# Patient Record
Sex: MALE | Race: WHITE | Employment: OTHER | URBAN - NONMETROPOLITAN AREA
[De-identification: names, ages, dates, MRNs, and addresses within clinical notes are randomized per-mention and may not be internally consistent; named-entity substitution may affect disease eponyms.]

---

## 2017-01-09 RX ORDER — ROSUVASTATIN CALCIUM 20 MG/1
TABLET, COATED ORAL
Qty: 15 TABLET | Refills: 11 | Status: SHIPPED | OUTPATIENT
Start: 2017-01-09 | End: 2017-02-23 | Stop reason: SDUPTHER

## 2017-01-19 ENCOUNTER — OFFICE VISIT (OUTPATIENT)
Dept: INTERNAL MEDICINE | Age: 67
End: 2017-01-19

## 2017-01-19 VITALS
WEIGHT: 248 LBS | DIASTOLIC BLOOD PRESSURE: 80 MMHG | SYSTOLIC BLOOD PRESSURE: 150 MMHG | HEIGHT: 72 IN | RESPIRATION RATE: 16 BRPM | BODY MASS INDEX: 33.59 KG/M2 | HEART RATE: 84 BPM

## 2017-01-19 DIAGNOSIS — E11.8 TYPE 2 DIABETES MELLITUS WITH COMPLICATION, WITHOUT LONG-TERM CURRENT USE OF INSULIN (HCC): ICD-10-CM

## 2017-01-19 DIAGNOSIS — E11.9 TYPE 2 DIABETES MELLITUS WITHOUT COMPLICATION, WITHOUT LONG-TERM CURRENT USE OF INSULIN (HCC): Primary | ICD-10-CM

## 2017-01-19 DIAGNOSIS — I10 ESSENTIAL HYPERTENSION: ICD-10-CM

## 2017-01-19 DIAGNOSIS — E78.5 HYPERLIPIDEMIA, UNSPECIFIED HYPERLIPIDEMIA TYPE: ICD-10-CM

## 2017-01-19 DIAGNOSIS — Z12.5 SPECIAL SCREENING FOR MALIGNANT NEOPLASM OF PROSTATE: ICD-10-CM

## 2017-01-19 PROCEDURE — G8419 CALC BMI OUT NRM PARAM NOF/U: HCPCS | Performed by: INTERNAL MEDICINE

## 2017-01-19 PROCEDURE — 99214 OFFICE O/P EST MOD 30 MIN: CPT | Performed by: INTERNAL MEDICINE

## 2017-01-19 PROCEDURE — 3017F COLORECTAL CA SCREEN DOC REV: CPT | Performed by: INTERNAL MEDICINE

## 2017-01-19 PROCEDURE — 1036F TOBACCO NON-USER: CPT | Performed by: INTERNAL MEDICINE

## 2017-01-19 PROCEDURE — 1123F ACP DISCUSS/DSCN MKR DOCD: CPT | Performed by: INTERNAL MEDICINE

## 2017-01-19 PROCEDURE — 3045F PR MOST RECENT HEMOGLOBIN A1C LEVEL 7.0-9.0%: CPT | Performed by: INTERNAL MEDICINE

## 2017-01-19 PROCEDURE — G8484 FLU IMMUNIZE NO ADMIN: HCPCS | Performed by: INTERNAL MEDICINE

## 2017-01-19 PROCEDURE — 4040F PNEUMOC VAC/ADMIN/RCVD: CPT | Performed by: INTERNAL MEDICINE

## 2017-01-19 PROCEDURE — G8427 DOCREV CUR MEDS BY ELIG CLIN: HCPCS | Performed by: INTERNAL MEDICINE

## 2017-01-19 RX ORDER — NATEGLINIDE 120 MG/1
120 TABLET ORAL
Qty: 270 TABLET | Refills: 3 | Status: SHIPPED | OUTPATIENT
Start: 2017-01-19 | End: 2017-01-23 | Stop reason: SDUPTHER

## 2017-01-19 ASSESSMENT — ENCOUNTER SYMPTOMS
SHORTNESS OF BREATH: 0
ABDOMINAL PAIN: 0
VOMITING: 0
BACK PAIN: 0
DIARRHEA: 0
COUGH: 0
EYE PAIN: 0
CONSTIPATION: 0
NAUSEA: 0
BLOOD IN STOOL: 0

## 2017-01-23 DIAGNOSIS — E11.8 TYPE 2 DIABETES MELLITUS WITH COMPLICATION, WITHOUT LONG-TERM CURRENT USE OF INSULIN (HCC): ICD-10-CM

## 2017-01-23 RX ORDER — NATEGLINIDE 120 MG/1
120 TABLET ORAL
Qty: 270 TABLET | Refills: 3 | Status: SHIPPED | OUTPATIENT
Start: 2017-01-23 | End: 2017-02-23 | Stop reason: SDUPTHER

## 2017-02-23 DIAGNOSIS — E11.8 TYPE 2 DIABETES MELLITUS WITH COMPLICATION, UNSPECIFIED LONG TERM INSULIN USE STATUS: ICD-10-CM

## 2017-02-23 DIAGNOSIS — E11.8 TYPE 2 DIABETES MELLITUS WITH COMPLICATION, WITHOUT LONG-TERM CURRENT USE OF INSULIN (HCC): ICD-10-CM

## 2017-02-23 RX ORDER — NATEGLINIDE 120 MG/1
120 TABLET ORAL
Qty: 270 TABLET | Refills: 3 | Status: SHIPPED | OUTPATIENT
Start: 2017-02-23 | End: 2017-11-28 | Stop reason: SDUPTHER

## 2017-02-23 RX ORDER — LISINOPRIL 10 MG/1
10 TABLET ORAL DAILY
Qty: 90 TABLET | Refills: 3 | Status: SHIPPED | OUTPATIENT
Start: 2017-02-23 | End: 2017-11-28 | Stop reason: SDUPTHER

## 2017-02-23 RX ORDER — GLIMEPIRIDE 4 MG/1
TABLET ORAL
Qty: 180 TABLET | Refills: 3 | Status: SHIPPED | OUTPATIENT
Start: 2017-02-23 | End: 2017-11-28 | Stop reason: SDUPTHER

## 2017-02-23 RX ORDER — METFORMIN HYDROCHLORIDE 500 MG/1
1000 TABLET, EXTENDED RELEASE ORAL 2 TIMES DAILY
Qty: 360 TABLET | Refills: 3 | Status: SHIPPED | OUTPATIENT
Start: 2017-02-23 | End: 2017-11-28 | Stop reason: SDUPTHER

## 2017-02-23 RX ORDER — ROSUVASTATIN CALCIUM 20 MG/1
TABLET, COATED ORAL
Qty: 45 TABLET | Refills: 3 | Status: SHIPPED | OUTPATIENT
Start: 2017-02-23 | End: 2017-11-28 | Stop reason: SDUPTHER

## 2017-07-18 ENCOUNTER — OFFICE VISIT (OUTPATIENT)
Dept: INTERNAL MEDICINE | Age: 67
End: 2017-07-18
Payer: MEDICARE

## 2017-07-18 VITALS
SYSTOLIC BLOOD PRESSURE: 136 MMHG | WEIGHT: 251 LBS | HEIGHT: 72 IN | BODY MASS INDEX: 34 KG/M2 | DIASTOLIC BLOOD PRESSURE: 80 MMHG | HEART RATE: 76 BPM | RESPIRATION RATE: 20 BRPM

## 2017-07-18 DIAGNOSIS — E11.9 TYPE 2 DIABETES MELLITUS WITHOUT COMPLICATION, WITHOUT LONG-TERM CURRENT USE OF INSULIN (HCC): Primary | ICD-10-CM

## 2017-07-18 DIAGNOSIS — E78.5 HYPERLIPIDEMIA, UNSPECIFIED HYPERLIPIDEMIA TYPE: ICD-10-CM

## 2017-07-18 DIAGNOSIS — I10 ESSENTIAL HYPERTENSION: ICD-10-CM

## 2017-07-18 PROCEDURE — 99214 OFFICE O/P EST MOD 30 MIN: CPT | Performed by: INTERNAL MEDICINE

## 2017-07-18 PROCEDURE — 3046F HEMOGLOBIN A1C LEVEL >9.0%: CPT | Performed by: INTERNAL MEDICINE

## 2017-07-18 PROCEDURE — 1036F TOBACCO NON-USER: CPT | Performed by: INTERNAL MEDICINE

## 2017-07-18 PROCEDURE — G8419 CALC BMI OUT NRM PARAM NOF/U: HCPCS | Performed by: INTERNAL MEDICINE

## 2017-07-18 PROCEDURE — 1123F ACP DISCUSS/DSCN MKR DOCD: CPT | Performed by: INTERNAL MEDICINE

## 2017-07-18 PROCEDURE — G8427 DOCREV CUR MEDS BY ELIG CLIN: HCPCS | Performed by: INTERNAL MEDICINE

## 2017-07-18 PROCEDURE — 4040F PNEUMOC VAC/ADMIN/RCVD: CPT | Performed by: INTERNAL MEDICINE

## 2017-07-18 PROCEDURE — 3017F COLORECTAL CA SCREEN DOC REV: CPT | Performed by: INTERNAL MEDICINE

## 2017-07-18 ASSESSMENT — ENCOUNTER SYMPTOMS
ABDOMINAL PAIN: 0
NAUSEA: 0
EYE PAIN: 0
DIARRHEA: 0
VOMITING: 0
BLOOD IN STOOL: 0
BACK PAIN: 0
CONSTIPATION: 0
COUGH: 0
SHORTNESS OF BREATH: 0

## 2017-07-18 ASSESSMENT — PATIENT HEALTH QUESTIONNAIRE - PHQ9
SUM OF ALL RESPONSES TO PHQ9 QUESTIONS 1 & 2: 0
1. LITTLE INTEREST OR PLEASURE IN DOING THINGS: 0
SUM OF ALL RESPONSES TO PHQ QUESTIONS 1-9: 0
2. FEELING DOWN, DEPRESSED OR HOPELESS: 0

## 2017-11-28 DIAGNOSIS — E11.8 TYPE 2 DIABETES MELLITUS WITH COMPLICATION, UNSPECIFIED LONG TERM INSULIN USE STATUS: ICD-10-CM

## 2017-11-28 DIAGNOSIS — E11.8 TYPE 2 DIABETES MELLITUS WITH COMPLICATION, WITHOUT LONG-TERM CURRENT USE OF INSULIN (HCC): ICD-10-CM

## 2017-11-28 RX ORDER — NATEGLINIDE 120 MG/1
TABLET ORAL
Qty: 270 TABLET | Refills: 3 | Status: SHIPPED | OUTPATIENT
Start: 2017-11-28 | End: 2018-11-25 | Stop reason: SDUPTHER

## 2017-11-28 RX ORDER — METFORMIN HYDROCHLORIDE 500 MG/1
TABLET, EXTENDED RELEASE ORAL
Qty: 360 TABLET | Refills: 3 | Status: SHIPPED | OUTPATIENT
Start: 2017-11-28 | End: 2018-11-25 | Stop reason: SDUPTHER

## 2017-11-28 RX ORDER — ROSUVASTATIN CALCIUM 20 MG/1
TABLET, COATED ORAL
Qty: 45 TABLET | Refills: 3 | Status: SHIPPED | OUTPATIENT
Start: 2017-11-28 | End: 2018-11-25 | Stop reason: SDUPTHER

## 2017-11-28 RX ORDER — LISINOPRIL 10 MG/1
10 TABLET ORAL DAILY
Qty: 90 TABLET | Refills: 3 | Status: SHIPPED | OUTPATIENT
Start: 2017-11-28 | End: 2018-11-25 | Stop reason: SDUPTHER

## 2017-11-28 RX ORDER — GLIMEPIRIDE 4 MG/1
TABLET ORAL
Qty: 180 TABLET | Refills: 3 | Status: SHIPPED | OUTPATIENT
Start: 2017-11-28 | End: 2018-11-25 | Stop reason: SDUPTHER

## 2018-01-12 ENCOUNTER — HOSPITAL ENCOUNTER (OUTPATIENT)
Dept: LAB | Age: 68
Setting detail: SPECIMEN
Discharge: HOME | End: 2018-01-12
Payer: MEDICARE

## 2018-01-12 DIAGNOSIS — E11.9 TYPE 2 DIABETES MELLITUS WITHOUT COMPLICATION, WITHOUT LONG-TERM CURRENT USE OF INSULIN (HCC): ICD-10-CM

## 2018-01-12 DIAGNOSIS — E78.5 HYPERLIPIDEMIA, UNSPECIFIED HYPERLIPIDEMIA TYPE: ICD-10-CM

## 2018-01-12 DIAGNOSIS — I10 ESSENTIAL HYPERTENSION: ICD-10-CM

## 2018-01-12 LAB
ESTIMATED AVERAGE GLUCOSE: 192 MG/DL
HBA1C MFR BLD: 8.3 % (ref 4.8–5.9)

## 2018-01-12 PROCEDURE — 36415 COLL VENOUS BLD VENIPUNCTURE: CPT

## 2018-01-12 PROCEDURE — 83036 HEMOGLOBIN GLYCOSYLATED A1C: CPT

## 2018-01-19 ENCOUNTER — OFFICE VISIT (OUTPATIENT)
Dept: INTERNAL MEDICINE | Age: 68
End: 2018-01-19
Payer: MEDICARE

## 2018-01-19 VITALS
WEIGHT: 258 LBS | HEART RATE: 76 BPM | SYSTOLIC BLOOD PRESSURE: 138 MMHG | BODY MASS INDEX: 34.95 KG/M2 | RESPIRATION RATE: 20 BRPM | DIASTOLIC BLOOD PRESSURE: 86 MMHG | HEIGHT: 72 IN

## 2018-01-19 DIAGNOSIS — E78.5 HYPERLIPIDEMIA, UNSPECIFIED HYPERLIPIDEMIA TYPE: ICD-10-CM

## 2018-01-19 DIAGNOSIS — Z12.5 SPECIAL SCREENING FOR MALIGNANT NEOPLASM OF PROSTATE: ICD-10-CM

## 2018-01-19 DIAGNOSIS — Z00.00 ROUTINE GENERAL MEDICAL EXAMINATION AT A HEALTH CARE FACILITY: ICD-10-CM

## 2018-01-19 DIAGNOSIS — Z00.00 GENERAL MEDICAL EXAM: Primary | ICD-10-CM

## 2018-01-19 DIAGNOSIS — I10 ESSENTIAL HYPERTENSION: ICD-10-CM

## 2018-01-19 DIAGNOSIS — Z00.00 MEDICARE ANNUAL WELLNESS VISIT, SUBSEQUENT: ICD-10-CM

## 2018-01-19 DIAGNOSIS — E11.9 TYPE 2 DIABETES MELLITUS WITHOUT COMPLICATION, WITHOUT LONG-TERM CURRENT USE OF INSULIN (HCC): ICD-10-CM

## 2018-01-19 PROCEDURE — G0439 PPPS, SUBSEQ VISIT: HCPCS | Performed by: INTERNAL MEDICINE

## 2018-01-19 PROCEDURE — 3045F PR MOST RECENT HEMOGLOBIN A1C LEVEL 7.0-9.0%: CPT | Performed by: INTERNAL MEDICINE

## 2018-01-19 ASSESSMENT — LIFESTYLE VARIABLES
HAVE YOU OR SOMEONE ELSE BEEN INJURED AS A RESULT OF YOUR DRINKING: 0
HOW OFTEN DURING THE LAST YEAR HAVE YOU FOUND THAT YOU WERE NOT ABLE TO STOP DRINKING ONCE YOU HAD STARTED: 0
HOW OFTEN DURING THE LAST YEAR HAVE YOU NEEDED AN ALCOHOLIC DRINK FIRST THING IN THE MORNING TO GET YOURSELF GOING AFTER A NIGHT OF HEAVY DRINKING: 0
HOW OFTEN DURING THE LAST YEAR HAVE YOU HAD A FEELING OF GUILT OR REMORSE AFTER DRINKING: 0
HOW OFTEN DO YOU HAVE SIX OR MORE DRINKS ON ONE OCCASION: 2
HOW MANY STANDARD DRINKS CONTAINING ALCOHOL DO YOU HAVE ON A TYPICAL DAY: 1
HOW OFTEN DURING THE LAST YEAR HAVE YOU FAILED TO DO WHAT WAS NORMALLY EXPECTED FROM YOU BECAUSE OF DRINKING: 0
HAS A RELATIVE, FRIEND, DOCTOR, OR ANOTHER HEALTH PROFESSIONAL EXPRESSED CONCERN ABOUT YOUR DRINKING OR SUGGESTED YOU CUT DOWN: 0
HOW OFTEN DURING THE LAST YEAR HAVE YOU BEEN UNABLE TO REMEMBER WHAT HAPPENED THE NIGHT BEFORE BECAUSE YOU HAD BEEN DRINKING: 0

## 2018-01-19 ASSESSMENT — ENCOUNTER SYMPTOMS
DIARRHEA: 0
BLOOD IN STOOL: 0
CONSTIPATION: 0
EYE PAIN: 0
ABDOMINAL PAIN: 0
BACK PAIN: 0
COUGH: 0
VOMITING: 0
SHORTNESS OF BREATH: 0
NAUSEA: 0

## 2018-01-19 ASSESSMENT — ANXIETY QUESTIONNAIRES: GAD7 TOTAL SCORE: 0

## 2018-01-19 ASSESSMENT — PATIENT HEALTH QUESTIONNAIRE - PHQ9: SUM OF ALL RESPONSES TO PHQ QUESTIONS 1-9: 0

## 2018-01-19 NOTE — PROGRESS NOTES
Value   07/17/2017 25     BUN (mg/dL)   Date Value   07/17/2017 15     BP Readings from Last 3 Encounters:   01/19/18 138/86   07/18/17 136/80   01/19/17 (!) 150/80              Past Medical History:   Diagnosis Date    Anemia     minimal anemia, hemoglobin 13.6    Benign prostatic hypertrophy     Cyst in hand     distal phalanx, mid finger left hand    Hyperlipidemia     Hypertension     Keratosis     right forehead    Overweight(278.02)     Seborrhea     Type II or unspecified type diabetes mellitus without mention of complication, not stated as uncontrolled       History reviewed. No pertinent surgical history. Family History   Problem Relation Age of Onset    Diabetes Mother     Cancer Father      throat cancer    Stroke Father     Cancer Brother 64     pancreatic    Cancer Paternal Uncle      lung       Social History   Substance Use Topics    Smoking status: Former Smoker     Types: Cigarettes     Quit date: 12/9/2000    Smokeless tobacco: Never Used    Alcohol use Yes      Comment: no alcohol ingestion after a heavier intake eearlier in his life      Current Outpatient Prescriptions   Medication Sig Dispense Refill    lisinopril (PRINIVIL;ZESTRIL) 10 MG tablet TAKE 1 TABLET BY MOUTH  DAILY 90 tablet 3    nateglinide (STARLIX) 120 MG tablet TAKE 1 TABLET BY MOUTH 3  TIMES DAILY BEFORE MEALS 270 tablet 3    metFORMIN (GLUCOPHAGE-XR) 500 MG extended release tablet TAKE 2 TABLETS BY MOUTH TWO TIMES DAILY 360 tablet 3    glimepiride (AMARYL) 4 MG tablet TAKE 1 TABLET BY MOUTH  TWICE A  tablet 3    rosuvastatin (CRESTOR) 20 MG tablet TAKE ONE-HALF TABLET BY  MOUTH ONCE DAILY 45 tablet 3    Blood Glucose Monitoring Suppl (ONE TOUCH ULTRA 2) by Does not apply route. Tests blood sugar once a day       No current facility-administered medications for this visit.       No Known Allergies    Health Maintenance   Topic Date Due    AAA screen  1950    Diabetic foot exam  01/19/2018

## 2018-01-19 NOTE — PROGRESS NOTES
Medicare Annual Wellness Visit  Name: Darrin Mckenzie Date: 2018   MRN: [id number] Sex: Male   Age: 78 y.o. Ethnicity: Non-/Non    : 1950 Race: Nurys Paula is here for Medicare AWV; Diabetes; Hypertension; and Hyperlipidemia    Screenings for behavioral, psychosocial and functional/safety risks, and cognitive dysfunction are all negative except as indicated below. These results, as well as other patient data from the [address] form, are documented in Flowsheets linked to this Encounter. No Known Allergies    Prior to Visit Medications    Medication Sig Taking? Authorizing Provider   lisinopril (PRINIVIL;ZESTRIL) 10 MG tablet TAKE 1 TABLET BY MOUTH  DAILY Yes Rudy Werner MD   nateglinide (STARLIX) 120 MG tablet TAKE 1 TABLET BY MOUTH 3  TIMES DAILY BEFORE MEALS Yes Rudy Werner MD   metFORMIN (GLUCOPHAGE-XR) 500 MG extended release tablet TAKE 2 TABLETS BY MOUTH TWO TIMES DAILY Yes Rudy Werner MD   glimepiride (AMARYL) 4 MG tablet TAKE 1 TABLET BY MOUTH  TWICE A DAY Yes Rudy Werner MD   rosuvastatin (CRESTOR) 20 MG tablet TAKE ONE-HALF TABLET BY  MOUTH ONCE DAILY Yes Rudy Werner MD   Blood Glucose Monitoring Suppl (ONE TOUCH ULTRA 2) by Does not apply route. Tests blood sugar once a day Yes Historical Provider, MD       Past Medical History:   Diagnosis Date    Anemia     minimal anemia, hemoglobin 13.6    Benign prostatic hypertrophy     Cyst in hand     distal phalanx, mid finger left hand    Hyperlipidemia     Hypertension     Keratosis     right forehead    Overweight(278.02)     Seborrhea     Type II or unspecified type diabetes mellitus without mention of complication, not stated as uncontrolled      History reviewed. No pertinent surgical history.     Family History   Problem Relation Age of Onset    Diabetes Mother     Cancer Father      throat cancer    Stroke Father     Cancer Brother 64 pancreatic    Cancer Paternal Uncle      lung       CareTeam (Including outside providers/suppliers regularly involved in providing care):   Patient Care Team:  Emily Armstrong MD as PCP - General (Internal Medicine)  Emily Armstrong MD as PCP - S Attributed Provider    Wt Readings from Last 3 Encounters:   01/19/18 258 lb (117 kg)   07/18/17 251 lb (113.9 kg)   01/19/17 248 lb (112.5 kg)     Vitals:    01/19/18 0817   BP: 138/86   Site: Left Arm   Position: Sitting   Cuff Size: Large Adult   Pulse: 76   Resp: 20   Weight: 258 lb (117 kg)   Height: 6' (1.829 m)           Patient's complete Health Risk Assessment and screening values have been reviewed and are found in Flowsheets. The following problems were reviewed today and where indicated follow up appointments were made and/or referrals ordered. Positive Risk Factor Screenings with Interventions:     Health Habits/Nutrition:  Health Habits/Nutrition  Do you exercise for at least 20 minutes 2-3 times per week?: Yes  Have you lost any weight without trying in the past 3 months?: No  Do you eat fewer than 2 meals per day?: No  Have you seen a dentist within the past year?: Yes  Body mass index is 34.99 kg/m².   Health Habits/Nutrition Interventions:  · None indicated      Personalized Preventive Plan   Current Health Maintenance Status  Immunization History   Administered Date(s) Administered    Hepatitis B, unspecified formulation 12/17/1999    Influenza Virus Vaccine 11/26/2007, 11/24/2008, 12/17/2012    Td 12/17/1999        Health Maintenance   Topic Date Due    AAA screen  1950    Diabetic foot exam  01/19/2018    Flu vaccine (1) 01/19/2018 (Originally 9/1/2017)    Pneumococcal low/med risk (1 of 2 - PCV13) 01/19/2018 (Originally 6/23/2016)    Colon cancer screen colonoscopy  01/19/2018 (Originally 6/23/2001)    DTaP/Tdap/Td vaccine (1 - Tdap) 07/18/2018 (Originally 12/18/1999)    Hepatitis C screen  07/18/2018 (Originally 1950)  Diabetic retinal exam  02/08/2018    Diabetic microalbuminuria test  07/17/2018    Lipid screen  07/17/2018    Potassium monitoring  07/17/2018    Creatinine monitoring  07/17/2018    A1C test (Diabetic or Prediabetic)  01/12/2019    Zostavax vaccine  Addressed     Recommendations for Preventive Services Due: see orders.   Recommended screening schedule for the next 5-10 years is provided to the patient in written form: see Patient Instructions/AVS.

## 2018-02-13 LAB — DIABETIC RETINOPATHY: NORMAL

## 2018-07-13 ENCOUNTER — HOSPITAL ENCOUNTER (OUTPATIENT)
Dept: LAB | Age: 68
Setting detail: SPECIMEN
Discharge: HOME | End: 2018-07-13
Payer: MEDICARE

## 2018-07-13 DIAGNOSIS — Z12.5 SPECIAL SCREENING FOR MALIGNANT NEOPLASM OF PROSTATE: ICD-10-CM

## 2018-07-13 DIAGNOSIS — I10 ESSENTIAL HYPERTENSION: ICD-10-CM

## 2018-07-13 DIAGNOSIS — E11.9 TYPE 2 DIABETES MELLITUS WITHOUT COMPLICATION, WITHOUT LONG-TERM CURRENT USE OF INSULIN (HCC): ICD-10-CM

## 2018-07-13 DIAGNOSIS — E78.5 HYPERLIPIDEMIA, UNSPECIFIED HYPERLIPIDEMIA TYPE: ICD-10-CM

## 2018-07-13 LAB
ALBUMIN SERPL-MCNC: 4.2 G/DL (ref 3.5–5.2)
ALBUMIN/GLOBULIN RATIO: 1.8 (ref 1–2.5)
ALP BLD-CCNC: 53 U/L (ref 40–129)
ALT SERPL-CCNC: 28 U/L (ref 5–41)
ANION GAP SERPL CALCULATED.3IONS-SCNC: 11 MMOL/L (ref 9–17)
AST SERPL-CCNC: 20 U/L
BILIRUB SERPL-MCNC: 0.44 MG/DL (ref 0.3–1.2)
BUN BLDV-MCNC: 11 MG/DL (ref 8–23)
BUN/CREAT BLD: 12 (ref 9–20)
CALCIUM SERPL-MCNC: 8.9 MG/DL (ref 8.6–10.4)
CHLORIDE BLD-SCNC: 100 MMOL/L (ref 98–107)
CHOLESTEROL/HDL RATIO: 4.4
CHOLESTEROL: 122 MG/DL
CO2: 29 MMOL/L (ref 20–31)
CREAT SERPL-MCNC: 0.92 MG/DL (ref 0.7–1.2)
CREATININE URINE: 100.5 MG/DL (ref 39–259)
ESTIMATED AVERAGE GLUCOSE: 177 MG/DL
GFR AFRICAN AMERICAN: >60 ML/MIN
GFR NON-AFRICAN AMERICAN: >60 ML/MIN
GFR SERPL CREATININE-BSD FRML MDRD: ABNORMAL ML/MIN/{1.73_M2}
GFR SERPL CREATININE-BSD FRML MDRD: ABNORMAL ML/MIN/{1.73_M2}
GLUCOSE BLD-MCNC: 181 MG/DL (ref 70–99)
HBA1C MFR BLD: 7.8 % (ref 4.8–5.9)
HDLC SERPL-MCNC: 28 MG/DL
LDL CHOLESTEROL: 38 MG/DL (ref 0–130)
MICROALBUMIN/CREAT 24H UR: <12 MG/L
MICROALBUMIN/CREAT UR-RTO: NORMAL MCG/MG CREAT
POTASSIUM SERPL-SCNC: 4.9 MMOL/L (ref 3.7–5.3)
PROSTATE SPECIFIC ANTIGEN: 0.82 UG/L
SODIUM BLD-SCNC: 140 MMOL/L (ref 135–144)
TOTAL PROTEIN: 6.6 G/DL (ref 6.4–8.3)
TRIGL SERPL-MCNC: 282 MG/DL
VLDLC SERPL CALC-MCNC: ABNORMAL MG/DL (ref 1–30)

## 2018-07-13 PROCEDURE — 82570 ASSAY OF URINE CREATININE: CPT

## 2018-07-13 PROCEDURE — 36415 COLL VENOUS BLD VENIPUNCTURE: CPT

## 2018-07-13 PROCEDURE — 80061 LIPID PANEL: CPT

## 2018-07-13 PROCEDURE — 82043 UR ALBUMIN QUANTITATIVE: CPT

## 2018-07-13 PROCEDURE — G0103 PSA SCREENING: HCPCS

## 2018-07-13 PROCEDURE — 80053 COMPREHEN METABOLIC PANEL: CPT

## 2018-07-13 PROCEDURE — 83036 HEMOGLOBIN GLYCOSYLATED A1C: CPT

## 2018-07-20 ENCOUNTER — OFFICE VISIT (OUTPATIENT)
Dept: INTERNAL MEDICINE | Age: 68
End: 2018-07-20
Payer: MEDICARE

## 2018-07-20 VITALS
DIASTOLIC BLOOD PRESSURE: 84 MMHG | HEART RATE: 72 BPM | RESPIRATION RATE: 20 BRPM | HEIGHT: 72 IN | BODY MASS INDEX: 34.27 KG/M2 | SYSTOLIC BLOOD PRESSURE: 138 MMHG | WEIGHT: 253 LBS

## 2018-07-20 DIAGNOSIS — I10 ESSENTIAL HYPERTENSION: ICD-10-CM

## 2018-07-20 DIAGNOSIS — E11.9 TYPE 2 DIABETES MELLITUS WITHOUT COMPLICATION, WITHOUT LONG-TERM CURRENT USE OF INSULIN (HCC): Primary | ICD-10-CM

## 2018-07-20 DIAGNOSIS — E78.5 HYPERLIPIDEMIA, UNSPECIFIED HYPERLIPIDEMIA TYPE: ICD-10-CM

## 2018-07-20 PROCEDURE — 3045F PR MOST RECENT HEMOGLOBIN A1C LEVEL 7.0-9.0%: CPT | Performed by: INTERNAL MEDICINE

## 2018-07-20 PROCEDURE — G8417 CALC BMI ABV UP PARAM F/U: HCPCS | Performed by: INTERNAL MEDICINE

## 2018-07-20 PROCEDURE — G8427 DOCREV CUR MEDS BY ELIG CLIN: HCPCS | Performed by: INTERNAL MEDICINE

## 2018-07-20 PROCEDURE — 4040F PNEUMOC VAC/ADMIN/RCVD: CPT | Performed by: INTERNAL MEDICINE

## 2018-07-20 PROCEDURE — 1123F ACP DISCUSS/DSCN MKR DOCD: CPT | Performed by: INTERNAL MEDICINE

## 2018-07-20 PROCEDURE — 1101F PT FALLS ASSESS-DOCD LE1/YR: CPT | Performed by: INTERNAL MEDICINE

## 2018-07-20 PROCEDURE — 1036F TOBACCO NON-USER: CPT | Performed by: INTERNAL MEDICINE

## 2018-07-20 PROCEDURE — 2022F DILAT RTA XM EVC RTNOPTHY: CPT | Performed by: INTERNAL MEDICINE

## 2018-07-20 PROCEDURE — 99214 OFFICE O/P EST MOD 30 MIN: CPT | Performed by: INTERNAL MEDICINE

## 2018-07-20 PROCEDURE — 3017F COLORECTAL CA SCREEN DOC REV: CPT | Performed by: INTERNAL MEDICINE

## 2018-07-20 ASSESSMENT — ENCOUNTER SYMPTOMS
SHORTNESS OF BREATH: 0
VOMITING: 0
EYE PAIN: 0
BACK PAIN: 0
DIARRHEA: 0
BLOOD IN STOOL: 0
COUGH: 0
CONSTIPATION: 0
NAUSEA: 0
ABDOMINAL PAIN: 0

## 2018-07-20 NOTE — PATIENT INSTRUCTIONS
Patient Education          Walking for Exercise: Care Instructions  Your Care Instructions    Walking is one of the easiest ways to get the exercise you need for good health. A brisk, 30-minute walk each day can help you feel better and have more energy. It can help you lower your risk of disease. Walking can help you keep your bones strong and your heart healthy. Check with your doctor before you start a walking plan if you have heart problems, other health issues, or you have not been active in a long time. Follow your doctor's instructions for safe levels of exercise. Follow-up care is a key part of your treatment and safety. Be sure to make and go to all appointments, and call your doctor if you are having problems. It's also a good idea to know your test results and keep a list of the medicines you take. How can you care for yourself at home? Getting started  · Start slowly and set a short-term goal. For example, walk for 5 or 10 minutes every day. · Bit by bit, increase the amount you walk every day. Try for at least 30 minutes on most days of the week. You also may want to swim, bike, or do other activities. · If finding enough time is a problem, it is fine to be active in blocks of 10 minutes or more throughout your day and week. · To get the heart-healthy benefits of walking, you need to walk briskly enough to increase your heart rate and breathing, but not so fast that you cannot talk comfortably. · Wear comfortable shoes that fit well and provide good support for your feet and ankles. Staying with your plan  · After you've made walking a habit, set a longer-term goal. You may want to set a goal of walking briskly for longer or walking farther. Experts say to do 2½ hours of moderate activity a week. A faster heartbeat is what defines moderate-level activity. · To stay motivated, walk with friends, coworkers, or pets. · Use a phone tray or pedometer to track your steps each day.  Set a goal to

## 2018-07-20 NOTE — PROGRESS NOTES
[address] [facility]  [place] [address] [address]  Dept: [phone]  Dept Fax: [phone]  Loc: [phone]    Nathalia Camargo is a 80 y.o. male who presents today for his medical conditions/complaints as noted below. Nathalia Camargo is c/o of   Chief Complaint   Patient presents with    Diabetes     6 month appt    Hypertension    Hyperlipidemia         HPI:     Diabetes   He presents for his follow-up diabetic visit. He has type 2 (Without complication and without insulin) diabetes mellitus. His disease course has been fluctuating. Pertinent negatives for hypoglycemia include no confusion, dizziness, headaches, nervousness/anxiousness or pallor. Pertinent negatives for diabetes include no chest pain, no polydipsia, no polyuria and no weakness. Hypertension   This is a chronic problem. The current episode started more than 1 year ago. The problem has been waxing and waning since onset. The problem is controlled. Pertinent negatives include no chest pain, headaches, neck pain, palpitations or shortness of breath. Hyperlipidemia   This is a chronic problem. The current episode started more than 1 year ago. The problem is controlled. Recent lipid tests were reviewed and are variable. Pertinent negatives include no chest pain or shortness of breath. Hemoglobin A1C (%)   Date Value   07/13/2018 7.8 (H)   01/12/2018 8.3 (H)   07/17/2017 7.1 (H)                Microalb/Crt.  Ratio (mcg/mg creat)   Date Value   07/13/2018 CANNOT BE CALCULATED     LDL Cholesterol (mg/dL)   Date Value   07/13/2018 38   07/17/2017 42   07/08/2016 70         AST (U/L)   Date Value   07/13/2018 20     ALT (U/L)   Date Value   07/13/2018 28     BUN (mg/dL)   Date Value   07/13/2018 11     BP Readings from Last 3 Encounters:   07/20/18 138/84   01/19/18 138/86   07/18/17 136/80              Past Medical History:   Diagnosis Date    Anemia     minimal anemia, hemoglobin 13.6    Benign no cervical adenopathy. Neurological: He is alert and oriented to person, place, and time. No cranial nerve deficit (grossly). Skin: Skin is warm and dry. No rash noted. Psychiatric: He has a normal mood and affect. Thought content normal.   Vitals reviewed. /84 (Site: Left Arm, Position: Sitting, Cuff Size: Large Adult)   Pulse 72   Resp 20   Ht 6' (1.829 m)   Wt 253 lb (114.8 kg)   BMI 34.31 kg/m²     Assessment:       Diagnosis Orders   1. Type 2 diabetes mellitus without complication, without long-term current use of insulin (HCC)  Hemoglobin A1C   2. Essential hypertension  Hemoglobin A1C   3. Hyperlipidemia, unspecified hyperlipidemia type  Hemoglobin A1C             Plan:      Return in about 6 months (around 1/20/2019) for Diabetes, Hypertension, Hyperlipidemia. Orders Placed This Encounter   Procedures    Hemoglobin A1C     Standing Status:   Future     Standing Expiration Date:   7/20/2019     No orders of the defined types were placed in this encounter. Patient given educational materials - see patient instructions. Discussed use, benefit, and side effects of prescribed medications. All patient questions answered. Pt voiced understanding. Reviewed health maintenance. Instructed to continue current medications, diet and exercise. Patient agreed with treatment plan. Follow up as directed.      Electronically signed by Mireille Luther MD on 7/20/2018 at 8:43 AM

## 2018-11-25 DIAGNOSIS — E78.5 HYPERLIPIDEMIA, UNSPECIFIED HYPERLIPIDEMIA TYPE: ICD-10-CM

## 2018-11-25 DIAGNOSIS — I10 ESSENTIAL HYPERTENSION: Primary | ICD-10-CM

## 2018-11-25 DIAGNOSIS — E11.8 TYPE 2 DIABETES MELLITUS WITH COMPLICATION, WITHOUT LONG-TERM CURRENT USE OF INSULIN (HCC): ICD-10-CM

## 2018-11-26 RX ORDER — LISINOPRIL 10 MG/1
10 TABLET ORAL DAILY
Qty: 90 TABLET | Refills: 3 | Status: SHIPPED | OUTPATIENT
Start: 2018-11-26 | End: 2019-01-22 | Stop reason: SDUPTHER

## 2018-11-26 RX ORDER — NATEGLINIDE 120 MG/1
TABLET ORAL
Qty: 270 TABLET | Refills: 3 | Status: SHIPPED | OUTPATIENT
Start: 2018-11-26 | End: 2019-01-22 | Stop reason: SDUPTHER

## 2018-11-26 RX ORDER — ROSUVASTATIN CALCIUM 20 MG/1
TABLET, COATED ORAL
Qty: 45 TABLET | Refills: 3 | Status: SHIPPED | OUTPATIENT
Start: 2018-11-26 | End: 2019-01-22 | Stop reason: SDUPTHER

## 2018-11-26 RX ORDER — GLIMEPIRIDE 4 MG/1
TABLET ORAL
Qty: 180 TABLET | Refills: 3 | Status: SHIPPED | OUTPATIENT
Start: 2018-11-26 | End: 2019-01-22 | Stop reason: SDUPTHER

## 2018-11-26 RX ORDER — METFORMIN HYDROCHLORIDE 500 MG/1
TABLET, EXTENDED RELEASE ORAL
Qty: 360 TABLET | Refills: 3 | Status: SHIPPED | OUTPATIENT
Start: 2018-11-26 | End: 2019-01-22 | Stop reason: SDUPTHER

## 2019-01-15 ENCOUNTER — HOSPITAL ENCOUNTER (OUTPATIENT)
Dept: LAB | Age: 69
Discharge: HOME | End: 2019-01-15
Payer: MEDICARE

## 2019-01-15 DIAGNOSIS — E78.5 HYPERLIPIDEMIA, UNSPECIFIED HYPERLIPIDEMIA TYPE: ICD-10-CM

## 2019-01-15 DIAGNOSIS — E11.9 TYPE 2 DIABETES MELLITUS WITHOUT COMPLICATION, WITHOUT LONG-TERM CURRENT USE OF INSULIN (HCC): ICD-10-CM

## 2019-01-15 DIAGNOSIS — I10 ESSENTIAL HYPERTENSION: ICD-10-CM

## 2019-01-15 LAB
ESTIMATED AVERAGE GLUCOSE: 189 MG/DL
HBA1C MFR BLD: 8.2 % (ref 4.8–5.9)

## 2019-01-15 PROCEDURE — 83036 HEMOGLOBIN GLYCOSYLATED A1C: CPT

## 2019-01-15 PROCEDURE — 36415 COLL VENOUS BLD VENIPUNCTURE: CPT

## 2019-01-22 ENCOUNTER — OFFICE VISIT (OUTPATIENT)
Dept: INTERNAL MEDICINE | Age: 69
End: 2019-01-22
Payer: MEDICARE

## 2019-01-22 VITALS
HEART RATE: 72 BPM | DIASTOLIC BLOOD PRESSURE: 82 MMHG | HEIGHT: 72 IN | BODY MASS INDEX: 34.54 KG/M2 | WEIGHT: 255 LBS | SYSTOLIC BLOOD PRESSURE: 140 MMHG | RESPIRATION RATE: 20 BRPM

## 2019-01-22 DIAGNOSIS — Z00.00 MEDICARE ANNUAL WELLNESS VISIT, SUBSEQUENT: ICD-10-CM

## 2019-01-22 DIAGNOSIS — I10 ESSENTIAL HYPERTENSION: ICD-10-CM

## 2019-01-22 DIAGNOSIS — E11.9 TYPE 2 DIABETES MELLITUS WITHOUT COMPLICATION, WITHOUT LONG-TERM CURRENT USE OF INSULIN (HCC): ICD-10-CM

## 2019-01-22 DIAGNOSIS — Z00.00 ROUTINE GENERAL MEDICAL EXAMINATION AT A HEALTH CARE FACILITY: Primary | ICD-10-CM

## 2019-01-22 DIAGNOSIS — E78.5 HYPERLIPIDEMIA, UNSPECIFIED HYPERLIPIDEMIA TYPE: ICD-10-CM

## 2019-01-22 DIAGNOSIS — Z12.5 SPECIAL SCREENING FOR MALIGNANT NEOPLASM OF PROSTATE: ICD-10-CM

## 2019-01-22 PROCEDURE — 3045F PR MOST RECENT HEMOGLOBIN A1C LEVEL 7.0-9.0%: CPT | Performed by: INTERNAL MEDICINE

## 2019-01-22 PROCEDURE — 3017F COLORECTAL CA SCREEN DOC REV: CPT | Performed by: INTERNAL MEDICINE

## 2019-01-22 PROCEDURE — 99214 OFFICE O/P EST MOD 30 MIN: CPT | Performed by: INTERNAL MEDICINE

## 2019-01-22 PROCEDURE — 1123F ACP DISCUSS/DSCN MKR DOCD: CPT | Performed by: INTERNAL MEDICINE

## 2019-01-22 PROCEDURE — G8427 DOCREV CUR MEDS BY ELIG CLIN: HCPCS | Performed by: INTERNAL MEDICINE

## 2019-01-22 PROCEDURE — G8417 CALC BMI ABV UP PARAM F/U: HCPCS | Performed by: INTERNAL MEDICINE

## 2019-01-22 PROCEDURE — 2022F DILAT RTA XM EVC RTNOPTHY: CPT | Performed by: INTERNAL MEDICINE

## 2019-01-22 PROCEDURE — G0439 PPPS, SUBSEQ VISIT: HCPCS | Performed by: INTERNAL MEDICINE

## 2019-01-22 PROCEDURE — 4040F PNEUMOC VAC/ADMIN/RCVD: CPT | Performed by: INTERNAL MEDICINE

## 2019-01-22 PROCEDURE — G8484 FLU IMMUNIZE NO ADMIN: HCPCS | Performed by: INTERNAL MEDICINE

## 2019-01-22 PROCEDURE — 1101F PT FALLS ASSESS-DOCD LE1/YR: CPT | Performed by: INTERNAL MEDICINE

## 2019-01-22 PROCEDURE — 1036F TOBACCO NON-USER: CPT | Performed by: INTERNAL MEDICINE

## 2019-01-22 RX ORDER — LISINOPRIL 10 MG/1
10 TABLET ORAL DAILY
Qty: 90 TABLET | Refills: 3 | Status: SHIPPED | OUTPATIENT
Start: 2019-01-22 | End: 2020-03-04 | Stop reason: SDUPTHER

## 2019-01-22 RX ORDER — METFORMIN HYDROCHLORIDE 500 MG/1
TABLET, EXTENDED RELEASE ORAL
Qty: 360 TABLET | Refills: 3 | Status: SHIPPED | OUTPATIENT
Start: 2019-01-22 | End: 2020-03-19 | Stop reason: SDUPTHER

## 2019-01-22 RX ORDER — ROSUVASTATIN CALCIUM 20 MG/1
TABLET, COATED ORAL
Qty: 45 TABLET | Refills: 3 | Status: SHIPPED | OUTPATIENT
Start: 2019-01-22 | End: 2020-03-04 | Stop reason: SDUPTHER

## 2019-01-22 RX ORDER — GLIMEPIRIDE 4 MG/1
TABLET ORAL
Qty: 180 TABLET | Refills: 3 | Status: SHIPPED | OUTPATIENT
Start: 2019-01-22 | End: 2019-03-12 | Stop reason: SDUPTHER

## 2019-01-22 RX ORDER — NATEGLINIDE 120 MG/1
TABLET ORAL
Qty: 270 TABLET | Refills: 3 | Status: SHIPPED | OUTPATIENT
Start: 2019-01-22 | End: 2020-01-27 | Stop reason: DRUGHIGH

## 2019-01-22 ASSESSMENT — ENCOUNTER SYMPTOMS
ABDOMINAL PAIN: 0
SHORTNESS OF BREATH: 0
BLOOD IN STOOL: 0
CONSTIPATION: 0
DIARRHEA: 0
EYE PAIN: 0
BACK PAIN: 0
NAUSEA: 0
VOMITING: 0
COUGH: 0

## 2019-01-22 ASSESSMENT — PATIENT HEALTH QUESTIONNAIRE - PHQ9
SUM OF ALL RESPONSES TO PHQ QUESTIONS 1-9: 0
SUM OF ALL RESPONSES TO PHQ QUESTIONS 1-9: 0

## 2019-01-22 ASSESSMENT — LIFESTYLE VARIABLES
HOW OFTEN DURING THE LAST YEAR HAVE YOU FOUND THAT YOU WERE NOT ABLE TO STOP DRINKING ONCE YOU HAD STARTED: 0
AUDIT TOTAL SCORE: 3
HAS A RELATIVE, FRIEND, DOCTOR, OR ANOTHER HEALTH PROFESSIONAL EXPRESSED CONCERN ABOUT YOUR DRINKING OR SUGGESTED YOU CUT DOWN: 0
HOW OFTEN DURING THE LAST YEAR HAVE YOU BEEN UNABLE TO REMEMBER WHAT HAPPENED THE NIGHT BEFORE BECAUSE YOU HAD BEEN DRINKING: 0
AUDIT-C TOTAL SCORE: 3
HOW OFTEN DO YOU HAVE SIX OR MORE DRINKS ON ONE OCCASION: 1
HOW MANY STANDARD DRINKS CONTAINING ALCOHOL DO YOU HAVE ON A TYPICAL DAY: 1
HOW OFTEN DURING THE LAST YEAR HAVE YOU FAILED TO DO WHAT WAS NORMALLY EXPECTED FROM YOU BECAUSE OF DRINKING: 0
HOW OFTEN DURING THE LAST YEAR HAVE YOU NEEDED AN ALCOHOLIC DRINK FIRST THING IN THE MORNING TO GET YOURSELF GOING AFTER A NIGHT OF HEAVY DRINKING: 0
HOW OFTEN DO YOU HAVE A DRINK CONTAINING ALCOHOL: 1
HAVE YOU OR SOMEONE ELSE BEEN INJURED AS A RESULT OF YOUR DRINKING: 0
HOW OFTEN DURING THE LAST YEAR HAVE YOU HAD A FEELING OF GUILT OR REMORSE AFTER DRINKING: 0

## 2019-01-22 ASSESSMENT — ANXIETY QUESTIONNAIRES: GAD7 TOTAL SCORE: 0

## 2019-01-29 ENCOUNTER — OFFICE VISIT (OUTPATIENT)
Dept: INTERNAL MEDICINE | Age: 69
End: 2019-01-29
Payer: MEDICARE

## 2019-01-29 ENCOUNTER — TELEPHONE (OUTPATIENT)
Dept: INTERNAL MEDICINE | Age: 69
End: 2019-01-29

## 2019-01-29 VITALS
HEART RATE: 70 BPM | SYSTOLIC BLOOD PRESSURE: 140 MMHG | DIASTOLIC BLOOD PRESSURE: 82 MMHG | BODY MASS INDEX: 34.72 KG/M2 | WEIGHT: 256 LBS | OXYGEN SATURATION: 98 %

## 2019-01-29 DIAGNOSIS — E66.09 CLASS 1 OBESITY DUE TO EXCESS CALORIES WITH SERIOUS COMORBIDITY AND BODY MASS INDEX (BMI) OF 34.0 TO 34.9 IN ADULT: ICD-10-CM

## 2019-01-29 DIAGNOSIS — E78.5 HYPERLIPIDEMIA, UNSPECIFIED HYPERLIPIDEMIA TYPE: ICD-10-CM

## 2019-01-29 DIAGNOSIS — Z71.89 DIABETES EDUCATION, ENCOUNTER FOR: ICD-10-CM

## 2019-01-29 DIAGNOSIS — I10 ESSENTIAL HYPERTENSION: ICD-10-CM

## 2019-01-29 DIAGNOSIS — E11.9 TYPE 2 DIABETES MELLITUS WITHOUT COMPLICATION, WITHOUT LONG-TERM CURRENT USE OF INSULIN (HCC): Primary | ICD-10-CM

## 2019-01-29 PROCEDURE — 1123F ACP DISCUSS/DSCN MKR DOCD: CPT | Performed by: NURSE PRACTITIONER

## 2019-01-29 PROCEDURE — 4040F PNEUMOC VAC/ADMIN/RCVD: CPT | Performed by: NURSE PRACTITIONER

## 2019-01-29 PROCEDURE — 1101F PT FALLS ASSESS-DOCD LE1/YR: CPT | Performed by: NURSE PRACTITIONER

## 2019-01-29 PROCEDURE — G8417 CALC BMI ABV UP PARAM F/U: HCPCS | Performed by: NURSE PRACTITIONER

## 2019-01-29 PROCEDURE — 2022F DILAT RTA XM EVC RTNOPTHY: CPT | Performed by: NURSE PRACTITIONER

## 2019-01-29 PROCEDURE — G8484 FLU IMMUNIZE NO ADMIN: HCPCS | Performed by: NURSE PRACTITIONER

## 2019-01-29 PROCEDURE — 99215 OFFICE O/P EST HI 40 MIN: CPT | Performed by: NURSE PRACTITIONER

## 2019-01-29 PROCEDURE — 3017F COLORECTAL CA SCREEN DOC REV: CPT | Performed by: NURSE PRACTITIONER

## 2019-01-29 PROCEDURE — 3045F PR MOST RECENT HEMOGLOBIN A1C LEVEL 7.0-9.0%: CPT | Performed by: NURSE PRACTITIONER

## 2019-01-29 PROCEDURE — 1036F TOBACCO NON-USER: CPT | Performed by: NURSE PRACTITIONER

## 2019-01-29 PROCEDURE — G8427 DOCREV CUR MEDS BY ELIG CLIN: HCPCS | Performed by: NURSE PRACTITIONER

## 2019-01-29 ASSESSMENT — ENCOUNTER SYMPTOMS
ABDOMINAL PAIN: 0
SHORTNESS OF BREATH: 0
DIARRHEA: 0
BLURRED VISION: 1
RESPIRATORY NEGATIVE: 1
VISUAL CHANGE: 0

## 2019-02-12 ENCOUNTER — OFFICE VISIT (OUTPATIENT)
Dept: INTERNAL MEDICINE | Age: 69
End: 2019-02-12
Payer: MEDICARE

## 2019-02-12 VITALS
RESPIRATION RATE: 12 BRPM | BODY MASS INDEX: 34.72 KG/M2 | HEART RATE: 71 BPM | DIASTOLIC BLOOD PRESSURE: 80 MMHG | OXYGEN SATURATION: 97 % | WEIGHT: 256 LBS | SYSTOLIC BLOOD PRESSURE: 122 MMHG

## 2019-02-12 DIAGNOSIS — E78.5 HYPERLIPIDEMIA, UNSPECIFIED HYPERLIPIDEMIA TYPE: ICD-10-CM

## 2019-02-12 DIAGNOSIS — E66.09 CLASS 1 OBESITY DUE TO EXCESS CALORIES WITH SERIOUS COMORBIDITY AND BODY MASS INDEX (BMI) OF 34.0 TO 34.9 IN ADULT: ICD-10-CM

## 2019-02-12 DIAGNOSIS — Z71.89 DIABETES EDUCATION, ENCOUNTER FOR: ICD-10-CM

## 2019-02-12 DIAGNOSIS — E11.9 TYPE 2 DIABETES MELLITUS WITHOUT COMPLICATION, WITHOUT LONG-TERM CURRENT USE OF INSULIN (HCC): Primary | ICD-10-CM

## 2019-02-12 DIAGNOSIS — I10 ESSENTIAL HYPERTENSION: ICD-10-CM

## 2019-02-12 PROCEDURE — 4040F PNEUMOC VAC/ADMIN/RCVD: CPT | Performed by: NURSE PRACTITIONER

## 2019-02-12 PROCEDURE — 3017F COLORECTAL CA SCREEN DOC REV: CPT | Performed by: NURSE PRACTITIONER

## 2019-02-12 PROCEDURE — 95251 CONT GLUC MNTR ANALYSIS I&R: CPT | Performed by: NURSE PRACTITIONER

## 2019-02-12 PROCEDURE — G8484 FLU IMMUNIZE NO ADMIN: HCPCS | Performed by: NURSE PRACTITIONER

## 2019-02-12 PROCEDURE — 1101F PT FALLS ASSESS-DOCD LE1/YR: CPT | Performed by: NURSE PRACTITIONER

## 2019-02-12 PROCEDURE — 3045F PR MOST RECENT HEMOGLOBIN A1C LEVEL 7.0-9.0%: CPT | Performed by: NURSE PRACTITIONER

## 2019-02-12 PROCEDURE — G8427 DOCREV CUR MEDS BY ELIG CLIN: HCPCS | Performed by: NURSE PRACTITIONER

## 2019-02-12 PROCEDURE — 1123F ACP DISCUSS/DSCN MKR DOCD: CPT | Performed by: NURSE PRACTITIONER

## 2019-02-12 PROCEDURE — G8417 CALC BMI ABV UP PARAM F/U: HCPCS | Performed by: NURSE PRACTITIONER

## 2019-02-12 PROCEDURE — 99215 OFFICE O/P EST HI 40 MIN: CPT | Performed by: NURSE PRACTITIONER

## 2019-02-12 PROCEDURE — 2022F DILAT RTA XM EVC RTNOPTHY: CPT | Performed by: NURSE PRACTITIONER

## 2019-02-12 PROCEDURE — 1036F TOBACCO NON-USER: CPT | Performed by: NURSE PRACTITIONER

## 2019-02-12 RX ORDER — GLUCOSAMINE HCL/CHONDROITIN SU 500-400 MG
CAPSULE ORAL
Qty: 100 STRIP | Refills: 3 | Status: SHIPPED | OUTPATIENT
Start: 2019-02-12 | End: 2019-12-23

## 2019-02-12 ASSESSMENT — ENCOUNTER SYMPTOMS
DIARRHEA: 0
SHORTNESS OF BREATH: 0
BLURRED VISION: 0
RESPIRATORY NEGATIVE: 1
VISUAL CHANGE: 0
ABDOMINAL PAIN: 0

## 2019-02-22 LAB
DIABETIC RETINOPATHY: NEGATIVE
DIABETIC RETINOPATHY: NEGATIVE

## 2019-03-12 ENCOUNTER — OFFICE VISIT (OUTPATIENT)
Dept: INTERNAL MEDICINE | Age: 69
End: 2019-03-12
Payer: MEDICARE

## 2019-03-12 VITALS
WEIGHT: 244 LBS | DIASTOLIC BLOOD PRESSURE: 80 MMHG | SYSTOLIC BLOOD PRESSURE: 122 MMHG | OXYGEN SATURATION: 96 % | HEART RATE: 72 BPM | BODY MASS INDEX: 33.09 KG/M2

## 2019-03-12 DIAGNOSIS — I10 ESSENTIAL HYPERTENSION: ICD-10-CM

## 2019-03-12 DIAGNOSIS — E66.09 CLASS 1 OBESITY DUE TO EXCESS CALORIES WITH SERIOUS COMORBIDITY AND BODY MASS INDEX (BMI) OF 33.0 TO 33.9 IN ADULT: ICD-10-CM

## 2019-03-12 DIAGNOSIS — Z71.89 DIABETES EDUCATION, ENCOUNTER FOR: ICD-10-CM

## 2019-03-12 DIAGNOSIS — E11.9 TYPE 2 DIABETES MELLITUS WITHOUT COMPLICATION, WITHOUT LONG-TERM CURRENT USE OF INSULIN (HCC): Primary | ICD-10-CM

## 2019-03-12 DIAGNOSIS — E78.5 HYPERLIPIDEMIA, UNSPECIFIED HYPERLIPIDEMIA TYPE: ICD-10-CM

## 2019-03-12 PROCEDURE — G8484 FLU IMMUNIZE NO ADMIN: HCPCS | Performed by: NURSE PRACTITIONER

## 2019-03-12 PROCEDURE — 1123F ACP DISCUSS/DSCN MKR DOCD: CPT | Performed by: NURSE PRACTITIONER

## 2019-03-12 PROCEDURE — G8417 CALC BMI ABV UP PARAM F/U: HCPCS | Performed by: NURSE PRACTITIONER

## 2019-03-12 PROCEDURE — 1036F TOBACCO NON-USER: CPT | Performed by: NURSE PRACTITIONER

## 2019-03-12 PROCEDURE — 3045F PR MOST RECENT HEMOGLOBIN A1C LEVEL 7.0-9.0%: CPT | Performed by: NURSE PRACTITIONER

## 2019-03-12 PROCEDURE — 4040F PNEUMOC VAC/ADMIN/RCVD: CPT | Performed by: NURSE PRACTITIONER

## 2019-03-12 PROCEDURE — 2022F DILAT RTA XM EVC RTNOPTHY: CPT | Performed by: NURSE PRACTITIONER

## 2019-03-12 PROCEDURE — 95251 CONT GLUC MNTR ANALYSIS I&R: CPT | Performed by: NURSE PRACTITIONER

## 2019-03-12 PROCEDURE — G8427 DOCREV CUR MEDS BY ELIG CLIN: HCPCS | Performed by: NURSE PRACTITIONER

## 2019-03-12 PROCEDURE — 3017F COLORECTAL CA SCREEN DOC REV: CPT | Performed by: NURSE PRACTITIONER

## 2019-03-12 PROCEDURE — 1101F PT FALLS ASSESS-DOCD LE1/YR: CPT | Performed by: NURSE PRACTITIONER

## 2019-03-12 PROCEDURE — 99215 OFFICE O/P EST HI 40 MIN: CPT | Performed by: NURSE PRACTITIONER

## 2019-03-12 RX ORDER — GLIMEPIRIDE 4 MG/1
TABLET ORAL
Qty: 180 TABLET | Refills: 3
Start: 2019-03-12 | End: 2019-07-23 | Stop reason: DRUGHIGH

## 2019-03-12 ASSESSMENT — ENCOUNTER SYMPTOMS
ABDOMINAL PAIN: 0
RESPIRATORY NEGATIVE: 1
VISUAL CHANGE: 0
SHORTNESS OF BREATH: 0
BLURRED VISION: 0
DIARRHEA: 0

## 2019-04-22 ENCOUNTER — HOSPITAL ENCOUNTER (OUTPATIENT)
Dept: LAB | Age: 69
Discharge: HOME | End: 2019-04-22
Payer: MEDICARE

## 2019-04-22 ENCOUNTER — TELEPHONE (OUTPATIENT)
Dept: INTERNAL MEDICINE | Age: 69
End: 2019-04-22

## 2019-04-22 DIAGNOSIS — E11.9 TYPE 2 DIABETES MELLITUS WITHOUT COMPLICATION, WITHOUT LONG-TERM CURRENT USE OF INSULIN (HCC): ICD-10-CM

## 2019-04-22 LAB
ESTIMATED AVERAGE GLUCOSE: 128 MG/DL
HBA1C MFR BLD: 6.1 % (ref 4.8–5.9)

## 2019-04-22 PROCEDURE — 36415 COLL VENOUS BLD VENIPUNCTURE: CPT

## 2019-04-22 PROCEDURE — 83036 HEMOGLOBIN GLYCOSYLATED A1C: CPT

## 2019-04-30 ENCOUNTER — OFFICE VISIT (OUTPATIENT)
Dept: DIABETES SERVICES | Age: 69
End: 2019-04-30
Payer: MEDICARE

## 2019-04-30 VITALS
HEART RATE: 62 BPM | DIASTOLIC BLOOD PRESSURE: 68 MMHG | OXYGEN SATURATION: 97 % | SYSTOLIC BLOOD PRESSURE: 112 MMHG | BODY MASS INDEX: 32.09 KG/M2 | WEIGHT: 236.6 LBS

## 2019-04-30 DIAGNOSIS — Z71.89 DIABETES EDUCATION, ENCOUNTER FOR: ICD-10-CM

## 2019-04-30 DIAGNOSIS — I10 ESSENTIAL HYPERTENSION: ICD-10-CM

## 2019-04-30 DIAGNOSIS — E66.09 CLASS 1 OBESITY DUE TO EXCESS CALORIES WITH SERIOUS COMORBIDITY AND BODY MASS INDEX (BMI) OF 32.0 TO 32.9 IN ADULT: ICD-10-CM

## 2019-04-30 DIAGNOSIS — E11.9 TYPE 2 DIABETES MELLITUS WITHOUT COMPLICATION, WITHOUT LONG-TERM CURRENT USE OF INSULIN (HCC): Primary | ICD-10-CM

## 2019-04-30 DIAGNOSIS — E78.5 HYPERLIPIDEMIA, UNSPECIFIED HYPERLIPIDEMIA TYPE: ICD-10-CM

## 2019-04-30 PROBLEM — E66.811 CLASS 1 OBESITY DUE TO EXCESS CALORIES WITH SERIOUS COMORBIDITY AND BODY MASS INDEX (BMI) OF 33.0 TO 33.9 IN ADULT: Status: ACTIVE | Noted: 2019-04-30

## 2019-04-30 PROCEDURE — 1123F ACP DISCUSS/DSCN MKR DOCD: CPT | Performed by: NURSE PRACTITIONER

## 2019-04-30 PROCEDURE — 99215 OFFICE O/P EST HI 40 MIN: CPT | Performed by: NURSE PRACTITIONER

## 2019-04-30 PROCEDURE — 95251 CONT GLUC MNTR ANALYSIS I&R: CPT | Performed by: NURSE PRACTITIONER

## 2019-04-30 PROCEDURE — 2022F DILAT RTA XM EVC RTNOPTHY: CPT | Performed by: NURSE PRACTITIONER

## 2019-04-30 PROCEDURE — 3017F COLORECTAL CA SCREEN DOC REV: CPT | Performed by: NURSE PRACTITIONER

## 2019-04-30 PROCEDURE — 4040F PNEUMOC VAC/ADMIN/RCVD: CPT | Performed by: NURSE PRACTITIONER

## 2019-04-30 PROCEDURE — G8417 CALC BMI ABV UP PARAM F/U: HCPCS | Performed by: NURSE PRACTITIONER

## 2019-04-30 PROCEDURE — 3044F HG A1C LEVEL LT 7.0%: CPT | Performed by: NURSE PRACTITIONER

## 2019-04-30 PROCEDURE — 1036F TOBACCO NON-USER: CPT | Performed by: NURSE PRACTITIONER

## 2019-04-30 PROCEDURE — G8427 DOCREV CUR MEDS BY ELIG CLIN: HCPCS | Performed by: NURSE PRACTITIONER

## 2019-04-30 ASSESSMENT — ENCOUNTER SYMPTOMS
RESPIRATORY NEGATIVE: 1
SHORTNESS OF BREATH: 0
DIARRHEA: 0
ABDOMINAL PAIN: 0

## 2019-04-30 NOTE — PROGRESS NOTES
[address] [facility] INTERNAL MED  [address] [facility]  [phone]        HISTORY:    Divine Schaeffer presents today for evaluation and management of:  Chief Complaint   Patient presents with    Diabetes       Diabetes   He presents for his follow-up diabetic visit. He has type 2 diabetes mellitus. No MedicAlert identification noted. His disease course has been improving. Hypoglycemia symptoms include sweats and tremors. Associated symptoms include fatigue and weight loss (20 lb weight loss in 2 months). Pertinent negatives for diabetes include no chest pain, no foot paresthesias, no polydipsia, no polyphagia and no polyuria. Pertinent negatives for diabetic complications include no CVA, heart disease, nephropathy, peripheral neuropathy or retinopathy. Risk factors for coronary artery disease include diabetes mellitus, dyslipidemia, family history, hypertension, male sex and obesity. Current diabetic treatment includes oral agent (triple therapy). He is compliant with treatment all of the time. His weight is decreasing steadily. He is following a diabetic and generally healthy diet. Meal planning includes carbohydrate counting (less than 30 carbs per meal with 2 15 gm snacks). He has not had a previous visit with a dietitian. He participates in exercise intermittently (plans on walking the dog ). He monitors blood glucose at home 1-2 x per day. Blood glucose monitoring compliance is good. His home blood glucose trend is decreasing rapidly. His breakfast blood glucose is taken between 7-8 am. His breakfast blood glucose range is generally  mg/dl. An ACE inhibitor/angiotensin II receptor blocker is being taken. He does not see a podiatrist.Eye exam is current. He is here today with wife for follow up diabetes management. He has been working hard on his diet and alcohol intake. Decreased from 6-8 beer down to 0-2 and drinking light beer.  He does however complain of frequent hypoglycemia typically lunch time and waking him up during the night. He will treat with glucose tabs. .  He also states sometimes he has difficulty with his glucometer giving him error codes. This is a relatively new accu-check meter. Current Diabetic Medications  Amaryl 4 mg in the morning, 2 mg in the evening Starlix 120 mg 3 times a day with meals, metformin thousand milligrams twice a day. DKA episodes: 0    Obesity- Working on weight loss. .  He has lost 20 pounds in the past 2 months relating to dietary changes. High cholesterol-  Takes Crestor and denies any adverse effects with its use. Watches diet and exercise. Hypertension-  Takes lisinopril and denies any adverse effects with their use. Watches diet and exercise. Denies any chest pain, dizziness or edema.   Follows with cardiology:No.    Past Medical History:   Diagnosis Date    Anemia     minimal anemia, hemoglobin 13.6    Benign prostatic hypertrophy     Cyst in hand     distal phalanx, mid finger left hand    Hyperlipidemia     Hypertension     Keratosis     right forehead    Overweight(278.02)     Seborrhea     Type II or unspecified type diabetes mellitus without mention of complication, not stated as uncontrolled      Family History   Problem Relation Age of Onset    Diabetes Mother     Cancer Father         throat cancer    Stroke Father     Cancer Brother 64        pancreatic    Cancer Paternal Uncle         lung     Social History     Tobacco Use    Smoking status: Former Smoker     Types: Cigarettes     Last attempt to quit: 2000     Years since quittin.4    Smokeless tobacco: Never Used   Substance Use Topics    Alcohol use: Yes     Comment: no alcohol ingestion after a heavier intake eearlier in his life    Drug use: No     No Known Allergies    MEDICATIONS:  Current Outpatient Medications   Medication Sig Dispense Refill    glimepiride (AMARYL) 4 MG tablet TAKE 1 TABLET BY MOUTH in the morning and half a tab in the evening. 180 tablet 3    blood glucose monitor strips Test 2 times a day & as needed for symptoms of irregular blood glucose. 100 strip 3    nateglinide (STARLIX) 120 MG tablet TAKE 1 TABLET BY MOUTH 3  TIMES A DAY BEFORE MEALS 270 tablet 3    metFORMIN (GLUCOPHAGE-XR) 500 MG extended release tablet TAKE 2 TABLETS BY MOUTH TWO TIMES DAILY 360 tablet 3    lisinopril (PRINIVIL;ZESTRIL) 10 MG tablet Take 1 tablet by mouth daily 90 tablet 3    rosuvastatin (CRESTOR) 20 MG tablet TAKE ONE-HALF TABLET BY  MOUTH ONCE DAILY 45 tablet 3    Blood Glucose Monitoring Suppl (ONE TOUCH ULTRA 2) by Does not apply route. Tests blood sugar once a day       No current facility-administered medications for this visit. Review ofSymptoms:  Review of Systems   Constitutional: Positive for fatigue and weight loss (20 lb weight loss in 2 months). Negative for unexpected weight change. Eyes: Negative for visual disturbance. Respiratory: Negative. Negative for shortness of breath. Cardiovascular: Negative for chest pain and leg swelling. Gastrointestinal: Negative for abdominal pain and diarrhea. Endocrine: Negative for polydipsia, polyphagia and polyuria. Genitourinary: Negative. Musculoskeletal: Negative. Skin: Negative for rash and wound. Neurological: Positive for tremors. Psychiatric/Behavioral: Negative. Negative for decreased concentration. Theremainder of a complete 14-point review of systems is negative. Vital Signs: /68   Pulse 62   Wt 236 lb 9.6 oz (107.3 kg)   SpO2 97%   BMI 32.09 kg/m²      Wt Readings from Last 3 Encounters:   04/30/19 236 lb 9.6 oz (107.3 kg)   03/12/19 244 lb (110.7 kg)   02/12/19 256 lb (116.1 kg)     Body mass index is 32.09 kg/m².   LABS:  Hemoglobin A1C   Date Value Ref Range Status   04/22/2019 6.1 (H) 4.8 - 5.9 % Final   01/15/2019 8.2 (H) 4.8 - 5.9 % Final     Lab Results   Component Value Date    [facility] BE CALCULATED 07/13/2018     Lab Results   Component Value Date     07/13/2018    K 4.9 07/13/2018     07/13/2018    CO2 29 07/13/2018    BUN 11 07/13/2018    CREATININE 0.92 07/13/2018    GLUCOSE 181 (H) 07/13/2018    CALCIUM 8.9 07/13/2018    PROT 6.6 07/13/2018    LABALBU 4.2 07/13/2018    BILITOT 0.44 07/13/2018    ALKPHOS 53 07/13/2018    AST 20 07/13/2018    ALT 28 07/13/2018    LABGLOM >60 07/13/2018    GFRAA >60 07/13/2018     Lab Results   Component Value Date    CHOL 122 07/13/2018    CHOL 129 07/17/2017    CHOL 145 07/08/2016     Lab Results   Component Value Date    TRIG 282 (H) 07/13/2018    TRIG 293 (H) 07/17/2017    TRIG 232 (H) 07/08/2016     Lab Results   Component Value Date    HDL 28 (L) 07/13/2018    HDL 28 (L) 07/17/2017    HDL 29 (L) 07/08/2016     Lab Results   Component Value Date    LDLCHOLESTEROL 38 07/13/2018    LDLCHOLESTEROL 42 07/17/2017    LDLCHOLESTEROL 70 07/08/2016     Lab Results   Component Value Date    VLDL NOT REPORTED 07/13/2018    VLDL NOT REPORTED 07/17/2017    VLDL 46 (H) 07/08/2016     Lab Results   Component Value Date    CHOLHDLRATIO 4.4 07/13/2018    CHOLHDLRATIO 4.6 07/17/2017    CHOLHDLRATIO 5.0 (H) 07/08/2016           Physical Exam   Constitutional: He is oriented to person, place, and time. He appears well-developed and well-nourished. Eyes: Pupils are equal, round, and reactive to light. Cardiovascular: Normal rate, regular rhythm and intact distal pulses. Pulmonary/Chest: Effort normal and breath sounds normal.   Musculoskeletal: He exhibits no edema (to lower extremities). Neurological: He is alert and oriented to person, place, and time. No sensory deficit. Skin: Skin is warm, dry and intact. No lesion (no lipohypertrophy) and no rash noted. Psychiatric: He has a normal mood and affect.  His speech is normal and behavior is normal. Judgment and thought content normal. Cognition and memory are normal.       ASSESSMENT/PLAN:     Diagnosis Orders   1. Type 2 diabetes mellitus without complication, without long-term current use of insulin ([facility]     2. Diabetes education, encounter for     3. BMI 32.0-32.9,adult     4. Class 1 obesity due to excess calories with serious comorbidity and body mass index (BMI) of 32.0 to 32.9 in adult     5. Hyperlipidemia, unspecified hyperlipidemia type     6. Essential hypertension       No orders of the defined types were placed in this encounter. No orders of the defined types were placed in this encounter. Requested Prescriptions      No prescriptions requested or ordered in this encounter       1. Type 2 diabetes mellitus without complication, without long-term current use of insulin ([facility]  2. Diabetes education, encounter for  - Stable  HbA1C goal is less than 7% and blood sugars are improving.  - Encouraged patient to check BS 1 times per day. Fasting blood glucose goal is 70-130mg/dl and postprandial blood sugar goal is less than 180 mg/dl. -Diabetic foot exam reviewed and is normal and is current?: Yes.   -Diabetic retinal exam reviewed and is current? :Yes showing No diabetic retinopathy.  - Labs reviewed includes: Most recent A1c of 6.1%, which is decreased from his previous results. Microalbumin within normal limits. Creatinine 0.92, GFR >60. Repeat labs ordered Yes due in July. -We discussed in great detail dietary modifications they can make to better improve their blood sugars. --Blood sugar report reviewed and scanned into media tab. -continue to work on diet and increase physical activity. Protein snack at bedtime to prevent hypoglycemia. He was also instructed to wean off of Amaryl. Discussed signs and symptoms of hyper/hypoglycemia and how to treat. Encouraged 150 minutes of physical activity per week. Ayden Michaela plans to engage in walking for 30 minutes a day 5 times a week.  Follow a low carbohydrate diet consuming 30 grams of carbohydrates at breakfast, lunch and dinner portions of this note were completed with a voice-recognition program. Efforts were made to edit the dictation but occasionally words are mis-transcribed.)

## 2019-04-30 NOTE — PATIENT INSTRUCTIONS
Stop Amaryl in the pm for one week if you are still having lows decrease the am dose in half and if you are still having lows stop the Amaryl altogether.

## 2019-07-16 ENCOUNTER — HOSPITAL ENCOUNTER (OUTPATIENT)
Dept: LAB | Age: 69
Discharge: HOME | End: 2019-07-16
Payer: MEDICARE

## 2019-07-16 DIAGNOSIS — E11.9 TYPE 2 DIABETES MELLITUS WITHOUT COMPLICATION, WITHOUT LONG-TERM CURRENT USE OF INSULIN (HCC): ICD-10-CM

## 2019-07-16 DIAGNOSIS — I10 ESSENTIAL HYPERTENSION: ICD-10-CM

## 2019-07-16 DIAGNOSIS — Z12.5 SPECIAL SCREENING FOR MALIGNANT NEOPLASM OF PROSTATE: ICD-10-CM

## 2019-07-16 DIAGNOSIS — E78.5 HYPERLIPIDEMIA, UNSPECIFIED HYPERLIPIDEMIA TYPE: ICD-10-CM

## 2019-07-16 LAB
ALBUMIN SERPL-MCNC: 4.6 G/DL (ref 3.5–5.2)
ALBUMIN/GLOBULIN RATIO: 1.8 (ref 1–2.5)
ALP BLD-CCNC: 60 U/L (ref 40–129)
ALT SERPL-CCNC: 24 U/L (ref 5–41)
ANION GAP SERPL CALCULATED.3IONS-SCNC: 11 MMOL/L (ref 9–17)
AST SERPL-CCNC: 16 U/L
BILIRUB SERPL-MCNC: 0.31 MG/DL (ref 0.3–1.2)
BUN BLDV-MCNC: 17 MG/DL (ref 8–23)
BUN/CREAT BLD: 18 (ref 9–20)
CALCIUM SERPL-MCNC: 9.9 MG/DL (ref 8.6–10.4)
CHLORIDE BLD-SCNC: 104 MMOL/L (ref 98–107)
CHOLESTEROL/HDL RATIO: 3.4
CHOLESTEROL: 101 MG/DL
CO2: 28 MMOL/L (ref 20–31)
CREAT SERPL-MCNC: 0.96 MG/DL (ref 0.7–1.2)
CREATININE URINE: 126.2 MG/DL (ref 39–259)
ESTIMATED AVERAGE GLUCOSE: 128 MG/DL
GFR AFRICAN AMERICAN: >60 ML/MIN
GFR NON-AFRICAN AMERICAN: >60 ML/MIN
GFR SERPL CREATININE-BSD FRML MDRD: ABNORMAL ML/MIN/{1.73_M2}
GFR SERPL CREATININE-BSD FRML MDRD: ABNORMAL ML/MIN/{1.73_M2}
GLUCOSE BLD-MCNC: 139 MG/DL (ref 70–99)
HBA1C MFR BLD: 6.1 % (ref 4.8–5.9)
HDLC SERPL-MCNC: 30 MG/DL
LDL CHOLESTEROL: 51 MG/DL (ref 0–130)
MICROALBUMIN/CREAT 24H UR: <12 MG/L
MICROALBUMIN/CREAT UR-RTO: NORMAL MCG/MG CREAT
POTASSIUM SERPL-SCNC: 4.8 MMOL/L (ref 3.7–5.3)
PROSTATE SPECIFIC ANTIGEN: 1.19 UG/L
SODIUM BLD-SCNC: 143 MMOL/L (ref 135–144)
TOTAL PROTEIN: 7.2 G/DL (ref 6.4–8.3)
TRIGL SERPL-MCNC: 98 MG/DL
VLDLC SERPL CALC-MCNC: ABNORMAL MG/DL (ref 1–30)

## 2019-07-16 PROCEDURE — 82570 ASSAY OF URINE CREATININE: CPT

## 2019-07-16 PROCEDURE — 82043 UR ALBUMIN QUANTITATIVE: CPT

## 2019-07-16 PROCEDURE — G0103 PSA SCREENING: HCPCS

## 2019-07-16 PROCEDURE — 83036 HEMOGLOBIN GLYCOSYLATED A1C: CPT

## 2019-07-16 PROCEDURE — 36415 COLL VENOUS BLD VENIPUNCTURE: CPT

## 2019-07-16 PROCEDURE — 80053 COMPREHEN METABOLIC PANEL: CPT

## 2019-07-16 PROCEDURE — 80061 LIPID PANEL: CPT

## 2019-07-23 ENCOUNTER — OFFICE VISIT (OUTPATIENT)
Dept: INTERNAL MEDICINE | Age: 69
End: 2019-07-23
Payer: MEDICARE

## 2019-07-23 VITALS
SYSTOLIC BLOOD PRESSURE: 132 MMHG | HEART RATE: 64 BPM | DIASTOLIC BLOOD PRESSURE: 76 MMHG | WEIGHT: 218 LBS | BODY MASS INDEX: 29.53 KG/M2 | HEIGHT: 72 IN | RESPIRATION RATE: 16 BRPM

## 2019-07-23 DIAGNOSIS — I10 ESSENTIAL HYPERTENSION: ICD-10-CM

## 2019-07-23 DIAGNOSIS — E11.9 TYPE 2 DIABETES MELLITUS WITHOUT COMPLICATION, WITHOUT LONG-TERM CURRENT USE OF INSULIN (HCC): Primary | ICD-10-CM

## 2019-07-23 DIAGNOSIS — E78.5 HYPERLIPIDEMIA, UNSPECIFIED HYPERLIPIDEMIA TYPE: ICD-10-CM

## 2019-07-23 PROCEDURE — 3044F HG A1C LEVEL LT 7.0%: CPT | Performed by: INTERNAL MEDICINE

## 2019-07-23 PROCEDURE — 99214 OFFICE O/P EST MOD 30 MIN: CPT | Performed by: INTERNAL MEDICINE

## 2019-07-23 PROCEDURE — G8427 DOCREV CUR MEDS BY ELIG CLIN: HCPCS | Performed by: INTERNAL MEDICINE

## 2019-07-23 PROCEDURE — 2022F DILAT RTA XM EVC RTNOPTHY: CPT | Performed by: INTERNAL MEDICINE

## 2019-07-23 PROCEDURE — 4040F PNEUMOC VAC/ADMIN/RCVD: CPT | Performed by: INTERNAL MEDICINE

## 2019-07-23 PROCEDURE — 1036F TOBACCO NON-USER: CPT | Performed by: INTERNAL MEDICINE

## 2019-07-23 PROCEDURE — G8417 CALC BMI ABV UP PARAM F/U: HCPCS | Performed by: INTERNAL MEDICINE

## 2019-07-23 PROCEDURE — 1123F ACP DISCUSS/DSCN MKR DOCD: CPT | Performed by: INTERNAL MEDICINE

## 2019-07-23 PROCEDURE — 3017F COLORECTAL CA SCREEN DOC REV: CPT | Performed by: INTERNAL MEDICINE

## 2019-07-23 RX ORDER — GLIMEPIRIDE 2 MG/1
2 TABLET ORAL
COMMUNITY
End: 2019-07-30 | Stop reason: ALTCHOICE

## 2019-07-23 ASSESSMENT — ENCOUNTER SYMPTOMS
COUGH: 0
CONSTIPATION: 0
DIARRHEA: 0
NAUSEA: 0
ABDOMINAL PAIN: 0
BACK PAIN: 0
SHORTNESS OF BREATH: 0
EYE PAIN: 0
BLOOD IN STOOL: 0
VOMITING: 0

## 2019-07-23 ASSESSMENT — PATIENT HEALTH QUESTIONNAIRE - PHQ9
SUM OF ALL RESPONSES TO PHQ QUESTIONS 1-9: 0
1. LITTLE INTEREST OR PLEASURE IN DOING THINGS: 0
2. FEELING DOWN, DEPRESSED OR HOPELESS: 0
SUM OF ALL RESPONSES TO PHQ QUESTIONS 1-9: 0
SUM OF ALL RESPONSES TO PHQ9 QUESTIONS 1 & 2: 0

## 2019-07-23 NOTE — PROGRESS NOTES
Yaa Daniels received counseling on the following healthy behaviors: medication adherence  Reviewed prior labs and health maintenance  Continue current medications except where noted below, diet and exercise. Discussed use, benefit, and side effects of prescribed medications. Barriers to medication compliance addressed. Patient given educational materials - see patient instructions  Was a self-tracking handout given in paper form or via [email or web address]? No    Requested Prescriptions      No prescriptions requested or ordered in this encounter       All patient questions answered. Patient voiced understanding. Quality Measures    Body mass index is 29.57 kg/m². Elevated. Weight control planned discussed: healthy diet and regular exercise. BP: 132/76. Blood pressure is normal. Treatment plan consists of: see progress note below. Fall Risk 7/23/2019 1/22/2019 1/19/2018 7/18/2017   2 or more falls in past year? no no yes no   Fall with injury in past year? no no no no     The patient does not have a history of falls. I did , complete a risk assessment for falls.  A plan of care for falls home safety tips provided    Lab Results   Component Value Date    LDLCHOLESTEROL 51 07/16/2019    (goal LDL reduction with dx if diabetes is 50% LDL reduction)    PHQ Scores 7/23/2019 1/22/2019 1/19/2018 7/18/2017   PHQ2 Score 0 0 0 0   PHQ9 Score 0 0 0 0     Interpretation of Total Score Depression Severity: 1-4 = Minimal depression, 5-9 = Mild depression, 10-14 = Moderate depression, 15-19 = Moderately severe depression, 20-27 = Severe depression

## 2019-07-23 NOTE — PROGRESS NOTES
prostatic hypertrophy     Cyst in hand     distal phalanx, mid finger left hand    Hyperlipidemia     Hypertension     Keratosis     right forehead    Overweight(278.02)     Seborrhea     Type II or unspecified type diabetes mellitus without mention of complication, not stated as uncontrolled       History reviewed. No pertinent surgical history. Family History   Problem Relation Age of Onset    Diabetes Mother     Cancer Father         throat cancer    Stroke Father     Cancer Brother 64        pancreatic    Cancer Paternal Uncle         lung          Social History     Tobacco Use    Smoking status: Former Smoker     Packs/day: 3.00     Years: 20.00     Pack years: 60.00     Types: Cigarettes     Last attempt to quit: 2000     Years since quittin.6    Smokeless tobacco: Never Used   Substance Use Topics    Alcohol use: Yes     Comment: no alcohol ingestion after a heavier intake eearlier in his life         Current Outpatient Medications   Medication Sig Dispense Refill    glimepiride (AMARYL) 2 MG tablet Take 2 mg by mouth every morning (before breakfast)      blood glucose monitor strips Test 2 times a day & as needed for symptoms of irregular blood glucose. 100 strip 3    nateglinide (STARLIX) 120 MG tablet TAKE 1 TABLET BY MOUTH 3  TIMES A DAY BEFORE MEALS 270 tablet 3    metFORMIN (GLUCOPHAGE-XR) 500 MG extended release tablet TAKE 2 TABLETS BY MOUTH TWO TIMES DAILY 360 tablet 3    lisinopril (PRINIVIL;ZESTRIL) 10 MG tablet Take 1 tablet by mouth daily 90 tablet 3    rosuvastatin (CRESTOR) 20 MG tablet TAKE ONE-HALF TABLET BY  MOUTH ONCE DAILY 45 tablet 3    Blood Glucose Monitoring Suppl (ONE TOUCH ULTRA 2) by Does not apply route. Tests blood sugar once a day       No current facility-administered medications for this visit.       No Known Allergies    Health Maintenance   Topic Date Due    AAA screen  1950    DTaP/Tdap/Td vaccine (1 - Tdap) 1999    Colon cancer screen colonoscopy  06/23/2001    Shingles Vaccine (1 of 2) 01/22/2020 (Originally 6/23/2001)    Pneumococcal 65+ years Vaccine (1 of 2 - PCV13) 07/23/2020 (Originally 6/23/2016)    Hepatitis C screen  07/23/2020 (Originally 1950)    Flu vaccine (1) 09/01/2019    Diabetic foot exam  01/22/2020    Annual Wellness Visit (AWV)  01/22/2020    A1C test (Diabetic or Prediabetic)  07/16/2020    Diabetic microalbuminuria test  07/16/2020    Lipid screen  07/16/2020    Potassium monitoring  07/16/2020    Creatinine monitoring  07/16/2020    Diabetic retinal exam  02/22/2021       Subjective:      Review of Systems   Constitutional: Negative for chills and fever. HENT: Negative for hearing loss. Eyes: Negative for pain and visual disturbance. Respiratory: Negative for cough and shortness of breath. Cardiovascular: Negative for chest pain, palpitations and leg swelling. Gastrointestinal: Negative for abdominal pain, blood in stool, constipation, diarrhea, nausea and vomiting. Endocrine: Negative for cold intolerance, polydipsia and polyuria. Genitourinary: Negative for difficulty urinating, dysuria and hematuria. Musculoskeletal: Negative for arthralgias, back pain, gait problem and neck pain. Skin: Negative for pallor and rash. Neurological: Negative for dizziness, weakness, numbness and headaches. Hematological: Negative for adenopathy. Does not bruise/bleed easily. Psychiatric/Behavioral: Negative for confusion. The patient is not nervous/anxious. Objective:     Physical Exam   Constitutional: He is oriented to person, place, and time. He appears well-developed and well-nourished. HENT:   Head: Normocephalic and atraumatic. Eyes: Pupils are equal, round, and reactive to light. EOM are normal.   Neck: Neck supple. Cardiovascular: Normal rate and regular rhythm. Exam reveals no gallop and no friction rub. No murmur heard.   Pulmonary/Chest: Effort normal and

## 2019-07-30 ENCOUNTER — OFFICE VISIT (OUTPATIENT)
Dept: DIABETES SERVICES | Age: 69
End: 2019-07-30
Payer: MEDICARE

## 2019-07-30 VITALS
OXYGEN SATURATION: 98 % | HEART RATE: 70 BPM | WEIGHT: 218 LBS | DIASTOLIC BLOOD PRESSURE: 60 MMHG | BODY MASS INDEX: 29.57 KG/M2 | SYSTOLIC BLOOD PRESSURE: 102 MMHG

## 2019-07-30 DIAGNOSIS — E11.9 TYPE 2 DIABETES MELLITUS WITHOUT COMPLICATION, WITHOUT LONG-TERM CURRENT USE OF INSULIN (HCC): Primary | ICD-10-CM

## 2019-07-30 DIAGNOSIS — E78.5 HYPERLIPIDEMIA, UNSPECIFIED HYPERLIPIDEMIA TYPE: ICD-10-CM

## 2019-07-30 DIAGNOSIS — I10 ESSENTIAL HYPERTENSION: ICD-10-CM

## 2019-07-30 DIAGNOSIS — Z71.89 DIABETES EDUCATION, ENCOUNTER FOR: ICD-10-CM

## 2019-07-30 DIAGNOSIS — E16.2 HYPOGLYCEMIA: ICD-10-CM

## 2019-07-30 PROBLEM — E66.09 CLASS 1 OBESITY DUE TO EXCESS CALORIES WITH SERIOUS COMORBIDITY AND BODY MASS INDEX (BMI) OF 32.0 TO 32.9 IN ADULT: Status: RESOLVED | Noted: 2019-04-30 | Resolved: 2019-07-30

## 2019-07-30 PROBLEM — E66.811 CLASS 1 OBESITY DUE TO EXCESS CALORIES WITH SERIOUS COMORBIDITY AND BODY MASS INDEX (BMI) OF 32.0 TO 32.9 IN ADULT: Status: RESOLVED | Noted: 2019-04-30 | Resolved: 2019-07-30

## 2019-07-30 PROCEDURE — 4040F PNEUMOC VAC/ADMIN/RCVD: CPT | Performed by: NURSE PRACTITIONER

## 2019-07-30 PROCEDURE — 2022F DILAT RTA XM EVC RTNOPTHY: CPT | Performed by: NURSE PRACTITIONER

## 2019-07-30 PROCEDURE — G8427 DOCREV CUR MEDS BY ELIG CLIN: HCPCS | Performed by: NURSE PRACTITIONER

## 2019-07-30 PROCEDURE — 99215 OFFICE O/P EST HI 40 MIN: CPT | Performed by: NURSE PRACTITIONER

## 2019-07-30 PROCEDURE — 3044F HG A1C LEVEL LT 7.0%: CPT | Performed by: NURSE PRACTITIONER

## 2019-07-30 PROCEDURE — 1036F TOBACCO NON-USER: CPT | Performed by: NURSE PRACTITIONER

## 2019-07-30 PROCEDURE — G8417 CALC BMI ABV UP PARAM F/U: HCPCS | Performed by: NURSE PRACTITIONER

## 2019-07-30 PROCEDURE — 1123F ACP DISCUSS/DSCN MKR DOCD: CPT | Performed by: NURSE PRACTITIONER

## 2019-07-30 PROCEDURE — 3017F COLORECTAL CA SCREEN DOC REV: CPT | Performed by: NURSE PRACTITIONER

## 2019-07-30 ASSESSMENT — ENCOUNTER SYMPTOMS
RESPIRATORY NEGATIVE: 1
DIARRHEA: 0
SHORTNESS OF BREATH: 0
ABDOMINAL PAIN: 0

## 2019-07-30 NOTE — PROGRESS NOTES
[address] [facility] INTERNAL MED  [address], [address]  [facility] [address] [phone]        HISTORY:    Ruthie Diamond presents today for evaluation and management of:  Chief Complaint   Patient presents with    Diabetes       Diabetes   He presents for his follow-up diabetic visit. He has type 2 diabetes mellitus. No MedicAlert identification noted. His disease course has been improving. Hypoglycemia symptoms include confusion, sweats and tremors. Pertinent negatives for hypoglycemia include no dizziness, headaches or seizures. (Frequent most days in the evenings. ) There are no diabetic associated symptoms. Pertinent negatives for diabetes include no chest pain, no polydipsia, no polyphagia and no polyuria. Symptoms are stable. There are no diabetic complications. Risk factors for coronary artery disease include diabetes mellitus, dyslipidemia, family history, hypertension, male sex and obesity. Current diabetic treatment includes oral agent (monotherapy). He is compliant with treatment most of the time. His weight is decreasing steadily. He is following a diabetic and generally healthy diet. Meal planning includes avoidance of concentrated sweets. He has not had a previous visit with a dietitian. He participates in exercise daily. He monitors blood glucose at home 1-2 x per day. Blood glucose monitoring compliance is adequate. His home blood glucose trend is decreasing steadily. An ACE inhibitor/angiotensin II receptor blocker is being taken. Eye exam is current. Patient is here for follow-up diabetes education. He has been working on his diet and exercise his weight is steadily declining. He relates this to the drastic changes in his diet he is also cut out almost all of his alcohol.   He is testing his blood sugars every morning ranging in the 90s to 120s he is having frequent hypoglycemic events typically after supper before bedtime hypoglycemia is worse when he does drink encounter. Requested Prescriptions      No prescriptions requested or ordered in this encounter       1. Type 2 diabetes mellitus without complication, without long-term current use of insulin ([facility] [address]  2. Diabetes education, encounter for  3. Hypoglycemia  - Unstable  HbA1C goal is less than 7% and blood sugars are improving.  - Encouraged patient to check BS 2 times per day. Fasting blood glucose goal is 70-130mg/dl and postprandial blood sugar goal is less than 180 mg/dl. -Diabetic foot exam reviewed and is normal and is current?: Yes.   -Diabetic retinal exam reviewed and is current? :Yes showing No diabetic retinopathy.  - Labs reviewed includes: Most recent A1c of 6.1% although patient is not having hypoglycemia he is having frequent hypoglycemia would like to eliminate the frequency of hypoglycemia patient is experience. Creatinine 0.96 GFR >60. Repeat labs due in 3 months.   -We discussed in great detail dietary modifications they can make to better improve their blood sugars. --Blood sugar report reviewed and scanned into media tab. -Discontinue glimepiride. test blood sugars before and after dinner if those blood sugars are well controlled we will work on [employer].  -Continue to work on diet and exercise increase protein and fiber. Call office with ongoing hypoglycemia. Discussed signs and symptoms of hyper/hypoglycemia and how to treat. Encouraged 150 minutes of physical activity per week. Follow a low carbohydrate diet consuming 45 grams of carbohydrates at breakfast, lunch and dinner with two separate snacks [id number] grams of carbohydrates. Encouraged at least 7 hours of sleep. The patient was informed of the goals of diabetes management. This can only be accomplished by watching their diet and exercise levels. We certainly use medicines to help attain these goals. The consequences of not controlling blood sugars were discussed.  These include blindness, heart disease, stroke,

## 2019-09-30 ENCOUNTER — TELEPHONE (OUTPATIENT)
Dept: INTERNAL MEDICINE | Age: 69
End: 2019-09-30

## 2019-10-04 ENCOUNTER — OFFICE VISIT (OUTPATIENT)
Dept: INTERNAL MEDICINE | Age: 69
End: 2019-10-04
Payer: MEDICARE

## 2019-10-04 ENCOUNTER — HOSPITAL ENCOUNTER (OUTPATIENT)
Dept: LAB | Age: 69
Discharge: HOME | End: 2019-10-04
Payer: MEDICARE

## 2019-10-04 VITALS
BODY MASS INDEX: 28.99 KG/M2 | SYSTOLIC BLOOD PRESSURE: 104 MMHG | HEART RATE: 68 BPM | TEMPERATURE: 98.2 F | RESPIRATION RATE: 16 BRPM | DIASTOLIC BLOOD PRESSURE: 60 MMHG | HEIGHT: 72 IN | WEIGHT: 214 LBS

## 2019-10-04 DIAGNOSIS — Z09 HOSPITAL DISCHARGE FOLLOW-UP: Primary | ICD-10-CM

## 2019-10-04 DIAGNOSIS — E11.9 TYPE 2 DIABETES MELLITUS WITHOUT COMPLICATION, WITHOUT LONG-TERM CURRENT USE OF INSULIN (HCC): ICD-10-CM

## 2019-10-04 DIAGNOSIS — D64.9 ANEMIA, UNSPECIFIED TYPE: ICD-10-CM

## 2019-10-04 DIAGNOSIS — K63.89 PNEUMATOSIS OF INTESTINES: ICD-10-CM

## 2019-10-04 DIAGNOSIS — E78.5 HYPERLIPIDEMIA, UNSPECIFIED HYPERLIPIDEMIA TYPE: ICD-10-CM

## 2019-10-04 DIAGNOSIS — K57.12 DIVERTICULITIS OF SMALL INTESTINE, UNSPECIFIED BLEEDING STATUS, UNSPECIFIED COMPLICATION STATUS: ICD-10-CM

## 2019-10-04 DIAGNOSIS — I10 ESSENTIAL HYPERTENSION: ICD-10-CM

## 2019-10-04 DIAGNOSIS — D64.9 ANEMIA, UNSPECIFIED TYPE: Primary | ICD-10-CM

## 2019-10-04 LAB
ABSOLUTE EOS #: 0.2 K/UL (ref 0–0.4)
ABSOLUTE IMMATURE GRANULOCYTE: ABNORMAL K/UL (ref 0–0.3)
ABSOLUTE LYMPH #: 1.4 K/UL (ref 1–4.8)
ABSOLUTE MONO #: 0.7 K/UL (ref 0.1–1.2)
BASOPHILS # BLD: 0 % (ref 0–2)
BASOPHILS ABSOLUTE: 0.1 K/UL (ref 0–0.2)
DIFFERENTIAL TYPE: ABNORMAL
EOSINOPHILS RELATIVE PERCENT: 1 % (ref 1–8)
HCT VFR BLD CALC: 35.8 % (ref 41–53)
HEMOGLOBIN: 11.7 G/DL (ref 13.5–17.5)
IMMATURE GRANULOCYTES: ABNORMAL %
LYMPHOCYTES # BLD: 12 % (ref 15–43)
MCH RBC QN AUTO: 30.3 PG (ref 26–34)
MCHC RBC AUTO-ENTMCNC: 32.5 G/DL (ref 31–37)
MCV RBC AUTO: 93.1 FL (ref 80–100)
MONOCYTES # BLD: 6 % (ref 6–14)
NRBC AUTOMATED: ABNORMAL PER 100 WBC
PDW BLD-RTO: 13.7 % (ref 11–14.5)
PLATELET # BLD: 338 K/UL (ref 140–450)
PLATELET ESTIMATE: ABNORMAL
PMV BLD AUTO: 8 FL (ref 6–12)
RBC # BLD: 3.85 M/UL (ref 4.5–5.9)
RBC # BLD: ABNORMAL 10*6/UL
SEG NEUTROPHILS: 81 % (ref 44–74)
SEGMENTED NEUTROPHILS ABSOLUTE COUNT: 9.3 K/UL (ref 1.8–7.7)
WBC # BLD: 11.7 K/UL (ref 3.5–11)
WBC # BLD: ABNORMAL 10*3/UL

## 2019-10-04 PROCEDURE — 85025 COMPLETE CBC W/AUTO DIFF WBC: CPT

## 2019-10-04 PROCEDURE — 1111F DSCHRG MED/CURRENT MED MERGE: CPT | Performed by: NURSE PRACTITIONER

## 2019-10-04 PROCEDURE — 36415 COLL VENOUS BLD VENIPUNCTURE: CPT

## 2019-10-04 PROCEDURE — 99495 TRANSJ CARE MGMT MOD F2F 14D: CPT | Performed by: NURSE PRACTITIONER

## 2019-10-04 RX ORDER — CIPROFLOXACIN 500 MG/1
TABLET, FILM COATED ORAL
Refills: 0 | COMMUNITY
Start: 2019-09-29 | End: 2019-11-07 | Stop reason: ALTCHOICE

## 2019-10-04 RX ORDER — METRONIDAZOLE 500 MG/1
TABLET ORAL
Refills: 0 | COMMUNITY
Start: 2019-09-29 | End: 2019-11-07 | Stop reason: ALTCHOICE

## 2019-10-04 ASSESSMENT — ENCOUNTER SYMPTOMS
BLOOD IN STOOL: 0
DIARRHEA: 1
NAUSEA: 0
SHORTNESS OF BREATH: 0
CHEST TIGHTNESS: 0
VOMITING: 0
SORE THROAT: 0

## 2019-11-07 ENCOUNTER — OFFICE VISIT (OUTPATIENT)
Dept: DIABETES SERVICES | Age: 69
End: 2019-11-07
Payer: MEDICARE

## 2019-11-07 VITALS
DIASTOLIC BLOOD PRESSURE: 68 MMHG | SYSTOLIC BLOOD PRESSURE: 110 MMHG | BODY MASS INDEX: 27.53 KG/M2 | HEART RATE: 72 BPM | WEIGHT: 203 LBS | RESPIRATION RATE: 16 BRPM | OXYGEN SATURATION: 98 %

## 2019-11-07 DIAGNOSIS — I10 ESSENTIAL HYPERTENSION: ICD-10-CM

## 2019-11-07 DIAGNOSIS — E11.9 TYPE 2 DIABETES MELLITUS WITHOUT COMPLICATION, WITHOUT LONG-TERM CURRENT USE OF INSULIN (HCC): Primary | ICD-10-CM

## 2019-11-07 DIAGNOSIS — E78.5 HYPERLIPIDEMIA, UNSPECIFIED HYPERLIPIDEMIA TYPE: ICD-10-CM

## 2019-11-07 DIAGNOSIS — Z71.89 DIABETES EDUCATION, ENCOUNTER FOR: ICD-10-CM

## 2019-11-07 PROCEDURE — G8427 DOCREV CUR MEDS BY ELIG CLIN: HCPCS | Performed by: NURSE PRACTITIONER

## 2019-11-07 PROCEDURE — G8484 FLU IMMUNIZE NO ADMIN: HCPCS | Performed by: NURSE PRACTITIONER

## 2019-11-07 PROCEDURE — 1036F TOBACCO NON-USER: CPT | Performed by: NURSE PRACTITIONER

## 2019-11-07 PROCEDURE — G8417 CALC BMI ABV UP PARAM F/U: HCPCS | Performed by: NURSE PRACTITIONER

## 2019-11-07 PROCEDURE — 2022F DILAT RTA XM EVC RTNOPTHY: CPT | Performed by: NURSE PRACTITIONER

## 2019-11-07 PROCEDURE — 4040F PNEUMOC VAC/ADMIN/RCVD: CPT | Performed by: NURSE PRACTITIONER

## 2019-11-07 PROCEDURE — 3017F COLORECTAL CA SCREEN DOC REV: CPT | Performed by: NURSE PRACTITIONER

## 2019-11-07 PROCEDURE — 1123F ACP DISCUSS/DSCN MKR DOCD: CPT | Performed by: NURSE PRACTITIONER

## 2019-11-07 PROCEDURE — 99214 OFFICE O/P EST MOD 30 MIN: CPT | Performed by: NURSE PRACTITIONER

## 2019-11-07 PROCEDURE — 3044F HG A1C LEVEL LT 7.0%: CPT | Performed by: NURSE PRACTITIONER

## 2019-11-07 ASSESSMENT — ENCOUNTER SYMPTOMS
DIARRHEA: 0
RESPIRATORY NEGATIVE: 1
ABDOMINAL PAIN: 0
SHORTNESS OF BREATH: 0

## 2019-11-27 ENCOUNTER — HOSPITAL ENCOUNTER (OUTPATIENT)
Dept: LAB | Age: 69
Discharge: HOME | End: 2019-11-27
Payer: MEDICARE

## 2019-11-27 DIAGNOSIS — E11.9 TYPE 2 DIABETES MELLITUS WITHOUT COMPLICATION, WITHOUT LONG-TERM CURRENT USE OF INSULIN (HCC): Primary | ICD-10-CM

## 2019-11-27 DIAGNOSIS — D64.9 ANEMIA, UNSPECIFIED TYPE: ICD-10-CM

## 2019-11-27 DIAGNOSIS — E11.9 TYPE 2 DIABETES MELLITUS WITHOUT COMPLICATION, WITHOUT LONG-TERM CURRENT USE OF INSULIN (HCC): ICD-10-CM

## 2019-11-27 LAB
ABSOLUTE EOS #: 0.1 K/UL (ref 0–0.4)
ABSOLUTE IMMATURE GRANULOCYTE: ABNORMAL K/UL (ref 0–0.3)
ABSOLUTE LYMPH #: 1.8 K/UL (ref 1–4.8)
ABSOLUTE MONO #: 0.4 K/UL (ref 0.1–1.2)
BASOPHILS # BLD: 1 % (ref 0–2)
BASOPHILS ABSOLUTE: 0.1 K/UL (ref 0–0.2)
DIFFERENTIAL TYPE: ABNORMAL
EOSINOPHILS RELATIVE PERCENT: 2 % (ref 1–8)
ESTIMATED AVERAGE GLUCOSE: 126 MG/DL
HBA1C MFR BLD: 6 % (ref 4.8–5.9)
HCT VFR BLD CALC: 40.4 % (ref 41–53)
HEMOGLOBIN: 13.5 G/DL (ref 13.5–17.5)
IMMATURE GRANULOCYTES: ABNORMAL %
LYMPHOCYTES # BLD: 30 % (ref 15–43)
MCH RBC QN AUTO: 30.8 PG (ref 26–34)
MCHC RBC AUTO-ENTMCNC: 33.4 G/DL (ref 31–37)
MCV RBC AUTO: 92.2 FL (ref 80–100)
MONOCYTES # BLD: 6 % (ref 6–14)
NRBC AUTOMATED: ABNORMAL PER 100 WBC
PDW BLD-RTO: 13.6 % (ref 11–14.5)
PLATELET # BLD: 217 K/UL (ref 140–450)
PLATELET ESTIMATE: ABNORMAL
PMV BLD AUTO: 8.4 FL (ref 6–12)
RBC # BLD: 4.38 M/UL (ref 4.5–5.9)
RBC # BLD: ABNORMAL 10*6/UL
SEG NEUTROPHILS: 61 % (ref 44–74)
SEGMENTED NEUTROPHILS ABSOLUTE COUNT: 3.8 K/UL (ref 1.8–7.7)
WBC # BLD: 6.2 K/UL (ref 3.5–11)
WBC # BLD: ABNORMAL 10*3/UL

## 2019-11-27 PROCEDURE — 36415 COLL VENOUS BLD VENIPUNCTURE: CPT

## 2019-11-27 PROCEDURE — 85025 COMPLETE CBC W/AUTO DIFF WBC: CPT

## 2019-11-27 PROCEDURE — 83036 HEMOGLOBIN GLYCOSYLATED A1C: CPT

## 2020-01-09 LAB — DIABETIC RETINOPATHY: NEGATIVE

## 2020-01-27 ENCOUNTER — TELEPHONE (OUTPATIENT)
Dept: SURGERY | Age: 70
End: 2020-01-27

## 2020-01-27 ENCOUNTER — OFFICE VISIT (OUTPATIENT)
Dept: INTERNAL MEDICINE | Age: 70
End: 2020-01-27
Payer: MEDICARE

## 2020-01-27 VITALS
HEIGHT: 72 IN | HEART RATE: 68 BPM | RESPIRATION RATE: 16 BRPM | BODY MASS INDEX: 27.63 KG/M2 | WEIGHT: 204 LBS | SYSTOLIC BLOOD PRESSURE: 130 MMHG | DIASTOLIC BLOOD PRESSURE: 86 MMHG

## 2020-01-27 PROCEDURE — G0439 PPPS, SUBSEQ VISIT: HCPCS | Performed by: INTERNAL MEDICINE

## 2020-01-27 PROCEDURE — 99214 OFFICE O/P EST MOD 30 MIN: CPT

## 2020-01-27 PROCEDURE — 2022F DILAT RTA XM EVC RTNOPTHY: CPT | Performed by: INTERNAL MEDICINE

## 2020-01-27 PROCEDURE — 3017F COLORECTAL CA SCREEN DOC REV: CPT | Performed by: INTERNAL MEDICINE

## 2020-01-27 PROCEDURE — 1036F TOBACCO NON-USER: CPT | Performed by: INTERNAL MEDICINE

## 2020-01-27 PROCEDURE — 3046F HEMOGLOBIN A1C LEVEL >9.0%: CPT | Performed by: INTERNAL MEDICINE

## 2020-01-27 PROCEDURE — 4040F PNEUMOC VAC/ADMIN/RCVD: CPT | Performed by: INTERNAL MEDICINE

## 2020-01-27 PROCEDURE — G8417 CALC BMI ABV UP PARAM F/U: HCPCS | Performed by: INTERNAL MEDICINE

## 2020-01-27 PROCEDURE — G8427 DOCREV CUR MEDS BY ELIG CLIN: HCPCS | Performed by: INTERNAL MEDICINE

## 2020-01-27 PROCEDURE — 1123F ACP DISCUSS/DSCN MKR DOCD: CPT | Performed by: INTERNAL MEDICINE

## 2020-01-27 PROCEDURE — G8484 FLU IMMUNIZE NO ADMIN: HCPCS | Performed by: INTERNAL MEDICINE

## 2020-01-27 PROCEDURE — 99214 OFFICE O/P EST MOD 30 MIN: CPT | Performed by: INTERNAL MEDICINE

## 2020-01-27 RX ORDER — NATEGLINIDE 60 MG/1
60 TABLET ORAL
COMMUNITY
End: 2020-03-19 | Stop reason: SDUPTHER

## 2020-01-27 ASSESSMENT — LIFESTYLE VARIABLES
HOW OFTEN DURING THE LAST YEAR HAVE YOU FAILED TO DO WHAT WAS NORMALLY EXPECTED FROM YOU BECAUSE OF DRINKING: 0
HOW OFTEN DO YOU HAVE SIX OR MORE DRINKS ON ONE OCCASION: 0
HOW OFTEN DURING THE LAST YEAR HAVE YOU BEEN UNABLE TO REMEMBER WHAT HAPPENED THE NIGHT BEFORE BECAUSE YOU HAD BEEN DRINKING: 0
HAS A RELATIVE, FRIEND, DOCTOR, OR ANOTHER HEALTH PROFESSIONAL EXPRESSED CONCERN ABOUT YOUR DRINKING OR SUGGESTED YOU CUT DOWN: 0
HAVE YOU OR SOMEONE ELSE BEEN INJURED AS A RESULT OF YOUR DRINKING: 0
HOW MANY STANDARD DRINKS CONTAINING ALCOHOL DO YOU HAVE ON A TYPICAL DAY: 2
HOW OFTEN DURING THE LAST YEAR HAVE YOU NEEDED AN ALCOHOLIC DRINK FIRST THING IN THE MORNING TO GET YOURSELF GOING AFTER A NIGHT OF HEAVY DRINKING: 0
HOW OFTEN DURING THE LAST YEAR HAVE YOU FOUND THAT YOU WERE NOT ABLE TO STOP DRINKING ONCE YOU HAD STARTED: 0
HOW OFTEN DURING THE LAST YEAR HAVE YOU HAD A FEELING OF GUILT OR REMORSE AFTER DRINKING: 0

## 2020-01-27 ASSESSMENT — ENCOUNTER SYMPTOMS
VOMITING: 0
SHORTNESS OF BREATH: 0
BLOOD IN STOOL: 0
COUGH: 0
ABDOMINAL PAIN: 0
BACK PAIN: 0
DIARRHEA: 0
CONSTIPATION: 0
EYE PAIN: 0
NAUSEA: 0

## 2020-01-27 ASSESSMENT — PATIENT HEALTH QUESTIONNAIRE - PHQ9
SUM OF ALL RESPONSES TO PHQ QUESTIONS 1-9: 0
SUM OF ALL RESPONSES TO PHQ QUESTIONS 1-9: 0

## 2020-01-27 NOTE — PROGRESS NOTES
[address] [facility] INTERNAL [facility]  [address]  [facility] [address]  Dept: [phone]  Dept Fax: [phone]  Loc: [phone]     Billy Wyatt is a 77 y.o. male who presents today for his medical conditions/complaintsas noted below. Billy Wyatt is c/o of   Chief Complaint   Patient presents with    Medicare AWV    Diabetes     6 month appt    Hypertension    Hyperlipidemia         HPI:     Diabetes   He presents for his follow-up diabetic visit. He has type 2 (Without complication, without insulin use) diabetes mellitus. His disease course has been fluctuating. Pertinent negatives for hypoglycemia include no confusion, dizziness, headaches, nervousness/anxiousness or pallor. Pertinent negatives for diabetes include no chest pain, no polydipsia, no polyuria and no weakness. Hypertension   This is a chronic problem. The current episode started more than 1 year ago. The problem has been waxing and waning since onset. The problem is controlled. Pertinent negatives include no chest pain, headaches, neck pain, palpitations or shortness of breath. Hyperlipidemia   This is a chronic problem. The current episode started more than 1 year ago. The problem is controlled. Recent lipid tests were reviewed and are variable. Pertinent negatives include no chest pain or shortness of breath. Other   This is a recurrent (4-General medical exam) problem. The current episode started today. The problem occurs intermittently. The problem has been waxing and waning. Pertinent negatives include no abdominal pain, arthralgias, chest pain, chills, coughing, fever, headaches, nausea, neck pain, numbness, rash, vomiting or weakness. Hemoglobin A1C (%)   Date Value   11/27/2019 6.0 (H)   07/16/2019 6.1 (H)   04/22/2019 6.1 (H)            Microalb/Crt.  Ratio (mcg/mg creat)   Date Value   07/16/2019 CANNOT BE CALCULATED     LDL Cholesterol (mg/dL)   Date Value   07/16/2019 51   07/13/2018 38 2017 42         AST (U/L)   Date Value   2019 16     ALT (U/L)   Date Value   2019 24     BUN (mg/dL)   Date Value   2019 17     BP Readings from Last 3 Encounters:   20 130/86   19 110/68   10/04/19 104/60              Past Medical History:   Diagnosis Date    Anemia     minimal anemia, hemoglobin 13.6    Benign prostatic hypertrophy     Cyst in hand     distal phalanx, mid finger left hand    Hyperlipidemia     Hypertension     Keratosis     right forehead    Overweight(278.02)     Seborrhea     Type II or unspecified type diabetes mellitus without mention of complication, not stated as uncontrolled       History reviewed. No pertinent surgical history.     Family History   Problem Relation Age of Onset    Diabetes Mother     Cancer Father         throat cancer    Stroke Father     Cancer Brother 64        pancreatic    Cancer Paternal Uncle         lung          Social History     Tobacco Use    Smoking status: Former Smoker     Packs/day: 3.00     Years: 20.00     Pack years: 60.00     Types: Cigarettes     Last attempt to quit: 2000     Years since quittin.1    Smokeless tobacco: Never Used   Substance Use Topics    Alcohol use: Yes     Comment: no alcohol ingestion after a heavier intake lizzy in his life         Current Outpatient Medications   Medication Sig Dispense Refill    nateglinide (STARLIX) 60 MG tablet Take 60 mg by mouth 3 times daily (before meals)      metFORMIN (GLUCOPHAGE-XR) 500 MG extended release tablet TAKE 2 TABLETS BY MOUTH TWO TIMES DAILY 360 tablet 3    lisinopril (PRINIVIL;ZESTRIL) 10 MG tablet Take 1 tablet by mouth daily 90 tablet 3    rosuvastatin (CRESTOR) 20 MG tablet TAKE ONE-HALF TABLET BY  MOUTH ONCE DAILY 45 tablet 3    blood glucose test strips (ACCU-CHEK BYRON PLUS) strip TEST TWO TIMES A DAY AND AS NEEDED FOR SYMPTOMS OF IRREGULAR BLOOD GLUCOSE 100 strip 2    Blood Glucose Monitoring Suppl (ONE TOUCH [facility]             Plan:       Return in about 6 months (around 7/27/2020) for Hypertension, Diabetes, Hyperlipidemia. Orders Placed This Encounter   Procedures    CBC Auto Differential     Standing Status:   Future     Standing Expiration Date:   1/26/2021    Comprehensive Metabolic Panel     Standing Status:   Future     Standing Expiration Date:   1/26/2021    Lipid Panel     Standing Status:   Future     Standing Expiration Date:   1/26/2021     Order Specific Question:   Is Patient Fasting?/# of Hours     Answer:   yes    Psa screening     Standing Status:   Future     Standing Expiration Date:   1/26/2021    Hemoglobin A1C     Standing Status:   Future     Standing Expiration Date:   1/26/2021    Microalbumin, Ur     Standing Status:   Future     Standing Expiration Date:   1/26/2021    Hemoglobin A1C     Standing Status:   Future     Standing Expiration Date:   1/27/2021   [facility] General Surgery, [place]     Referral Priority:   Routine     Referral Type:   Eval and Treat     Referral Reason:   Specialty Services Required     Requested Specialty:   General Surgery     Number of Visits Requested:   [facility] Vascular Surgery     Referral Priority:   Routine     Referral Type:   Eval and Treat     Referral Reason:   Specialty Services Required     Requested Specialty:   Vascular Surgery     Number of Visits Requested:   1     DIABETES FOOT EXAM     No orders of the defined types were placed in this encounter. Patientgiven educational materials - see patient instructions. Discussed use, benefit,and side effects of prescribed medications. All patient questions answered. Ptvoiced understanding. Reviewed health maintenance. Instructed to continue currentmedications, diet and exercise. Patient agreed with treatment plan. Follow up asdirected.      Electronically signed by Koko Manley MD on 1/27/2020 at 9:06 AM

## 2020-01-27 NOTE — PROGRESS NOTES
Father         throat cancer    Stroke Father     Cancer Brother 64        pancreatic    Cancer Paternal Uncle         lung       CareTeam (Including outside providers/suppliers regularly involved in providing care):   Patient Care Team:  Taiwo Rose MD as PCP - General (Internal Medicine)  Taiwo Rose MD as PCP - REHABILITATION [facility] Empaneled Provider  AXEL Medina CNP as Nurse Practitioner (Nurse Practitioner)    Wt Readings from Last 3 Encounters:   01/27/20 204 lb (92.5 kg)   11/07/19 203 lb (92.1 kg)   10/04/19 214 lb (97.1 kg)     Vitals:    01/27/20 0830   BP: 130/86   Site: Left Upper Arm   Position: Sitting   Cuff Size: Medium Adult   Pulse: 68   Resp: 16   Weight: 204 lb (92.5 kg)   Height: 6' (1.829 m)     Body mass index is 27.67 kg/m². Based upon direct observation of the patient, evaluation of cognition reveals none. Patient's complete Health Risk Assessment and screening values have been reviewed and are found in Flowsheets. The following problems were reviewed today and where indicated follow up appointments were made and/or referrals ordered. Positive Risk Factor Screenings with Interventions:     Health Habits/Nutrition:  Health Habits/Nutrition  Do you exercise for at least 20 minutes 2-3 times per week?: Yes  Have you lost any weight without trying in the past 3 months?: No  Do you eat fewer than 2 meals per day?: No  Have you seen a dentist within the past year?: (!) No  Body mass index is 27.67 kg/m². Health Habits/Nutrition Interventions:  · Dental exam overdue:  patient encouraged to make appointment with his/her dentist, .     Personalized Preventive Plan   Current Health Maintenance Status  Immunization History   Administered Date(s) Administered    Hepatitis B 12/17/1999    Influenza Virus Vaccine 11/26/2007, 11/24/2008, 12/17/2012, 11/22/2018    Td, unspecified formulation 12/17/1999        Health Maintenance   Topic Date Due    DTaP/Tdap/Td vaccine (1 - Tdap) 06/23/1962    Shingles Vaccine (1 of 2) 06/23/2001    Colon cancer screen colonoscopy  06/23/2001    Annual Wellness Visit (AWV)  05/29/2019    Diabetic foot exam  01/22/2020    Pneumococcal 65+ years Vaccine (1 of 1 - PPSV23) 07/23/2020 (Originally 6/23/2016)    Hepatitis C screen  07/23/2020 (Originally 1950)    Diabetic microalbuminuria test  07/16/2020    Lipid screen  07/16/2020    Potassium monitoring  07/16/2020    Creatinine monitoring  07/16/2020    A1C test (Diabetic or Prediabetic)  11/27/2020    Diabetic retinal exam  01/09/2022    Flu vaccine  Completed    AAA screen  Completed     Recommendations for Preventive Services Due: see orders and patient instructions/AVS.  . Recommended screening schedule for the next 5-10 years is provided to the patient in written form: see Patient Instructions/AVS.    IJenn LPN, 5/72/6109, performed the documented evaluation under the direct supervision of the attending physician. I, Dr. Ariel Talbot, directly supervised the performance of this Medicare annual wellness visit.

## 2020-03-04 RX ORDER — ROSUVASTATIN CALCIUM 20 MG/1
TABLET, COATED ORAL
Qty: 45 TABLET | Refills: 3 | Status: SHIPPED | OUTPATIENT
Start: 2020-03-04 | End: 2020-07-30 | Stop reason: DRUGHIGH

## 2020-03-04 RX ORDER — LISINOPRIL 10 MG/1
10 TABLET ORAL DAILY
Qty: 90 TABLET | Refills: 3 | Status: SHIPPED | OUTPATIENT
Start: 2020-03-04 | End: 2020-12-14

## 2020-03-12 ENCOUNTER — OFFICE VISIT (OUTPATIENT)
Dept: VASCULAR SURGERY | Age: 70
End: 2020-03-12
Payer: MEDICARE

## 2020-03-12 ENCOUNTER — TELEPHONE (OUTPATIENT)
Dept: SURGERY | Age: 70
End: 2020-03-12

## 2020-03-12 VITALS
HEART RATE: 71 BPM | WEIGHT: 202.4 LBS | SYSTOLIC BLOOD PRESSURE: 118 MMHG | HEIGHT: 72 IN | BODY MASS INDEX: 27.41 KG/M2 | DIASTOLIC BLOOD PRESSURE: 70 MMHG

## 2020-03-12 PROCEDURE — 3017F COLORECTAL CA SCREEN DOC REV: CPT | Performed by: SURGERY

## 2020-03-12 PROCEDURE — 99214 OFFICE O/P EST MOD 30 MIN: CPT

## 2020-03-12 PROCEDURE — 99204 OFFICE O/P NEW MOD 45 MIN: CPT | Performed by: SURGERY

## 2020-03-12 PROCEDURE — G8484 FLU IMMUNIZE NO ADMIN: HCPCS | Performed by: SURGERY

## 2020-03-12 PROCEDURE — G8417 CALC BMI ABV UP PARAM F/U: HCPCS | Performed by: SURGERY

## 2020-03-12 PROCEDURE — 1036F TOBACCO NON-USER: CPT | Performed by: SURGERY

## 2020-03-12 PROCEDURE — 1123F ACP DISCUSS/DSCN MKR DOCD: CPT | Performed by: SURGERY

## 2020-03-12 PROCEDURE — G8427 DOCREV CUR MEDS BY ELIG CLIN: HCPCS | Performed by: SURGERY

## 2020-03-12 PROCEDURE — 4040F PNEUMOC VAC/ADMIN/RCVD: CPT | Performed by: SURGERY

## 2020-03-12 NOTE — PROGRESS NOTES
[address] [facility]  [facility] [address]  [address], [address]  [place] [address]  [address] [facility]  1950  77 y.o.male       Chief Complaint:  Chief Complaint   Patient presents with    New Patient     PAD       History of present Illness:  Pt is here today for evaluation and treatment of his peripheral arterial disease. While at [facility] he was told he had calcific vascular disease involving the aorta and iliac arteries bilaterally. I reviewed the CT scan of the abdomen and pelvis without contrast and there is calcification of the aorta and iliac vessels. Renals, SMA and celiac appear normal however this is a noncontrast study. On questioning denies any amaurosis fugax or lateralizing symptoms. He denies any significant claudication. Does complain of leg pain while lying down. He denies any previous strokes. Past Medical History:  has a past medical history of Anemia, Benign prostatic hypertrophy, Cyst in hand, Hyperlipidemia, Hypertension, Keratosis, Overweight(278.02), Seborrhea, and Type II or unspecified type diabetes mellitus without mention of complication, not stated as uncontrolled. Past Surgical History: History reviewed. No pertinent surgical history. Social History:  reports that he quit smoking about 19 years ago. His smoking use included cigarettes. He has a 60.00 pack-year smoking history. He has never used smokeless tobacco. He reports current alcohol use. He reports that he does not use drugs. Family History: family history includes Cancer in his father and paternal uncle; Cancer (age of onset: 64) in his brother; Diabetes in his mother; Stroke in his father.     Review of Systems:   Constitutional:  negative for  fevers, chills, sweats, fatigue, and weight loss  HEENT:  negative for vision or hearing changes,   Respiratory:  negative for shortness

## 2020-03-19 RX ORDER — NATEGLINIDE 60 MG/1
60 TABLET ORAL
Qty: 270 TABLET | Refills: 3 | Status: SHIPPED | OUTPATIENT
Start: 2020-03-19 | End: 2020-04-16 | Stop reason: ALTCHOICE

## 2020-03-19 RX ORDER — METFORMIN HYDROCHLORIDE 500 MG/1
TABLET, EXTENDED RELEASE ORAL
Qty: 360 TABLET | Refills: 3 | Status: SHIPPED | OUTPATIENT
Start: 2020-03-19 | End: 2020-12-14

## 2020-03-19 RX ORDER — BLOOD SUGAR DIAGNOSTIC
STRIP MISCELLANEOUS
Qty: 100 STRIP | Refills: 5 | Status: SHIPPED | OUTPATIENT
Start: 2020-03-19 | End: 2020-07-17 | Stop reason: SDUPTHER

## 2020-03-19 RX ORDER — BLOOD SUGAR DIAGNOSTIC
STRIP MISCELLANEOUS
Qty: 100 STRIP | Refills: 5 | Status: SHIPPED | OUTPATIENT
Start: 2020-03-19 | End: 2020-03-19 | Stop reason: SDUPTHER

## 2020-04-10 ENCOUNTER — HOSPITAL ENCOUNTER (OUTPATIENT)
Dept: LAB | Age: 70
Discharge: HOME | End: 2020-04-10
Payer: MEDICARE

## 2020-04-10 LAB
ESTIMATED AVERAGE GLUCOSE: 123 MG/DL
HBA1C MFR BLD: 5.9 % (ref 4.8–5.9)

## 2020-04-10 PROCEDURE — 83036 HEMOGLOBIN GLYCOSYLATED A1C: CPT

## 2020-04-10 PROCEDURE — 36415 COLL VENOUS BLD VENIPUNCTURE: CPT

## 2020-04-16 ENCOUNTER — VIRTUAL VISIT (OUTPATIENT)
Dept: DIABETES SERVICES | Age: 70
End: 2020-04-16
Payer: MEDICARE

## 2020-04-16 VITALS — WEIGHT: 197 LBS | BODY MASS INDEX: 26.71 KG/M2

## 2020-04-16 PROCEDURE — 99442 PR PHYS/QHP TELEPHONE EVALUATION 11-20 MIN: CPT | Performed by: NURSE PRACTITIONER

## 2020-05-05 ENCOUNTER — TELEPHONE (OUTPATIENT)
Dept: SURGERY | Age: 70
End: 2020-05-05

## 2020-06-04 ENCOUNTER — HOSPITAL ENCOUNTER (OUTPATIENT)
Dept: INTERVENTIONAL RADIOLOGY/VASCULAR | Age: 70
Discharge: HOME | End: 2020-06-06
Payer: MEDICARE

## 2020-06-04 PROCEDURE — 93923 UPR/LXTR ART STDY 3+ LVLS: CPT

## 2020-06-04 PROCEDURE — 93880 EXTRACRANIAL BILAT STUDY: CPT

## 2020-06-29 ENCOUNTER — TELEPHONE (OUTPATIENT)
Dept: INTERNAL MEDICINE | Age: 70
End: 2020-06-29

## 2020-06-29 ENCOUNTER — VIRTUAL VISIT (OUTPATIENT)
Dept: INTERNAL MEDICINE | Age: 70
End: 2020-06-29
Payer: MEDICARE

## 2020-06-29 PROCEDURE — G2012 BRIEF CHECK IN BY MD/QHP: HCPCS | Performed by: NURSE PRACTITIONER

## 2020-06-29 NOTE — TELEPHONE ENCOUNTER
Patient called in this morning requesting a call back from [employer]. Patient would like to discuss his medications and some changes that were made. Tried to get a message from patient he declines and only wants to discuss with you.  He can be reached at [phone]

## 2020-07-06 ENCOUNTER — OFFICE VISIT (OUTPATIENT)
Dept: INTERNAL MEDICINE | Age: 70
End: 2020-07-06
Payer: MEDICARE

## 2020-07-06 VITALS
DIASTOLIC BLOOD PRESSURE: 76 MMHG | HEART RATE: 76 BPM | HEIGHT: 72 IN | BODY MASS INDEX: 26.55 KG/M2 | RESPIRATION RATE: 16 BRPM | WEIGHT: 196 LBS | SYSTOLIC BLOOD PRESSURE: 128 MMHG

## 2020-07-06 PROCEDURE — 3044F HG A1C LEVEL LT 7.0%: CPT | Performed by: INTERNAL MEDICINE

## 2020-07-06 PROCEDURE — 1036F TOBACCO NON-USER: CPT | Performed by: INTERNAL MEDICINE

## 2020-07-06 PROCEDURE — G8417 CALC BMI ABV UP PARAM F/U: HCPCS | Performed by: INTERNAL MEDICINE

## 2020-07-06 PROCEDURE — 1123F ACP DISCUSS/DSCN MKR DOCD: CPT | Performed by: INTERNAL MEDICINE

## 2020-07-06 PROCEDURE — G8427 DOCREV CUR MEDS BY ELIG CLIN: HCPCS | Performed by: INTERNAL MEDICINE

## 2020-07-06 PROCEDURE — 3017F COLORECTAL CA SCREEN DOC REV: CPT | Performed by: INTERNAL MEDICINE

## 2020-07-06 PROCEDURE — 2022F DILAT RTA XM EVC RTNOPTHY: CPT | Performed by: INTERNAL MEDICINE

## 2020-07-06 PROCEDURE — 4040F PNEUMOC VAC/ADMIN/RCVD: CPT | Performed by: INTERNAL MEDICINE

## 2020-07-06 PROCEDURE — 99213 OFFICE O/P EST LOW 20 MIN: CPT

## 2020-07-06 PROCEDURE — 99214 OFFICE O/P EST MOD 30 MIN: CPT | Performed by: INTERNAL MEDICINE

## 2020-07-06 RX ORDER — HYDROCODONE BITARTRATE AND ACETAMINOPHEN 5; 325 MG/1; MG/1
1 TABLET ORAL EVERY 6 HOURS PRN
COMMUNITY
Start: 2020-07-03 | End: 2020-07-06

## 2020-07-06 RX ORDER — CIPROFLOXACIN 500 MG/1
500 TABLET, FILM COATED ORAL 2 TIMES DAILY
COMMUNITY
Start: 2020-07-03 | End: 2020-07-30 | Stop reason: ALTCHOICE

## 2020-07-06 RX ORDER — METRONIDAZOLE 500 MG/1
500 TABLET ORAL 2 TIMES DAILY
COMMUNITY
Start: 2020-07-03 | End: 2020-07-30 | Stop reason: ALTCHOICE

## 2020-07-06 ASSESSMENT — ENCOUNTER SYMPTOMS
EYE PAIN: 0
VOMITING: 0
SHORTNESS OF BREATH: 0
ABDOMINAL PAIN: 1
BACK PAIN: 0
DIARRHEA: 0
CONSTIPATION: 0
BLOOD IN STOOL: 0
NAUSEA: 0
COUGH: 0

## 2020-07-06 NOTE — PROGRESS NOTES
[place] [address]  Dept: [phone]  Dept Fax: [phone]  Loc: [phone]     Helen Conrad is a 72 y.o. male who presents today for his medical conditions/complaintsas noted below. Helen Conrad is c/o of   Chief Complaint   Patient presents with    Diverticulitis     [facility] ER F/U 7/3/2020. Hasnt had a BM since friday, hasnt ate much either. left mid/lower side. Pain is getting better, much more tolerable. Rx given at [address] flagyl, cipro. Started 7/3/2020.  Diabetes    Hypertension         HPI:     Diabetes   He presents for his follow-up diabetic visit. He has type 2 diabetes mellitus. His disease course has been fluctuating. Pertinent negatives for hypoglycemia include no confusion, dizziness, headaches, nervousness/anxiousness or pallor. Pertinent negatives for diabetes include no chest pain, no polydipsia, no polyuria and no weakness. Hypertension   This is a chronic problem. The current episode started more than 1 year ago. The problem has been waxing and waning since onset. The problem is controlled. Pertinent negatives include no chest pain, headaches, neck pain, palpitations or shortness of breath. Abdominal Pain   This is a new problem. The current episode started in the past 7 days. The onset quality is sudden. The problem occurs intermittently. Pertinent negatives include no arthralgias, constipation, diarrhea, dysuria, fever, headaches, hematuria, nausea or vomiting. Other   This is a new (4-diverticulitis) problem. The current episode started in the past 7 days. The problem occurs constantly. The problem has been gradually improving. Associated symptoms include abdominal pain. Pertinent negatives include no arthralgias, chest pain, chills, coughing, fever, headaches, nausea, neck pain, numbness, rash, vomiting or weakness.        Hemoglobin A1C (%) MOUTH TWO TIMES DAILY 360 tablet 3    lisinopril (PRINIVIL;ZESTRIL) 10 MG tablet Take 1 tablet by mouth daily 90 tablet 3    rosuvastatin (CRESTOR) 20 MG tablet TAKE ONE-HALF TABLET BY  MOUTH ONCE DAILY 45 tablet 3    blood glucose test strips (ACCU-CHEK BYRON PLUS) strip TEST TWO TIMES DAILY FOR IRREGULAR BLOOD GLUCOSE 100 strip 5    Blood Glucose Monitoring Suppl (ONE TOUCH ULTRA 2) by Does not apply route. Tests blood sugar once a day       No current facility-administered medications for this visit. No Known Allergies    Health Maintenance   Topic Date Due    DTaP/Tdap/Td vaccine (1 - Tdap) 06/23/1970    Shingles Vaccine (1 of 2) 06/23/2001    Colon cancer screen colonoscopy  06/23/2001    Diabetic microalbuminuria test  07/16/2020    Lipid screen  07/16/2020    Potassium monitoring  07/16/2020    Creatinine monitoring  07/16/2020    Pneumococcal 65+ years Vaccine (1 of 1 - PPSV23) 07/23/2020 (Originally 6/23/2016)    Hepatitis C screen  07/23/2020 (Originally 1950)    Flu vaccine (1) 09/01/2020    Diabetic foot exam  01/27/2021    Annual Wellness Visit (AWV)  01/27/2021    A1C test (Diabetic or Prediabetic)  04/10/2021    Diabetic retinal exam  01/09/2022    AAA screen  Completed    Hepatitis A vaccine  Aged Out    Hib vaccine  Aged Out    Meningococcal (ACWY) vaccine  Aged Out       Subjective:      Review of Systems   Constitutional: Negative for chills and fever. HENT: Negative for hearing loss. Eyes: Negative for pain and visual disturbance. Respiratory: Negative for cough and shortness of breath. Cardiovascular: Negative for chest pain, palpitations and leg swelling. Gastrointestinal: Positive for abdominal pain. Negative for blood in stool, constipation, diarrhea, nausea and vomiting. Endocrine: Negative for cold intolerance, polydipsia and polyuria. Genitourinary: Negative for difficulty urinating, dysuria and hematuria.    Musculoskeletal: Negative for arthralgias, back pain, gait problem and neck pain. Skin: Negative for pallor and rash. Neurological: Negative for dizziness, weakness, numbness and headaches. Hematological: Negative for adenopathy. Does not bruise/bleed easily. Psychiatric/Behavioral: Negative for confusion. The patient is not nervous/anxious. Objective:     Physical Exam  Vitals signs reviewed. Constitutional:       Appearance: He is well-developed. HENT:      Head: Normocephalic and atraumatic. Eyes:      Pupils: Pupils are equal, round, and reactive to light. Neck:      Musculoskeletal: Neck supple. Cardiovascular:      Rate and Rhythm: Normal rate and regular rhythm. Heart sounds: No murmur. No friction rub. No gallop. Pulmonary:      Effort: Pulmonary effort is normal.      Breath sounds: Normal breath sounds. No wheezing or rales. Abdominal:      General: There is no distension. Palpations: Abdomen is soft. There is no mass. Tenderness: There is no abdominal tenderness. There is no rebound. Musculoskeletal: Normal range of motion. Lymphadenopathy:      Cervical: No cervical adenopathy. Skin:     General: Skin is warm and dry. Findings: No rash. Neurological:      Mental Status: He is alert and oriented to person, place, and time. Cranial Nerves: No cranial nerve deficit (grossly). Psychiatric:         Thought Content: Thought content normal.        /76 (Site: Right Upper Arm, Position: Sitting, Cuff Size: Medium Adult)   Pulse 76   Resp 16   Ht 6' (1.829 m)   Wt 196 lb (88.9 kg)   BMI 26.58 kg/m²     Assessment:       Diagnosis Orders   1. Lower abdominal pain  [facility] General Surgery, [place]   2. Essential hypertension     3. Type 2 diabetes mellitus without complication, without long-term current use of insulin ([address]     4. Diverticulitis  [facility] General Surgery, [place]   5.  Small bowel ischemia [facility])  [address] Surgery, [place]      Patient had abdominal pain and went to [facility] [address] ER on 7/3/2020. CT showed diverticulosis. Abdomen CTA showed that superior and inferior mesenteric arteries were okay. However there was possible mid jejunal small bowel ischemia with pneumatosis. Patient is continuing on Cipro and Flagyl and is improving. However we will refer patient for general surgery consult to review the CT and CTA to consider both colonoscopy and possible surgery and follow the possible jejunal ischemia and give recommendations. Patient was told and knows that if his abdominal pain becomes worse again that he needs to go back to the emergency room immediately. Plan:       Return in about 1 month (around 8/6/2020) for Hypertension, Diabetes, abdominal pain. Orders Placed This Encounter   Procedures   [facility] General Surgery, [place]     Referral Priority:   Routine     Referral Type:   Eval and Treat     Referral Reason:   Specialty Services Required     Requested Specialty:   General Surgery     Number of Visits Requested:   1     No orders of the defined types were placed in this encounter. Patientgiven educational materials - see patient instructions. Discussed use, benefit,and side effects of prescribed medications. All patient questions answered. Ptvoiced understanding. Reviewed health maintenance. Instructed to continue currentmedications, diet and exercise. Patient agreed with treatment plan. Follow up asdirected.      Electronically signed by Tej Madrigal MD on 7/6/2020 at 9:46 AM

## 2020-07-17 RX ORDER — BLOOD SUGAR DIAGNOSTIC
STRIP MISCELLANEOUS
Qty: 100 STRIP | Refills: 5 | Status: SHIPPED | OUTPATIENT
Start: 2020-07-17 | End: 2021-05-06 | Stop reason: SDUPTHER

## 2020-07-23 ENCOUNTER — HOSPITAL ENCOUNTER (OUTPATIENT)
Dept: LAB | Age: 70
Discharge: HOME | End: 2020-07-23
Payer: MEDICARE

## 2020-07-23 LAB
ABSOLUTE EOS #: 0.21 K/UL (ref 0–0.44)
ABSOLUTE IMMATURE GRANULOCYTE: 0.04 K/UL (ref 0–0.3)
ABSOLUTE LYMPH #: 1.6 K/UL (ref 1.1–3.7)
ABSOLUTE MONO #: 0.66 K/UL (ref 0.1–1.2)
ALBUMIN SERPL-MCNC: 4.2 G/DL (ref 3.5–5.2)
ALBUMIN/GLOBULIN RATIO: 1.7 (ref 1–2.5)
ALP BLD-CCNC: 71 U/L (ref 40–129)
ALT SERPL-CCNC: 18 U/L (ref 5–41)
ANION GAP SERPL CALCULATED.3IONS-SCNC: 11 MMOL/L (ref 9–17)
AST SERPL-CCNC: 12 U/L
BASOPHILS # BLD: 1 % (ref 0–2)
BASOPHILS ABSOLUTE: 0.05 K/UL (ref 0–0.2)
BILIRUB SERPL-MCNC: 0.29 MG/DL (ref 0.3–1.2)
BUN BLDV-MCNC: 20 MG/DL (ref 8–23)
BUN/CREAT BLD: 23 (ref 9–20)
CALCIUM SERPL-MCNC: 9.4 MG/DL (ref 8.6–10.4)
CHLORIDE BLD-SCNC: 99 MMOL/L (ref 98–107)
CHOLESTEROL/HDL RATIO: 3.4
CHOLESTEROL: 99 MG/DL
CO2: 28 MMOL/L (ref 20–31)
CREAT SERPL-MCNC: 0.87 MG/DL (ref 0.7–1.2)
CREATININE URINE: 93.8 MG/DL (ref 39–259)
DIFFERENTIAL TYPE: ABNORMAL
EOSINOPHILS RELATIVE PERCENT: 2 % (ref 1–4)
ESTIMATED AVERAGE GLUCOSE: 140 MG/DL
GFR AFRICAN AMERICAN: >60 ML/MIN
GFR NON-AFRICAN AMERICAN: >60 ML/MIN
GFR SERPL CREATININE-BSD FRML MDRD: ABNORMAL ML/MIN/{1.73_M2}
GFR SERPL CREATININE-BSD FRML MDRD: ABNORMAL ML/MIN/{1.73_M2}
GLUCOSE BLD-MCNC: 135 MG/DL (ref 70–99)
HBA1C MFR BLD: 6.5 % (ref 4.8–5.9)
HCT VFR BLD CALC: 38.1 % (ref 40.7–50.3)
HDLC SERPL-MCNC: 29 MG/DL
HEMOGLOBIN: 12.2 G/DL (ref 13–17)
IMMATURE GRANULOCYTES: 0 %
LDL CHOLESTEROL: 52 MG/DL (ref 0–130)
LYMPHOCYTES # BLD: 16 % (ref 24–43)
MCH RBC QN AUTO: 29.8 PG (ref 25.2–33.5)
MCHC RBC AUTO-ENTMCNC: 32 G/DL (ref 25.2–33.5)
MCV RBC AUTO: 92.9 FL (ref 82.6–102.9)
MICROALBUMIN/CREAT 24H UR: <12 MG/L
MICROALBUMIN/CREAT UR-RTO: NORMAL MCG/MG CREAT
MONOCYTES # BLD: 7 % (ref 3–12)
NRBC AUTOMATED: 0 PER 100 WBC
PDW BLD-RTO: 12.9 % (ref 11.8–14.4)
PLATELET # BLD: 312 K/UL (ref 138–453)
PLATELET ESTIMATE: ABNORMAL
PMV BLD AUTO: 9.8 FL (ref 8.1–13.5)
POTASSIUM SERPL-SCNC: 4.8 MMOL/L (ref 3.7–5.3)
PROSTATE SPECIFIC ANTIGEN: 1.36 UG/L
RBC # BLD: 4.1 M/UL (ref 4.21–5.77)
RBC # BLD: ABNORMAL 10*6/UL
SEG NEUTROPHILS: 74 % (ref 36–65)
SEGMENTED NEUTROPHILS ABSOLUTE COUNT: 7.46 K/UL (ref 1.5–8.1)
SODIUM BLD-SCNC: 138 MMOL/L (ref 135–144)
TOTAL PROTEIN: 6.7 G/DL (ref 6.4–8.3)
TRIGL SERPL-MCNC: 91 MG/DL
VLDLC SERPL CALC-MCNC: ABNORMAL MG/DL (ref 1–30)
WBC # BLD: 10 K/UL (ref 3.5–11.3)
WBC # BLD: ABNORMAL 10*3/UL

## 2020-07-23 PROCEDURE — 85025 COMPLETE CBC W/AUTO DIFF WBC: CPT

## 2020-07-23 PROCEDURE — G0103 PSA SCREENING: HCPCS

## 2020-07-23 PROCEDURE — 36415 COLL VENOUS BLD VENIPUNCTURE: CPT

## 2020-07-23 PROCEDURE — 82570 ASSAY OF URINE CREATININE: CPT

## 2020-07-23 PROCEDURE — 80053 COMPREHEN METABOLIC PANEL: CPT

## 2020-07-23 PROCEDURE — 82043 UR ALBUMIN QUANTITATIVE: CPT

## 2020-07-23 PROCEDURE — 83036 HEMOGLOBIN GLYCOSYLATED A1C: CPT

## 2020-07-23 PROCEDURE — 80061 LIPID PANEL: CPT

## 2020-07-30 ENCOUNTER — OFFICE VISIT (OUTPATIENT)
Dept: INTERNAL MEDICINE | Age: 70
End: 2020-07-30
Payer: MEDICARE

## 2020-07-30 ENCOUNTER — TELEPHONE (OUTPATIENT)
Dept: INTERNAL MEDICINE | Age: 70
End: 2020-07-30

## 2020-07-30 VITALS
RESPIRATION RATE: 16 BRPM | SYSTOLIC BLOOD PRESSURE: 130 MMHG | DIASTOLIC BLOOD PRESSURE: 72 MMHG | WEIGHT: 193 LBS | BODY MASS INDEX: 26.14 KG/M2 | HEIGHT: 72 IN | HEART RATE: 68 BPM

## 2020-07-30 PROCEDURE — 3017F COLORECTAL CA SCREEN DOC REV: CPT | Performed by: INTERNAL MEDICINE

## 2020-07-30 PROCEDURE — 1123F ACP DISCUSS/DSCN MKR DOCD: CPT | Performed by: INTERNAL MEDICINE

## 2020-07-30 PROCEDURE — G8417 CALC BMI ABV UP PARAM F/U: HCPCS | Performed by: INTERNAL MEDICINE

## 2020-07-30 PROCEDURE — 99214 OFFICE O/P EST MOD 30 MIN: CPT | Performed by: INTERNAL MEDICINE

## 2020-07-30 PROCEDURE — 4040F PNEUMOC VAC/ADMIN/RCVD: CPT | Performed by: INTERNAL MEDICINE

## 2020-07-30 PROCEDURE — 2022F DILAT RTA XM EVC RTNOPTHY: CPT | Performed by: INTERNAL MEDICINE

## 2020-07-30 PROCEDURE — 99212 OFFICE O/P EST SF 10 MIN: CPT | Performed by: INTERNAL MEDICINE

## 2020-07-30 PROCEDURE — 3044F HG A1C LEVEL LT 7.0%: CPT | Performed by: INTERNAL MEDICINE

## 2020-07-30 PROCEDURE — 1036F TOBACCO NON-USER: CPT | Performed by: INTERNAL MEDICINE

## 2020-07-30 PROCEDURE — G8427 DOCREV CUR MEDS BY ELIG CLIN: HCPCS | Performed by: INTERNAL MEDICINE

## 2020-07-30 RX ORDER — ROSUVASTATIN CALCIUM 10 MG/1
10 TABLET, COATED ORAL DAILY
Qty: 90 TABLET | Refills: 3 | Status: SHIPPED | OUTPATIENT
Start: 2020-07-30 | End: 2021-07-29 | Stop reason: SDUPTHER

## 2020-07-30 ASSESSMENT — PATIENT HEALTH QUESTIONNAIRE - PHQ9
SUM OF ALL RESPONSES TO PHQ9 QUESTIONS 1 & 2: 0
1. LITTLE INTEREST OR PLEASURE IN DOING THINGS: 0
SUM OF ALL RESPONSES TO PHQ QUESTIONS 1-9: 0
2. FEELING DOWN, DEPRESSED OR HOPELESS: 0
SUM OF ALL RESPONSES TO PHQ QUESTIONS 1-9: 0

## 2020-07-30 ASSESSMENT — ENCOUNTER SYMPTOMS
VOMITING: 0
BACK PAIN: 0
COUGH: 0
CONSTIPATION: 0
DIARRHEA: 0
BLOOD IN STOOL: 0
EYE PAIN: 0
SHORTNESS OF BREATH: 0
NAUSEA: 0
ABDOMINAL PAIN: 0

## 2020-07-30 NOTE — PROGRESS NOTES
[place] [id number]  Dept: [phone]  Dept Fax: [phone]  Loc: [phone]     Linda Flores is a 72 y.o. male who presents today for his medical conditions/complaintsas noted below. Linda Flores is c/o of   Chief Complaint   Patient presents with    Diabetes     6 month appt    Hypertension    Hyperlipidemia         HPI:     Diabetes   He presents for his follow-up diabetic visit. He has type 2 (Without complication and without insulin) diabetes mellitus. His disease course has been fluctuating. Pertinent negatives for hypoglycemia include no confusion, dizziness, headaches, nervousness/anxiousness or pallor. Pertinent negatives for diabetes include no chest pain, no polydipsia, no polyuria and no weakness. Hypertension   This is a chronic problem. The current episode started more than 1 year ago. The problem has been waxing and waning since onset. The problem is controlled. Pertinent negatives include no chest pain, headaches, neck pain, palpitations or shortness of breath. Hyperlipidemia   This is a chronic problem. The current episode started more than 1 year ago. The problem is controlled. Recent lipid tests were reviewed and are variable. Pertinent negatives include no chest pain or shortness of breath. Hemoglobin A1C (%)   Date Value   07/23/2020 6.5 (H)   04/10/2020 5.9   11/27/2019 6.0 (H)            Microalb/Crt.  Ratio (mcg/mg creat)   Date Value   07/23/2020 CANNOT BE CALCULATED     LDL Cholesterol (mg/dL)   Date Value   07/23/2020 52   07/16/2019 51   07/13/2018 38         AST (U/L)   Date Value   07/23/2020 12     ALT (U/L)   Date Value   07/23/2020 18     BUN (mg/dL)   Date Value   07/23/2020 20     BP Readings from Last 3 Encounters:   07/30/20 130/72   07/06/20 128/76   03/12/20 118/70              Past Medical History:   Diagnosis Date    Anemia minimal anemia, hemoglobin 13.6    Benign prostatic hypertrophy     Cyst in hand     distal phalanx, mid finger left hand    Hyperlipidemia     Hypertension     Keratosis     right forehead    Overweight(278.02)     Seborrhea     Type II or unspecified type diabetes mellitus without mention of complication, not stated as uncontrolled       History reviewed. No pertinent surgical history. Family History   Problem Relation Age of Onset    Diabetes Mother     Cancer Father         throat cancer    Stroke Father     Cancer Brother 64        pancreatic    Cancer Paternal Uncle         lung          Social History     Tobacco Use    Smoking status: Former Smoker     Packs/day: 3.00     Years: 20.00     Pack years: 60.00     Types: Cigarettes     Last attempt to quit: 2000     Years since quittin.6    Smokeless tobacco: Never Used   Substance Use Topics    Alcohol use: Yes     Comment: no alcohol ingestion after a heavier intake eearlier in his life         Current Outpatient Medications   Medication Sig Dispense Refill    rosuvastatin (CRESTOR) 10 MG tablet Take 1 tablet by mouth daily 90 tablet 3    metFORMIN (GLUCOPHAGE-XR) 500 MG extended release tablet TAKE 2 TABLETS BY MOUTH TWO TIMES DAILY 360 tablet 3    lisinopril (PRINIVIL;ZESTRIL) 10 MG tablet Take 1 tablet by mouth daily 90 tablet 3    blood glucose test strips (ACCU-CHEK BYRON PLUS) strip TEST TWO TIMES DAILY FOR IRREGULAR BLOOD GLUCOSE 100 strip 5    Blood Glucose Monitoring Suppl (ONE TOUCH ULTRA 2) by Does not apply route. Tests blood sugar once a day       No current facility-administered medications for this visit.       No Known Allergies    Health Maintenance   Topic Date Due    Hepatitis C screen  1950    DTaP/Tdap/Td vaccine (1 - Tdap) 1970    Shingles Vaccine (1 of 2) 2001    Colon cancer screen colonoscopy  2001    Pneumococcal 65+ years Vaccine (1 of 1 - PPSV23) 2016    Flu vaccine (1) 09/01/2020    Diabetic foot exam  01/27/2021    Annual Wellness Visit (AWV)  01/27/2021    A1C test (Diabetic or Prediabetic)  07/23/2021    Diabetic microalbuminuria test  07/23/2021    Lipid screen  07/23/2021    Potassium monitoring  07/23/2021    Creatinine monitoring  07/23/2021    Diabetic retinal exam  01/09/2022    AAA screen  Completed    Hepatitis A vaccine  Aged Out    Hib vaccine  Aged Out    Meningococcal (ACWY) vaccine  Aged Out       Subjective:      Review of Systems   Constitutional: Negative for chills and fever. HENT: Negative for hearing loss. Eyes: Negative for pain and visual disturbance. Respiratory: Negative for cough and shortness of breath. Cardiovascular: Negative for chest pain, palpitations and leg swelling. Gastrointestinal: Negative for abdominal pain, blood in stool, constipation, diarrhea, nausea and vomiting. Endocrine: Negative for cold intolerance, polydipsia and polyuria. Genitourinary: Negative for difficulty urinating, dysuria and hematuria. Musculoskeletal: Negative for arthralgias, back pain, gait problem and neck pain. Skin: Negative for pallor and rash. Neurological: Negative for dizziness, weakness, numbness and headaches. Hematological: Negative for adenopathy. Does not bruise/bleed easily. Psychiatric/Behavioral: Negative for confusion. The patient is not nervous/anxious. Objective:     Physical Exam  Vitals signs reviewed. Constitutional:       Appearance: He is well-developed. HENT:      Head: Normocephalic and atraumatic. Eyes:      Pupils: Pupils are equal, round, and reactive to light. Neck:      Musculoskeletal: Neck supple. Cardiovascular:      Rate and Rhythm: Normal rate and regular rhythm. Heart sounds: No murmur. No friction rub. No gallop. Pulmonary:      Effort: Pulmonary effort is normal.      Breath sounds: Normal breath sounds. No wheezing or rales.    Abdominal:      General: There maintenance. Instructed to continue currentmedications, diet and exercise. Patient agreed with treatment plan. Follow up asdirected.      Electronically signed by Erin Dobson MD on 7/30/2020 at 9:38 AM

## 2020-08-20 ENCOUNTER — INITIAL CONSULT (OUTPATIENT)
Dept: SURGERY | Age: 70
End: 2020-08-20
Payer: MEDICARE

## 2020-08-20 ENCOUNTER — TELEPHONE (OUTPATIENT)
Dept: SURGERY | Age: 70
End: 2020-08-20

## 2020-08-20 VITALS
RESPIRATION RATE: 18 BRPM | TEMPERATURE: 97.6 F | DIASTOLIC BLOOD PRESSURE: 78 MMHG | SYSTOLIC BLOOD PRESSURE: 132 MMHG | BODY MASS INDEX: 25.96 KG/M2 | HEART RATE: 60 BPM | WEIGHT: 191.4 LBS

## 2020-08-20 PROCEDURE — 99215 OFFICE O/P EST HI 40 MIN: CPT | Performed by: SURGERY

## 2020-08-20 PROCEDURE — 4040F PNEUMOC VAC/ADMIN/RCVD: CPT | Performed by: SURGERY

## 2020-08-20 PROCEDURE — 99215 OFFICE O/P EST HI 40 MIN: CPT

## 2020-08-20 PROCEDURE — 99214 OFFICE O/P EST MOD 30 MIN: CPT | Performed by: SURGERY

## 2020-08-20 PROCEDURE — 3017F COLORECTAL CA SCREEN DOC REV: CPT | Performed by: SURGERY

## 2020-08-20 PROCEDURE — 1036F TOBACCO NON-USER: CPT | Performed by: SURGERY

## 2020-08-20 PROCEDURE — G8427 DOCREV CUR MEDS BY ELIG CLIN: HCPCS | Performed by: SURGERY

## 2020-08-20 PROCEDURE — G8417 CALC BMI ABV UP PARAM F/U: HCPCS | Performed by: SURGERY

## 2020-08-20 PROCEDURE — 1123F ACP DISCUSS/DSCN MKR DOCD: CPT | Performed by: SURGERY

## 2020-08-20 RX ORDER — BISACODYL 5 MG
TABLET, DELAYED RELEASE (ENTERIC COATED) ORAL
Qty: 2 TABLET | Refills: 0 | Status: SHIPPED | OUTPATIENT
Start: 2020-08-20 | End: 2020-08-20

## 2020-08-20 RX ORDER — POLYETHYLENE GLYCOL 3350 17 G/17G
POWDER, FOR SOLUTION ORAL
Qty: 255 G | Refills: 0 | Status: SHIPPED | OUTPATIENT
Start: 2020-08-20 | End: 2020-10-01 | Stop reason: ALTCHOICE

## 2020-08-20 RX ORDER — BISACODYL 5 MG
TABLET, DELAYED RELEASE (ENTERIC COATED) ORAL
Qty: 4 TABLET | Refills: 0 | Status: SHIPPED | OUTPATIENT
Start: 2020-08-20 | End: 2020-10-01 | Stop reason: ALTCHOICE

## 2020-08-20 NOTE — TELEPHONE ENCOUNTER
[address] [facility],[address]           [place]           Surgical/Procedure Planned: Colonoscopy    Date & Location: [facility] 2020       Outpatient   Planned Length of OR: 30 minutes    Sedation: intravenous sedation      Estimated Cardiac Risk for Non-Cardiac Surgery/Procedure     Low______ Moderate______ High______    Medication Instructions - Clarification needed by this date:     Metformin Hold ___ Days      Provider: Dr. Luis Miguel Hong of Provider Giving Orders for Medication holds:    _____________________________________________

## 2020-08-20 NOTE — PROGRESS NOTES
Instructions given and reviewed with the patient and answered all questions. Patient denies any further questions at this time. Patient scheduled for colonoscopy on 9/14/2020. Ordered bowel prep via verbal order per Dr. Radha Laughlin.

## 2020-08-20 NOTE — PROGRESS NOTES
General Surgery History & Physical  Dianne Self MD  Pt Name: Favian Phan  MRN: [id number]  [place]: 1950  Date of evaluation: 8/20/2020  Primary Care Physician: Marla Hair MD    Patient evaluated at the request of Dr. Lord Marin  Reason for evaluation: diverticulitis, abdominal pain       SUBJECTIVE:  Chief Complaint:   Chief Complaint   Patient presents with    Abdominal Pain    Diverticulitis     History of Present Illness:         Patient presents  Abd pain: yes, previously  Anemia: no  Bloating:no  Diarrhea: no  Constipation: no  Melena: no  Hematochezia:no  Rectal Bleeding:no  Rectal/Anal Pain:no  Pruritus: no  Family history colon Cancer: no  Previous colon cancer: no  Previous Colon Polyp: no  Change in bowels: no  Decrease caliber of stool: no  Change in color of stool: no    Previous work up date: No previous colonoscopy    Patient is a 56-year-old male who is here after several episodes of diverticulitis. He had a bad episode in September 2019. He went to the hospital and was hospitalized for several days. The CT showed diverticulitis with pneumo intestinalis in the small bowel. He was treated conservatively and resolved. He had another episode about 2 months ago but it is been mild. He did not get hospitalized he just took outpatient antibiotics. Today he has no pain. He is never had a colonoscopy because he is very scared to do the prep. He has no family history of colon cancer. Past Medical History   has a past medical history of Anemia, Benign prostatic hypertrophy, Cyst in hand, Hyperlipidemia, Hypertension, Keratosis, Overweight(278.02), Seborrhea, and Type II or unspecified type diabetes mellitus without mention of complication, not stated as uncontrolled. Past Surgical History   has no past surgical history on file. Family History  family history includes Cancer in his father and paternal uncle;  Cancer (age of onset: 64) in his brother; Diabetes in his mother; Stroke in his father. Social History  Tobacco use:  reports that he quit smoking about 19 years ago. His smoking use included cigarettes. He has a 60.00 pack-year smoking history. He has never used smokeless tobacco.  Alcohol use:  reports current alcohol use. Drug use:  reports no history of drug use. Medications  Current Medications:   Current Outpatient Medications   Medication Sig Dispense Refill    rosuvastatin (CRESTOR) 10 MG tablet Take 1 tablet by mouth daily 90 tablet 3    blood glucose test strips (ACCU-CHEK BYRON PLUS) strip TEST TWO TIMES DAILY FOR IRREGULAR BLOOD GLUCOSE 100 strip 5    metFORMIN (GLUCOPHAGE-XR) 500 MG extended release tablet TAKE 2 TABLETS BY MOUTH TWO TIMES DAILY 360 tablet 3    lisinopril (PRINIVIL;ZESTRIL) 10 MG tablet Take 1 tablet by mouth daily 90 tablet 3    Blood Glucose Monitoring Suppl (ONE TOUCH ULTRA 2) by Does not apply route. Tests blood sugar once a day       No current facility-administered medications for this visit. Home Medications:   Prior to Admission medications    Medication Sig Start Date End Date Taking? Authorizing Provider   rosuvastatin (CRESTOR) 10 MG tablet Take 1 tablet by mouth daily 7/30/20  Yes Deandre Zaragoza MD   blood glucose test strips (ACCU-CHEK BYRON PLUS) strip TEST TWO TIMES DAILY FOR IRREGULAR BLOOD GLUCOSE 7/17/20  Yes Deandre Zaragoza MD   metFORMIN (GLUCOPHAGE-XR) 500 MG extended release tablet TAKE 2 TABLETS BY MOUTH TWO TIMES DAILY 3/19/20  Yes Deandre Zaragoza MD   lisinopril (PRINIVIL;ZESTRIL) 10 MG tablet Take 1 tablet by mouth daily 3/4/20  Yes Deandre Zaragoza MD   Blood Glucose Monitoring Suppl (ONE TOUCH ULTRA 2) by Does not apply route. Tests blood sugar once a day   Yes Historical Provider, MD       Allergies  Patient has no known allergies. Review of Systems:  General: Denies any fever, chills. HEENT: Denies any diplopia, tinnitus or vertigo.   Respiratory: Denies any shortness of breath or cough. Cardiac: Denies any chest pain, palpitations, claudication or edema. Gastrointestinal: Denies any melena, hematochezia, hematemesis or pyrosis. Genitourinary: Denies any frequency, urgency, hesitancy or incontinence. Hematologic: Denies bruising or bleeding easily. Endocrine: Denies any history of diabetes or thyroid disease. PHYSICAL EXAMINATION  Vitals:   Vitals:    08/20/20 1026   BP: 132/78   Pulse: 60   Resp: 18   Temp: 97.6 °F (36.4 °C)     General Appearance:  awake, alert, oriented, in no acute distress, well developed, well nourished and in no acute distress  Skin:  Skin color, texture, turgor normal. No rashes or lesions. Head/face:  NCAT  Eyes:  No gross abnormalities. , PERRL, Pupils- PERRL. Ears:  canals and TMs NI and External- normal  Nose/Sinuses:  Nares normal. Septum midline. Mucosa normal. No drainage or sinus tenderness. Mouth/Throat:  Mucosa moist.  No lesions. Pharynx without erythema, edema or exudate. Lungs:  Normal expansion. Clear to auscultation. No rales, rhonchi, or wheezing., Breathing Pattern: regular, no distress, Breath sounds: normal  Heart:  Heart sounds are normal.  Regular rate and rhythm without murmur, gallop or rub. Auscultation: Normal S1 and S2.  Regular rhythm. No murmurs, gallops, or rubs. Abdomen:  Soft, non-tender, normal bowel sounds. No bruits, organomegaly or masses.   Musculoskeletal:  negative, negative findings: no erythema, induration, or nodules, ROM of all joints is normal, strength normal  Neurologic:  negative findings: proximal muscle strength normal, speech normal, mental status intact, cranial nerves 2-12 intact, muscle tone normal, muscle strength normal    LABS:  CBC   Lab Results   Component Value Date    WBC 10.0 07/23/2020    HGB 12.2 07/23/2020    HCT 38.1 07/23/2020     07/23/2020     BMP   Lab Results   Component Value Date     07/23/2020    K 4.8 07/23/2020    CL 99 07/23/2020    CO2 28 07/23/2020    BUN 20 07/23/2020    CREATININE 0.87 07/23/2020     LFT's:   Lab Results   Component Value Date    AST 12 07/23/2020    ALT 18 07/23/2020    ALKPHOS 71 07/23/2020    BILITOT 0.29 07/23/2020     COAGS: No results found for: INR, PTT  Lipids:   Lab Results   Component Value Date    CHOL 99 07/23/2020    HDL 29 07/23/2020    LDLCHOLESTEROL 52 07/23/2020    CHOLHDLRATIO 3.4 07/23/2020    TRIG 91 07/23/2020    VLDL NOT REPORTED 07/23/2020       RADIOLOGY:  All images reviewed and within normal limits unless otherwise specified: Yes    IMPRESSIONS:   Diagnosis Orders   1. Diverticulitis       Patient with 1 severe episode 6 months ago with hospitalization with questionable microperforation or pneumo intestinalis. More mild episode 1 to 2 months ago. Asymptomatic at this time and no previous colonoscopy. We discussed the need for colonoscopy. We also discussed the option of possible colectomy versus continued observation. We will await the colonoscopy results and see how he is doing in several weeks to a month and then discuss observation versus surgical definitive care. Surgical Risk: moderate risk    PLAN:  1. CS    Medical Decision Making: moderate complexity    Thank you for the interesting evaluation. Further recommendations to follow.     Boo Guajardo MD  Electronically signed 8/20/2020 at 11:00 AM

## 2020-08-21 NOTE — TELEPHONE ENCOUNTER
Notified patient and informed him that Dr. Lord Marin would like him to hold his Metformin the day of the procedure and hold aspirin and NSAIDs 7 days before the procedure. Patient verbalized understanding and denies any questions at this time.

## 2020-09-01 ENCOUNTER — TELEPHONE (OUTPATIENT)
Dept: PREADMISSION TESTING | Age: 70
End: 2020-09-01

## 2020-09-01 NOTE — TELEPHONE ENCOUNTER
Spoke with patient.  Patient will be out of town on 9/9/2020 and earliest he can come in will be 9/10/2020 at 8:45 for PAT testing

## 2020-09-10 ENCOUNTER — HOSPITAL ENCOUNTER (OUTPATIENT)
Dept: PREADMISSION TESTING | Age: 70
Setting detail: SPECIMEN
Discharge: HOME | End: 2020-09-14
Payer: MEDICARE

## 2020-09-10 PROCEDURE — U0003 INFECTIOUS AGENT DETECTION BY NUCLEIC ACID (DNA OR RNA); SEVERE ACUTE RESPIRATORY SYNDROME CORONAVIRUS 2 (SARS-COV-2) (CORONAVIRUS DISEASE [COVID-19]), AMPLIFIED PROBE TECHNIQUE, MAKING USE OF HIGH THROUGHPUT TECHNOLOGIES AS DESCRIBED BY CMS-2020-01-R: HCPCS

## 2020-09-11 LAB — SARS-COV-2, NAA: NOT DETECTED

## 2020-09-12 ENCOUNTER — TELEPHONE (OUTPATIENT)
Dept: PRIMARY CARE CLINIC | Age: 70
End: 2020-09-12

## 2020-09-14 ENCOUNTER — ANESTHESIA (OUTPATIENT)
Dept: OPERATING ROOM | Age: 70
End: 2020-09-14
Payer: MEDICARE

## 2020-09-14 ENCOUNTER — ANESTHESIA EVENT (OUTPATIENT)
Dept: OPERATING ROOM | Age: 70
End: 2020-09-14
Payer: MEDICARE

## 2020-09-14 ENCOUNTER — HOSPITAL ENCOUNTER (OUTPATIENT)
Age: 70
Setting detail: OUTPATIENT SURGERY
Discharge: HOME | End: 2020-09-14
Attending: SURGERY | Admitting: SURGERY
Payer: MEDICARE

## 2020-09-14 VITALS — SYSTOLIC BLOOD PRESSURE: 116 MMHG | OXYGEN SATURATION: 95 % | DIASTOLIC BLOOD PRESSURE: 64 MMHG

## 2020-09-14 VITALS
RESPIRATION RATE: 16 BRPM | BODY MASS INDEX: 25.72 KG/M2 | WEIGHT: 189.9 LBS | HEART RATE: 63 BPM | OXYGEN SATURATION: 98 % | HEIGHT: 72 IN | TEMPERATURE: 98.1 F | SYSTOLIC BLOOD PRESSURE: 116 MMHG | DIASTOLIC BLOOD PRESSURE: 71 MMHG

## 2020-09-14 LAB — GLUCOSE BLD-MCNC: 93 MG/DL (ref 75–110)

## 2020-09-14 PROCEDURE — 6360000002 HC RX W HCPCS: Performed by: NURSE ANESTHETIST, CERTIFIED REGISTERED

## 2020-09-14 PROCEDURE — 82947 ASSAY GLUCOSE BLOOD QUANT: CPT

## 2020-09-14 PROCEDURE — 45384 COLONOSCOPY W/LESION REMOVAL: CPT | Performed by: SURGERY

## 2020-09-14 PROCEDURE — 3700000000 HC ANESTHESIA ATTENDED CARE: Performed by: SURGERY

## 2020-09-14 PROCEDURE — 2500000003 HC RX 250 WO HCPCS: Performed by: NURSE ANESTHETIST, CERTIFIED REGISTERED

## 2020-09-14 PROCEDURE — 2580000003 HC RX 258: Performed by: SURGERY

## 2020-09-14 PROCEDURE — 2709999900 HC NON-CHARGEABLE SUPPLY: Performed by: SURGERY

## 2020-09-14 PROCEDURE — 7100000010 HC PHASE II RECOVERY - FIRST 15 MIN: Performed by: SURGERY

## 2020-09-14 PROCEDURE — 3609009300 HC COLONOSCOPY BIOPSY/STOMA: Performed by: SURGERY

## 2020-09-14 PROCEDURE — 45380 COLONOSCOPY AND BIOPSY: CPT | Performed by: SURGERY

## 2020-09-14 PROCEDURE — 7100000011 HC PHASE II RECOVERY - ADDTL 15 MIN: Performed by: SURGERY

## 2020-09-14 PROCEDURE — 88305 TISSUE EXAM BY PATHOLOGIST: CPT

## 2020-09-14 PROCEDURE — 3700000001 HC ADD 15 MINUTES (ANESTHESIA): Performed by: SURGERY

## 2020-09-14 RX ORDER — LIDOCAINE HYDROCHLORIDE 40 MG/ML
INJECTION, SOLUTION RETROBULBAR; TOPICAL PRN
Status: DISCONTINUED | OUTPATIENT
Start: 2020-09-14 | End: 2020-09-14 | Stop reason: SDUPTHER

## 2020-09-14 RX ORDER — SODIUM CHLORIDE, SODIUM LACTATE, POTASSIUM CHLORIDE, CALCIUM CHLORIDE 600; 310; 30; 20 MG/100ML; MG/100ML; MG/100ML; MG/100ML
INJECTION, SOLUTION INTRAVENOUS CONTINUOUS
Status: DISCONTINUED | OUTPATIENT
Start: 2020-09-14 | End: 2020-09-14 | Stop reason: SDUPTHER

## 2020-09-14 RX ORDER — PROPOFOL 10 MG/ML
INJECTION, EMULSION INTRAVENOUS PRN
Status: DISCONTINUED | OUTPATIENT
Start: 2020-09-14 | End: 2020-09-14 | Stop reason: SDUPTHER

## 2020-09-14 RX ORDER — SODIUM CHLORIDE, SODIUM LACTATE, POTASSIUM CHLORIDE, CALCIUM CHLORIDE 600; 310; 30; 20 MG/100ML; MG/100ML; MG/100ML; MG/100ML
INJECTION, SOLUTION INTRAVENOUS CONTINUOUS
Status: DISCONTINUED | OUTPATIENT
Start: 2020-09-14 | End: 2020-09-14 | Stop reason: HOSPADM

## 2020-09-14 RX ADMIN — PROPOFOL 300 MG: 10 INJECTION, EMULSION INTRAVENOUS at 10:31

## 2020-09-14 RX ADMIN — SODIUM CHLORIDE, SODIUM LACTATE, POTASSIUM CHLORIDE, AND CALCIUM CHLORIDE: .6; .31; .03; .02 INJECTION, SOLUTION INTRAVENOUS at 09:19

## 2020-09-14 RX ADMIN — SODIUM CHLORIDE, POTASSIUM CHLORIDE, SODIUM LACTATE AND CALCIUM CHLORIDE: 600; 310; 30; 20 INJECTION, SOLUTION INTRAVENOUS at 10:27

## 2020-09-14 RX ADMIN — LIDOCAINE HYDROCHLORIDE 100 MG: 40 INJECTION, SOLUTION RETROBULBAR; TOPICAL at 10:30

## 2020-09-14 ASSESSMENT — PAIN SCALES - GENERAL
PAINLEVEL_OUTOF10: 0
PAINLEVEL_OUTOF10: 0

## 2020-09-14 ASSESSMENT — PAIN - FUNCTIONAL ASSESSMENT: PAIN_FUNCTIONAL_ASSESSMENT: 0-10

## 2020-09-14 NOTE — OP NOTE
COLONOSCOPY PROCEDURE NOTE:      Pre op diagnosis:    1. Severe episode of diverticulitis in sept 2019, has had several recurrent episodes. Never had a CS at age 72, due to his fear of doing the prep. Operative Procedure:    1. Colonoscopy with cold forceps and hot forceps    Surgeon:    Dr. Mani Borja     Anesthesia:    IV sedation per CRNA    EBL:  minimal    Procedure:    Patient was taken to the endoscopy suite and placed in a left lateral decubitus position. They were given IV sedation as mentioned above. A digital rectal exam was performed. There were no masses and anal tone was normal.  The colonoscope was inserted into the rectum and carefully manipulated up through the sigmoid colon, transverse colon, right colon and into the cecum. The cecum was identified by the ileal cecal valve and transabdominal illumination. Then the scope was slowly withdrawn. The scope was retroflexed in the rectum and the dentate line was examined. The scope was removed. The patient tolerated the procedure well. The following findings were noted. Final Diagnosis:    1. Diffuse severe diverticulosis  2.  5 mm sessile polyp in the cecum, removed with hot forceps  3.  5 mm polyp at 10 cm removed with     Plan:    1. Await bx  2. Will have back to office to discuss treatment options including surgical options. It is difficult because the CT from severe episode in sept 2019 showed pneumointestinalis in the small bowel thought to be from diverticulitis. But no definite point of inflammation in the colon. Will need to review these films and possible redo a CT scan. ADDENDUM:  Endo Review :  9/19/2020    Pathology:    -- Diagnosis --   1. CECUM, POLYP, BIOPSIES:        -  ADENOMATOUS POLYPS (TUBULAR ADENOMAS). 2.  COLON, 10 CM, POLYP, BIOPSY:        -  HYPERPLASTIC POLYP. Plan:    1.   As above in the op note plan

## 2020-09-14 NOTE — ANESTHESIA PRE PROCEDURE
Department of Anesthesiology  Preprocedure Note       Name:  Nancie Romberg   Age:  72 y.o.  :  1950                                          MRN:  [id number]         Date:  2020      Surgeon: Lion Abbott):  Branden Cedeño MD    Procedure: Procedure(s):  COLONOSCOPY    Medications prior to admission:   Prior to Admission medications    Medication Sig Start Date End Date Taking? Authorizing Provider   polyethylene glycol (GLYCOLAX) 17 GM/SCOOP powder Take as directed day of bowel prep 20  Yes Branden Cedeño MD   bisacodyl (BISACODYL) 5 MG EC tablet Take as directed the day of bowel prep 20  Yes Branden Cedeño MD   rosuvastatin (CRESTOR) 10 MG tablet Take 1 tablet by mouth daily 20  Yes Gianluca Rosario MD   metFORMIN (GLUCOPHAGE-XR) 500 MG extended release tablet TAKE 2 TABLETS BY MOUTH TWO TIMES DAILY 3/19/20  Yes Gianluca Rosario MD   lisinopril (PRINIVIL;ZESTRIL) 10 MG tablet Take 1 tablet by mouth daily 3/4/20  Yes Gianluca Rosario MD   blood glucose test strips (ACCU-CHEK BYRON PLUS) strip TEST TWO TIMES DAILY FOR IRREGULAR BLOOD GLUCOSE 20   Gianluca Rosario MD   Blood Glucose Monitoring Suppl (ONE TOUCH ULTRA 2) by Does not apply route.  Tests blood sugar once a day    Historical Provider, MD       Current medications:    Current Facility-Administered Medications   Medication Dose Route Frequency Provider Last Rate Last Dose    lactated ringers infusion   Intravenous Continuous Branden Cedeño  mL/hr at 20 0919         Allergies:  No Known Allergies    Problem List:    Patient Active Problem List   Diagnosis Code    Hypertension I10    Type 2 diabetes mellitus without complication ([facility] [address] O36.2    Hyperlipidemia E78.5    Pneumatosis of intestines K63.89       Past Medical History:        Diagnosis Date    Anemia     minimal anemia, hemoglobin 13.6    Benign prostatic hypertrophy     Cyst in hand     distal phalanx, mid finger left hand    Hyperlipidemia     PROT 6.7 07/23/2020    CALCIUM 9.4 07/23/2020    BILITOT 0.29 07/23/2020    ALKPHOS 71 07/23/2020    AST 12 07/23/2020    ALT 18 07/23/2020       POC Tests:   Recent Labs     09/14/20  0917   POCGLU 93       Coags: No results found for: PROTIME, INR, APTT    HCG (If Applicable): No results found for: PREGTESTUR, PREGSERUM, HCG, HCGQUANT     ABGs: No results found for: PHART, PO2ART, NCR4EZG, HEE3PSZ, BEART, N5SZYCTM     Type & Screen (If Applicable):  No results found for: LABABO, LABRH    Drug/Infectious Status (If Applicable):  No results found for: HIV, HEPCAB    COVID-19 Screening (If Applicable):   Lab Results   Component Value Date    COVID19 Not Detected 09/10/2020         Anesthesia Evaluation  Patient summary reviewed and Nursing notes reviewed no history of anesthetic complications:   Airway: Mallampati: I  TM distance: >3 FB   Neck ROM: full  Mouth opening: > = 3 FB Dental: normal exam         Pulmonary:normal exam                               Cardiovascular:    (+) hypertension:,                   Neuro/Psych:               GI/Hepatic/Renal:   (+) bowel prep,           Endo/Other:    (+) Diabetes, . Abdominal:           Vascular:                                        Anesthesia Plan      general and TIVA     ASA 3       Induction: intravenous. Anesthetic plan and risks discussed with patient.       Plan discussed with surgical team.                  AXEL Cleeste - CRNA   9/14/2020

## 2020-09-14 NOTE — H&P
General Surgery History & Physical  Alicja Wolfe MD  Pt Name: Collin Arevalo  MRN: [id number]  [place]: 1950  Date of evaluation: 8/20/2020  Primary Care Physician: Ny Cheney MD     Patient evaluated at the request of Dr. Susi Deal  Reason for evaluation: diverticulitis, abdominal pain                   SUBJECTIVE:  Chief Complaint:       Chief Complaint   Patient presents with    Abdominal Pain    Diverticulitis     History of Present Illness:         Patient presents  Abd pain: yes, previously  Anemia: no  Bloating:no  Diarrhea: no  Constipation: no  Melena: no  Hematochezia:no  Rectal Bleeding:no  Rectal/Anal Pain:no  Pruritus: no  Family history colon Cancer: no  Previous colon cancer: no  Previous Colon Polyp: no  Change in bowels: no  Decrease caliber of stool: no  Change in color of stool: no     Previous work up date: No previous colonoscopy     Patient is a 81-year-old male who is here after several episodes of diverticulitis. He had a bad episode in September 2019. He went to the hospital and was hospitalized for several days. The CT showed diverticulitis with pneumo intestinalis in the small bowel. He was treated conservatively and resolved. He had another episode about 2 months ago but it is been mild. He did not get hospitalized he just took outpatient antibiotics. Today he has no pain. He is never had a colonoscopy because he is very scared to do the prep. He has no family history of colon cancer. Past Medical History   has a past medical history of Anemia, Benign prostatic hypertrophy, Cyst in hand, Hyperlipidemia, Hypertension, Keratosis, Overweight(278.02), Seborrhea, and Type II or unspecified type diabetes mellitus without mention of complication, not stated as uncontrolled. Past Surgical History   has no past surgical history on file. Family History  family history includes Cancer in his father and paternal uncle;  Cancer (age of onset: 64) in his brother; Diabetes in his mother; Stroke in his father. Social History  Tobacco use:  reports that he quit smoking about 19 years ago. His smoking use included cigarettes. He has a 60.00 pack-year smoking history. He has never used smokeless tobacco.  Alcohol use:  reports current alcohol use. Drug use:  reports no history of drug use. Medications  Current Medications:   Current Facility-Administered Medications          Current Outpatient Medications   Medication Sig Dispense Refill    rosuvastatin (CRESTOR) 10 MG tablet Take 1 tablet by mouth daily 90 tablet 3    blood glucose test strips (ACCU-CHEK BYRON PLUS) strip TEST TWO TIMES DAILY FOR IRREGULAR BLOOD GLUCOSE 100 strip 5    metFORMIN (GLUCOPHAGE-XR) 500 MG extended release tablet TAKE 2 TABLETS BY MOUTH TWO TIMES DAILY 360 tablet 3    lisinopril (PRINIVIL;ZESTRIL) 10 MG tablet Take 1 tablet by mouth daily 90 tablet 3    Blood Glucose Monitoring Suppl (ONE TOUCH ULTRA 2) by Does not apply route. Tests blood sugar once a day          No current facility-administered medications for this visit. Home Medications:   Home Medications           Prior to Admission medications    Medication Sig Start Date End Date Taking? Authorizing Provider   rosuvastatin (CRESTOR) 10 MG tablet Take 1 tablet by mouth daily 7/30/20   Yes Warren Zimmerman MD   blood glucose test strips (ACCU-CHEK BYRON PLUS) strip TEST TWO TIMES DAILY FOR IRREGULAR BLOOD GLUCOSE 7/17/20   Yes Warren Zimmerman MD   metFORMIN (GLUCOPHAGE-XR) 500 MG extended release tablet TAKE 2 TABLETS BY MOUTH TWO TIMES DAILY 3/19/20   Yes Warren Zimmerman MD   lisinopril (PRINIVIL;ZESTRIL) 10 MG tablet Take 1 tablet by mouth daily 3/4/20   Yes Warren Zimmerman MD   Blood Glucose Monitoring Suppl (ONE TOUCH ULTRA 2) by Does not apply route. Tests blood sugar once a day     Yes Historical Provider, MD           Allergies  Patient has no known allergies.         Review of Systems:  General: Denies any Lab Results   Component Value Date      07/23/2020     K 4.8 07/23/2020     CL 99 07/23/2020     CO2 28 07/23/2020     BUN 20 07/23/2020     CREATININE 0.87 07/23/2020     LFT's:         Lab Results   Component Value Date     AST 12 07/23/2020     ALT 18 07/23/2020     ALKPHOS 71 07/23/2020     BILITOT 0.29 07/23/2020     COAGS: No results found for: INR, PTT  Lipids:         Lab Results   Component Value Date     CHOL 99 07/23/2020     HDL 29 07/23/2020     LDLCHOLESTEROL 52 07/23/2020     CHOLHDLRATIO 3.4 07/23/2020     TRIG 91 07/23/2020     VLDL NOT REPORTED 07/23/2020        RADIOLOGY:  All images reviewed and within normal limits unless otherwise specified: Yes     IMPRESSIONS:   Diagnosis Orders   1. Diverticulitis        Patient with 1 severe episode 6 months ago with hospitalization with questionable microperforation or pneumo intestinalis. More mild episode 1 to 2 months ago. Asymptomatic at this time and no previous colonoscopy. We discussed the need for colonoscopy. We also discussed the option of possible colectomy versus continued observation. We will await the colonoscopy results and see how he is doing in several weeks to a month and then discuss observation versus surgical definitive care.         Surgical Risk: moderate risk     PLAN:  1. CS                     Medical Decision Making: moderate complexity

## 2020-09-16 LAB — SURGICAL PATHOLOGY REPORT: NORMAL

## 2020-10-01 ENCOUNTER — OFFICE VISIT (OUTPATIENT)
Dept: SURGERY | Age: 70
End: 2020-10-01
Payer: MEDICARE

## 2020-10-01 VITALS
SYSTOLIC BLOOD PRESSURE: 128 MMHG | DIASTOLIC BLOOD PRESSURE: 70 MMHG | BODY MASS INDEX: 26.22 KG/M2 | RESPIRATION RATE: 20 BRPM | HEIGHT: 72 IN | TEMPERATURE: 97.1 F | WEIGHT: 193.6 LBS | HEART RATE: 74 BPM

## 2020-10-01 PROCEDURE — 1036F TOBACCO NON-USER: CPT | Performed by: SURGERY

## 2020-10-01 PROCEDURE — G8484 FLU IMMUNIZE NO ADMIN: HCPCS | Performed by: SURGERY

## 2020-10-01 PROCEDURE — G8417 CALC BMI ABV UP PARAM F/U: HCPCS | Performed by: SURGERY

## 2020-10-01 PROCEDURE — 99212 OFFICE O/P EST SF 10 MIN: CPT

## 2020-10-01 PROCEDURE — G8427 DOCREV CUR MEDS BY ELIG CLIN: HCPCS | Performed by: SURGERY

## 2020-10-01 PROCEDURE — 99214 OFFICE O/P EST MOD 30 MIN: CPT | Performed by: SURGERY

## 2020-10-01 PROCEDURE — 3017F COLORECTAL CA SCREEN DOC REV: CPT | Performed by: SURGERY

## 2020-10-01 PROCEDURE — 4040F PNEUMOC VAC/ADMIN/RCVD: CPT | Performed by: SURGERY

## 2020-10-01 PROCEDURE — 1123F ACP DISCUSS/DSCN MKR DOCD: CPT | Performed by: SURGERY

## 2020-10-01 NOTE — PROGRESS NOTES
Armando Smith is a 72 y.o. male          CC:    . Diverticulitis      HISTORY OF PRESENT ILLNESS:    Patient is a 50-year-old male who has had several episodes of supposedly diverticulitis. He had a bad episode in September 2019 at [address] [facility] [address] He had a CT scan which showed pneumo intestinalis of the small bowel and was thought to be due to diverticulitis. However on further review of the CT scan it does not show any thickening of the sigmoid colon. They thought that there was some air in the small bowel due to a small tic in the small bowel that may have ruptured. There is also a left inguinal hernia seen on the CAT scan. He had several other episodes and is here to discuss further treatment. We did a recent colonoscopy and saw diffuse severe diverticulosis. He denies any pain in the inguinal area.       Review of Systems:    General:  Fever: Negative  Weight Change:Negative  Night Sweats: Negative    Eye:  Blurry Vision:Negative  Double Vision: Negative    Ent:  Headaches: Negative  Sore throat: Negative    Allergy/Immunology:  Hives: Negative  Latex allergy: Negative    Hematology/Lymphatic:  Bleeding Problems: Negative  Blood Clots: Negative  Swollen Lymph Nodes: Negative    Lungs:  Cough: Negative  SOB: Negative  Wheezing:Negative    Cardiovascular:  Chest Pain: Negative  Palpitations:Negative    GI:   Decreased Appetite: Negative  Heartburn: Negative  Dysphagia: Negative  Nausea/Vomiting: Negative  Abdominal Pain: Negative  Change in Bowels:Negative  Constipation: Negative  Diarrhea: Negative  Rectal Bleeding: Negative    :   Dysuria: Negative  Increase Urinary Frequency/Urgency: Negative    Neuro:  Seizures: Negative  Confusion: Negative        PAST MEDICAL HISTORY:        Family History   Problem Relation Age of Onset    Diabetes Mother     Cancer Father         throat cancer    Stroke Father     Cancer Brother 64        pancreatic    Cancer Paternal Uncle         lung     Social History forehead    Overweight(278.02)     Seborrhea     Type II or unspecified type diabetes mellitus without mention of complication, not stated as uncontrolled      Current Outpatient Medications on File Prior to Visit   Medication Sig Dispense Refill    polyethylene glycol (GLYCOLAX) 17 GM/SCOOP powder Take as directed day of bowel prep 255 g 0    bisacodyl (BISACODYL) 5 MG EC tablet Take as directed the day of bowel prep 4 tablet 0    rosuvastatin (CRESTOR) 10 MG tablet Take 1 tablet by mouth daily 90 tablet 3    blood glucose test strips (ACCU-CHEK BYRON PLUS) strip TEST TWO TIMES DAILY FOR IRREGULAR BLOOD GLUCOSE 100 strip 5    metFORMIN (GLUCOPHAGE-XR) 500 MG extended release tablet TAKE 2 TABLETS BY MOUTH TWO TIMES DAILY 360 tablet 3    lisinopril (PRINIVIL;ZESTRIL) 10 MG tablet Take 1 tablet by mouth daily 90 tablet 3    Blood Glucose Monitoring Suppl (ONE TOUCH ULTRA 2) by Does not apply route. Tests blood sugar once a day       No current facility-administered medications on file prior to visit. Allergies as of 10/01/2020    (No Known Allergies)         PHYSICAL EXAM:    Blood pressure 128/70, pulse 74, temperature 97.1 °F (36.2 °C), temperature source Temporal, resp. rate 20, height 6' (1.829 m), weight 193 lb 9.6 oz (87.8 kg). Gen:  A and O x 3, NAD, well nourished  Eyes:  Sclera non icterus, PERRL  Head:  Normocephalic, non-tender  Neck:  Supple, no adenopathy, thyroid non tender and no masses,no carotid bruits  Lungs:  CTA, symmetrical  Chest:  RRR, no murmurs  Abd:  Soft, NT, ND, no HSM,   Ext:  No edema, no cyanosis  Psych: reveals appropriate mood, memory and judgment,  Neuro:  Reveals no gross motor or sensory deficits,   Msk:  5/5 strength all 4 extremities, no joint tenderness    Hernia:  There is a positive left inguinal hernia that appears to be an indirect. The bulge is about 2 cm and goes down towards the top part of the scrotum. It is easily reducible spontaneously.   There is no tenderness. There is no hernia on the right side or near the umbilicus    ASSESS MENT:    1. Severe sigmoid diverticulosis  2. Left inguinal hernia    Patient has had several episodes of left-sided pain and abdominal issues with bloating. One specifically bad one was in September 2019 he was hospitalized with and there was some pneumo intestinalis noted as well. He resolved with conservative medical treatment. There is never been a area on the colon or sigmoid colon that definitely showed the area of inflammation. With the air in the small bowel only and no sign of any findings in the sigmoid colon that showed inflammation I wonder if there was not any issue with the left inguinal hernia. I did discuss with the patient that at this point I do not think I would do surgery on the diverticulitis because I would not be sure that removing the sigmoid colon with take care of the source of his hospitalization in September. I would consider doing a left inguinal hernia I think that that would be reasonable to prevent further future problems it possibly could have been the source of his problem in September and would give us a chance to look at the sigmoid colon without committing to surgery. However it is not certain that that would be the source of his pain either. So in general he has several episodes 1 very severe episode of left-sided abdominal pain that we are not sure of the cause. I think it could be diverticulosis although it strange there was no sign of any inflammation in the sigmoid colon at that time. At this point we will see him back in 2 months and see what the history of over the next 2 months is. If he continues to have these left-sided pains with no findings in the left inguinal area I would be more inclined to consider the sigmoid colon and diverticulitis is the problem. If he does notice any pain or bulge in the left inguinal area then I would consider that is possibly a source.   If he has no problems then our discussion would be whether to just do the hernia prophylactically to avoid problems in the future and get that out of the way. PLAN:    Return the last week of November or December to discuss about his left-sided abdominal pain and how he is done over the last 2 months.   We will decide future treatment of observation versus left inguinal hernia repair robotically versus possible sigmoid colectomy

## 2020-10-08 ENCOUNTER — HOSPITAL ENCOUNTER (OUTPATIENT)
Dept: LAB | Age: 70
Discharge: HOME | End: 2020-10-08
Payer: MEDICARE

## 2020-10-08 LAB
ESTIMATED AVERAGE GLUCOSE: 134 MG/DL
HBA1C MFR BLD: 6.3 % (ref 4.8–5.9)

## 2020-10-08 PROCEDURE — 36415 COLL VENOUS BLD VENIPUNCTURE: CPT

## 2020-10-08 PROCEDURE — 83036 HEMOGLOBIN GLYCOSYLATED A1C: CPT

## 2020-10-15 ENCOUNTER — OFFICE VISIT (OUTPATIENT)
Dept: DIABETES SERVICES | Age: 70
End: 2020-10-15
Payer: MEDICARE

## 2020-10-15 VITALS
WEIGHT: 197 LBS | RESPIRATION RATE: 16 BRPM | BODY MASS INDEX: 26.72 KG/M2 | DIASTOLIC BLOOD PRESSURE: 72 MMHG | HEART RATE: 69 BPM | SYSTOLIC BLOOD PRESSURE: 130 MMHG | OXYGEN SATURATION: 98 % | TEMPERATURE: 96.9 F

## 2020-10-15 PROCEDURE — 99212 OFFICE O/P EST SF 10 MIN: CPT

## 2020-10-15 PROCEDURE — 3017F COLORECTAL CA SCREEN DOC REV: CPT | Performed by: NURSE PRACTITIONER

## 2020-10-15 PROCEDURE — 2022F DILAT RTA XM EVC RTNOPTHY: CPT | Performed by: NURSE PRACTITIONER

## 2020-10-15 PROCEDURE — 3044F HG A1C LEVEL LT 7.0%: CPT | Performed by: NURSE PRACTITIONER

## 2020-10-15 PROCEDURE — 4040F PNEUMOC VAC/ADMIN/RCVD: CPT | Performed by: NURSE PRACTITIONER

## 2020-10-15 PROCEDURE — G8427 DOCREV CUR MEDS BY ELIG CLIN: HCPCS | Performed by: NURSE PRACTITIONER

## 2020-10-15 PROCEDURE — 1123F ACP DISCUSS/DSCN MKR DOCD: CPT | Performed by: NURSE PRACTITIONER

## 2020-10-15 PROCEDURE — 1036F TOBACCO NON-USER: CPT | Performed by: NURSE PRACTITIONER

## 2020-10-15 PROCEDURE — G8417 CALC BMI ABV UP PARAM F/U: HCPCS | Performed by: NURSE PRACTITIONER

## 2020-10-15 PROCEDURE — G8484 FLU IMMUNIZE NO ADMIN: HCPCS | Performed by: NURSE PRACTITIONER

## 2020-10-15 PROCEDURE — 99214 OFFICE O/P EST MOD 30 MIN: CPT | Performed by: NURSE PRACTITIONER

## 2020-10-15 ASSESSMENT — ENCOUNTER SYMPTOMS
DIARRHEA: 0
SHORTNESS OF BREATH: 0
RESPIRATORY NEGATIVE: 1
ABDOMINAL PAIN: 0

## 2020-10-15 NOTE — PROGRESS NOTES
[facility] [address], [address]  [facility] [address] [phone]        HISTORY:    Tatiana Farnsworth presents today for evaluation and management of:  Chief Complaint   Patient presents with    Diabetes       Diabetes   He presents for his follow-up diabetic visit. He has type 2 diabetes mellitus. No MedicAlert identification noted. His disease course has been improving. There are no hypoglycemic associated symptoms. Pertinent negatives for hypoglycemia include no confusion, dizziness, headaches, seizures or tremors. There are no diabetic associated symptoms. Pertinent negatives for diabetes include no chest pain, no polydipsia, no polyphagia and no polyuria. There are no hypoglycemic complications. Symptoms are stable. There are no diabetic complications. Risk factors for coronary artery disease include diabetes mellitus, dyslipidemia, family history, hypertension, male sex and obesity. Current diabetic treatment includes oral agent (dual therapy). He is compliant with treatment all of the time. His weight is stable. He is following a diabetic diet. When asked about meal planning, he reported none. He has not had a previous visit with a dietitian. He participates in exercise daily (walks). He monitors blood glucose at home 1-2 x per day. Blood glucose monitoring compliance is excellent. An ACE inhibitor/angiotensin II receptor blocker is being taken. Eye exam is current. Interval History:    Current Diabetic Medications  Metformin 1000 mg bid    DKA episodes: 0    10/15/20   He his doing well with diet and medication compliance. He test bs once daily around 4 am with a higher than expected average of 150. He does not sleep well, snoring seems to be better with weight loss. Not interested in sleep study. He does toss and turn with shoulder pain. He does not notice a correlation with what he at the day before.  He is also seen Dr. Kinsey Rodriguez for diverticulitis and hernia issues. Diet: low carb  Exercise: physically active during the day   BS testing: once daily average of 150 later day bs average 80  Issues:     High cholesterol-  Takes crestor and denies any adverse effects with its use. Watches diet and exercise. Hypertension-  Takes lisinopril and denies any adverse effects with their use. Watches diet and exercise. Denies any chest pain, dizziness or edema.             Past Medical History:   Diagnosis Date    Anemia     minimal anemia, hemoglobin 13.6    Benign prostatic hypertrophy     Cyst in hand     distal phalanx, mid finger left hand    Hyperlipidemia     Hypertension     Keratosis     right forehead    Overweight(278.02)     Seborrhea     Type II or unspecified type diabetes mellitus without mention of complication, not stated as uncontrolled      Family History   Problem Relation Age of Onset    Diabetes Mother     Cancer Father         throat cancer    Stroke Father     Cancer Brother 64        pancreatic    Cancer Paternal Uncle         lung     Social History     Tobacco Use    Smoking status: Former Smoker     Packs/day: 3.00     Years: 20.00     Pack years: 60.00     Types: Cigarettes     Last attempt to quit: 2000     Years since quittin.8    Smokeless tobacco: Never Used   Substance Use Topics    Alcohol use: Yes     Comment: no alcohol ingestion after a heavier intake eearlier in his life    Drug use: No     No Known Allergies    MEDICATIONS:  Current Outpatient Medications   Medication Sig Dispense Refill    rosuvastatin (CRESTOR) 10 MG tablet Take 1 tablet by mouth daily 90 tablet 3    blood glucose test strips (ACCU-CHEK BYRON PLUS) strip TEST TWO TIMES DAILY FOR IRREGULAR BLOOD GLUCOSE 100 strip 5    metFORMIN (GLUCOPHAGE-XR) 500 MG extended release tablet TAKE 2 TABLETS BY MOUTH TWO TIMES DAILY 360 tablet 3    lisinopril (PRINIVIL;ZESTRIL) 10 MG tablet Take 1 tablet by mouth daily 90 tablet 3    Blood Glucose Monitoring Suppl (ONE TOUCH ULTRA 2) by Does not apply route. Tests blood sugar once a day       No current facility-administered medications for this visit. Review ofSymptoms:  Review of Systems   Constitutional: Negative for unexpected weight change. Eyes: Negative for visual disturbance. Respiratory: Negative. Negative for shortness of breath. Cardiovascular: Negative for chest pain and leg swelling. Gastrointestinal: Negative for abdominal pain and diarrhea. Endocrine: Negative for polydipsia, polyphagia and polyuria. Genitourinary: Negative. Musculoskeletal: Negative. Skin: Negative for rash and wound. Neurological: Negative for dizziness, tremors, seizures and headaches. Psychiatric/Behavioral: Negative. Negative for confusion and decreased concentration. Theremainder of a complete 14-point review of systems is negative. Vital Signs: /72   Pulse 69   Temp 96.9 °F (36.1 °C)   Resp 16   Wt 197 lb (89.4 kg)   SpO2 98%   BMI 26.72 kg/m²      Wt Readings from Last 3 Encounters:   10/15/20 197 lb (89.4 kg)   10/01/20 193 lb 9.6 oz (87.8 kg)   09/14/20 189 lb 14.4 oz (86.1 kg)     Body mass index is 26.72 kg/m².   LABS:  Hemoglobin A1C   Date Value Ref Range Status   10/08/2020 6.3 (H) 4.8 - 5.9 % Final   07/23/2020 6.5 (H) 4.8 - 5.9 % Final     Lab Results   Component Value Date    LABMICR CANNOT BE CALCULATED 07/23/2020     Lab Results   Component Value Date     07/23/2020    K 4.8 07/23/2020    CL 99 07/23/2020    CO2 28 07/23/2020    BUN 20 07/23/2020    CREATININE 0.87 07/23/2020    GLUCOSE 135 (H) 07/23/2020    CALCIUM 9.4 07/23/2020    PROT 6.7 07/23/2020    LABALBU 4.2 07/23/2020    BILITOT 0.29 (L) 07/23/2020    ALKPHOS 71 07/23/2020    AST 12 07/23/2020    ALT 18 07/23/2020    LABGLOM >60 07/23/2020    GFRAA >60 07/23/2020     Lab Results   Component Value Date    CHOL 99 07/23/2020    CHOL 101 07/16/2019 CHOL 122 07/13/2018     Lab Results   Component Value Date    TRIG 91 07/23/2020    TRIG 98 07/16/2019    TRIG 282 (H) 07/13/2018     Lab Results   Component Value Date    HDL 29 (L) 07/23/2020    HDL 30 (L) 07/16/2019    HDL 28 (L) 07/13/2018     Lab Results   Component Value Date    LDLCHOLESTEROL 52 07/23/2020    LDLCHOLESTEROL 51 07/16/2019    LDLCHOLESTEROL 38 07/13/2018     Lab Results   Component Value Date    VLDL NOT REPORTED 07/23/2020    VLDL NOT REPORTED 07/16/2019    VLDL NOT REPORTED 07/13/2018     Lab Results   Component Value Date    CHOLHDLRATIO 3.4 07/23/2020    CHOLHDLRATIO 3.4 07/16/2019    CHOLHDLRATIO 4.4 07/13/2018           Physical Exam  Constitutional:       Appearance: He is well-developed. Eyes:      Pupils: Pupils are equal, round, and reactive to light. Cardiovascular:      Rate and Rhythm: Normal rate and regular rhythm. Pulmonary:      Effort: Pulmonary effort is normal.      Breath sounds: Normal breath sounds. Skin:     General: Skin is warm and dry. Findings: No lesion (no lipohypertrophy) or rash. Neurological:      Mental Status: He is alert and oriented to person, place, and time. Sensory: No sensory deficit. Psychiatric:         Speech: Speech normal.         Behavior: Behavior normal.         Thought Content: Thought content normal.         Judgment: Judgment normal.           ASSESSMENT/PLAN:     Diagnosis Orders   1. Type 2 diabetes mellitus without complication, without long-term current use of insulin ([facility]     2. Diabetes education, encounter for     3. Hyperlipidemia, unspecified hyperlipidemia type     4. Essential hypertension       No orders of the defined types were placed in this encounter. No orders of the defined types were placed in this encounter. Requested Prescriptions      No prescriptions requested or ordered in this encounter       1.  Type 2 diabetes mellitus without complication, without long-term current use of insulin ([facility]  2. Diabetes education, encounter for  - Stable  HbA1C goal is less than 7% and blood sugars are improving.  - Encouraged patient to check BS 1 times per day. Fasting blood glucose goal is 70-130mg/d  l and postprandial blood sugar goal is less than 180 mg/dl. -Diabetic foot exam up-to-date: Yes  -Diabetic retinal exam up-to-date: Yes  - Labs reviewed includes: most recent A1c 6.3% GFR >60. Repeat labs due in 3 months.   -We discussed in great detail dietary modifications they can make to better improve their blood sugars. -follow up diabetes education completed, all questions answered.  -discussed possibilities of elevated fasting glucose. Including sleep apnea, interrupted sleep, late night snacking. Discussed with patient to monitor blood sugars throughout the day checking postprandial glucose. Patient can also try taking Metformin at bedtime. At this time there is no need to add additional medications. Discussed signs and symptoms of hyper/hypoglycemia and how to treat. Encouraged 150 minutes of physical activity per week. Follow a low carbohydrate diet consuming 45 grams of carbohydrates at breakfast, lunch and dinner with two separate snacks [id number] grams of carbohydrates. Encouraged at least 7 hours of sleep. The patient was informed of the goals of diabetes management. This can only be accomplished by watching their diet and exercise levels. We certainly use medicines to help attain these goals. The consequences of not controlling blood sugars were discussed. These include blindness, heart disease, stroke, kidney disease, and possibly need for dialysis. They were told to be careful with their foot care as diabetics often have nerve damage, infections and risk for limb amutations . They also need a dilated eye exam yearly.  We discussed the issues of diet, exercise, medication, complication avoidance, reviewed the signs and symptoms of diabetes, hypoglycemic episodes, significance of HbA1C.       3. Hyperlipidemia, unspecified hyperlipidemia type  stable, lipid panel reviewed, continue current medications. Diet and exercise      4. Essential hypertension   stable, continue current medications. Diet and exercise Seek emergent care if chest pain develops. Answered all patient questions. Agrees to follow plan of care and to follow up in 6 months, sooner if needed. Call office if unexplained blood sugars less than 70 occur or above 400. Call office or access MyChart with any further questions or concerns. Be sure to bring glucometer/food log at next appointment. Patient was seen with total face to face time of 30 minutes. More than 50%  of this visit was counseling and education regarding his diabetes.     Electronically signed by AXEL Alicia CNP on 10/15/2020 at 8:15 AM      (Please note that portions of this note were completed with a voice-recognition program. Efforts were made to edit the dictation but occasionally words are mis-transcribed.)

## 2020-11-27 ENCOUNTER — OFFICE VISIT (OUTPATIENT)
Dept: PRIMARY CARE CLINIC | Age: 70
End: 2020-11-27
Payer: MEDICARE

## 2020-11-27 VITALS
HEIGHT: 72 IN | BODY MASS INDEX: 27.36 KG/M2 | TEMPERATURE: 98 F | SYSTOLIC BLOOD PRESSURE: 134 MMHG | HEART RATE: 72 BPM | DIASTOLIC BLOOD PRESSURE: 76 MMHG | WEIGHT: 202 LBS

## 2020-11-27 PROCEDURE — 99212 OFFICE O/P EST SF 10 MIN: CPT

## 2020-11-27 PROCEDURE — 99213 OFFICE O/P EST LOW 20 MIN: CPT | Performed by: PHYSICIAN ASSISTANT

## 2020-11-27 PROCEDURE — G8417 CALC BMI ABV UP PARAM F/U: HCPCS | Performed by: PHYSICIAN ASSISTANT

## 2020-11-27 PROCEDURE — 1123F ACP DISCUSS/DSCN MKR DOCD: CPT | Performed by: PHYSICIAN ASSISTANT

## 2020-11-27 PROCEDURE — G8484 FLU IMMUNIZE NO ADMIN: HCPCS | Performed by: PHYSICIAN ASSISTANT

## 2020-11-27 PROCEDURE — 3017F COLORECTAL CA SCREEN DOC REV: CPT | Performed by: PHYSICIAN ASSISTANT

## 2020-11-27 PROCEDURE — G8427 DOCREV CUR MEDS BY ELIG CLIN: HCPCS | Performed by: PHYSICIAN ASSISTANT

## 2020-11-27 PROCEDURE — 1036F TOBACCO NON-USER: CPT | Performed by: PHYSICIAN ASSISTANT

## 2020-11-27 PROCEDURE — 4040F PNEUMOC VAC/ADMIN/RCVD: CPT | Performed by: PHYSICIAN ASSISTANT

## 2020-11-27 RX ORDER — PREDNISONE 20 MG/1
20 TABLET ORAL 2 TIMES DAILY
Qty: 10 TABLET | Refills: 0 | Status: SHIPPED | OUTPATIENT
Start: 2020-11-27 | End: 2020-12-02

## 2020-11-27 RX ORDER — BACLOFEN 10 MG/1
10 TABLET ORAL 3 TIMES DAILY PRN
Qty: 30 TABLET | Refills: 0 | Status: SHIPPED | OUTPATIENT
Start: 2020-11-27 | End: 2021-01-29

## 2020-11-27 ASSESSMENT — ENCOUNTER SYMPTOMS: RESPIRATORY NEGATIVE: 1

## 2020-11-27 NOTE — PROGRESS NOTES
Subjective:      Patient ID: Amando Mccord is a 72 y.o. male. Arm Pain    The incident occurred 3 to 5 days ago. There was no injury mechanism. The pain is present in the left elbow. Radiates to: left shoulder. The pain has been worsening since the incident. Associated symptoms include numbness. Pertinent negatives include no muscle weakness. Nothing aggravates the symptoms. He has tried immobilization for the symptoms. The treatment provided no relief. Review of Systems   HENT: Negative. Respiratory: Negative. Cardiovascular: Negative. Musculoskeletal: Positive for neck pain. Neurological: Positive for numbness. Objective:   Physical Exam  HENT:      Head: Normocephalic. Cardiovascular:      Rate and Rhythm: Normal rate. Pulmonary:      Effort: Pulmonary effort is normal.      Breath sounds: Normal breath sounds. Musculoskeletal:      Right shoulder: He exhibits tenderness and spasm. Left shoulder: He exhibits normal range of motion. Left elbow: He exhibits normal range of motion. Arms:    Neurological:      General: No focal deficit present. Mental Status: He is alert and oriented to person, place, and time. Assessment:      1. Cervical radiculopathy          Plan:      Prednisone 20 mg bid x 5 days. Heat/ice. ROM exercises. Baclofen PRN spasm. Encouraged patient to schedule appointment with massage therapist.  Follow-up with PCP.         SUMIT Loving

## 2020-12-03 ENCOUNTER — OFFICE VISIT (OUTPATIENT)
Dept: SURGERY | Age: 70
End: 2020-12-03
Payer: MEDICARE

## 2020-12-03 VITALS
HEART RATE: 62 BPM | DIASTOLIC BLOOD PRESSURE: 72 MMHG | BODY MASS INDEX: 26.74 KG/M2 | WEIGHT: 197.4 LBS | SYSTOLIC BLOOD PRESSURE: 139 MMHG | HEIGHT: 72 IN | OXYGEN SATURATION: 98 % | TEMPERATURE: 97.3 F

## 2020-12-03 PROCEDURE — 1036F TOBACCO NON-USER: CPT | Performed by: SURGERY

## 2020-12-03 PROCEDURE — 99213 OFFICE O/P EST LOW 20 MIN: CPT | Performed by: SURGERY

## 2020-12-03 PROCEDURE — G8427 DOCREV CUR MEDS BY ELIG CLIN: HCPCS | Performed by: SURGERY

## 2020-12-03 PROCEDURE — 1123F ACP DISCUSS/DSCN MKR DOCD: CPT | Performed by: SURGERY

## 2020-12-03 PROCEDURE — G8417 CALC BMI ABV UP PARAM F/U: HCPCS | Performed by: SURGERY

## 2020-12-03 PROCEDURE — 99213 OFFICE O/P EST LOW 20 MIN: CPT

## 2020-12-03 PROCEDURE — G8484 FLU IMMUNIZE NO ADMIN: HCPCS | Performed by: SURGERY

## 2020-12-03 PROCEDURE — 3017F COLORECTAL CA SCREEN DOC REV: CPT | Performed by: SURGERY

## 2020-12-03 PROCEDURE — 4040F PNEUMOC VAC/ADMIN/RCVD: CPT | Performed by: SURGERY

## 2020-12-03 NOTE — PROGRESS NOTES
use included cigarettes. He has a 60.00 pack-year smoking history. He has never used smokeless tobacco.  Alcohol use:  reports current alcohol use. Drug use:  reports no history of drug use. Medications  Current Medications:   Current Outpatient Medications   Medication Sig Dispense Refill    psyllium (KONSYL) 28.3 % PACK Take 1 packet by mouth daily Twice daily      baclofen (LIORESAL) 10 MG tablet Take 1 tablet by mouth 3 times daily as needed (spasm/pain) 30 tablet 0    rosuvastatin (CRESTOR) 10 MG tablet Take 1 tablet by mouth daily 90 tablet 3    blood glucose test strips (ACCU-CHEK BYRON PLUS) strip TEST TWO TIMES DAILY FOR IRREGULAR BLOOD GLUCOSE 100 strip 5    metFORMIN (GLUCOPHAGE-XR) 500 MG extended release tablet TAKE 2 TABLETS BY MOUTH TWO TIMES DAILY 360 tablet 3    lisinopril (PRINIVIL;ZESTRIL) 10 MG tablet Take 1 tablet by mouth daily 90 tablet 3    Blood Glucose Monitoring Suppl (ONE TOUCH ULTRA 2) by Does not apply route. Tests blood sugar once a day       No current facility-administered medications for this visit. Home Medications:   Prior to Admission medications    Medication Sig Start Date End Date Taking?  Authorizing Provider   psyllium (KONSYL) 28.3 % PACK Take 1 packet by mouth daily Twice daily   Yes Historical Provider, MD   baclofen (LIORESAL) 10 MG tablet Take 1 tablet by mouth 3 times daily as needed (spasm/pain) 11/27/20  Yes SUMIT Womack   rosuvastatin (CRESTOR) 10 MG tablet Take 1 tablet by mouth daily 7/30/20  Yes Deisy Flores MD   blood glucose test strips (ACCU-CHEK BYRON PLUS) strip TEST TWO TIMES DAILY FOR IRREGULAR BLOOD GLUCOSE 7/17/20  Yes Deisy Flores MD   metFORMIN (GLUCOPHAGE-XR) 500 MG extended release tablet TAKE 2 TABLETS BY MOUTH TWO TIMES DAILY 3/19/20  Yes Deisy Flores MD   lisinopril (PRINIVIL;ZESTRIL) 10 MG tablet Take 1 tablet by mouth daily 3/4/20  Yes Deisy Flores MD   Blood Glucose Monitoring Suppl (ONE TOUCH ULTRA 2) by Does not apply route. Tests blood sugar once a day   Yes Historical Provider, MD       Allergies  Patient has no known allergies. Review of Systems:  General: Denies any fever, chills. HEENT: Denies any diplopia, tinnitus or vertigo. Respiratory: Denies any shortness of breath or cough. Cardiac: Denies any chest pain, palpitations, claudication or edema. Gastrointestinal: Denies any melena, hematochezia, hematemesis or pyrosis. Genitourinary: Denies any frequency, urgency, hesitancy or incontinence. Hematologic: Denies bruising or bleeding easily. Endocrine: Denies any history of diabetes or thyroid disease. PHYSICAL EXAMINATION  Vitals:   Vitals:    12/03/20 1325   BP: 139/72   Pulse: 62   Temp: 97.3 °F (36.3 °C)   SpO2: 98%     General Appearance:  awake, alert, oriented, in no acute distress, well developed, well nourished and in no acute distress  Skin:  Skin color, texture, turgor normal. No rashes or lesions. Head/face:  NCAT  Eyes:  No gross abnormalities. , PERRL, Pupils- PERRL. Ears:  canals and TMs NI and External- normal  Nose/Sinuses:  Nares normal. Septum midline. Mucosa normal. No drainage or sinus tenderness. Mouth/Throat:  Mucosa moist.  No lesions. Pharynx without erythema, edema or exudate. Lungs:  Normal expansion. Clear to auscultation. No rales, rhonchi, or wheezing., Breathing Pattern: regular, no distress, Breath sounds: normal  Heart:  Heart sounds are normal.  Regular rate and rhythm without murmur, gallop or rub. Auscultation: Normal S1 and S2.  Regular rhythm. No murmurs, gallops, or rubs. Abdomen:  Soft, non-tender, normal bowel sounds. No bruits, organomegaly or masses.   Musculoskeletal:  negative, negative findings: no erythema, induration, or nodules, ROM of all joints is normal, strength normal  Neurologic:  negative findings: proximal muscle strength normal, speech normal, mental status intact, cranial nerves 2-12 intact, muscle tone normal, muscle risk    PLAN:  1. Medical management of divericulosis  2. Recommend robotic left ing hernia repair. But later when the covid issues calm down. 3. Will follow up in jan or feb  4. Medical Decision Making: moderate complexity    Thank you for the interesting evaluation. Further recommendations to follow.     Ailcja Wolfe MD  Electronically signed 12/3/2020 at 2:22 PM

## 2020-12-14 RX ORDER — LISINOPRIL 10 MG/1
TABLET ORAL
Qty: 90 TABLET | Refills: 3 | Status: SHIPPED | OUTPATIENT
Start: 2020-12-14 | End: 2022-02-24

## 2020-12-14 RX ORDER — METFORMIN HYDROCHLORIDE 500 MG/1
TABLET, EXTENDED RELEASE ORAL
Qty: 360 TABLET | Refills: 3 | Status: SHIPPED | OUTPATIENT
Start: 2020-12-14 | End: 2022-02-24

## 2021-01-22 ENCOUNTER — HOSPITAL ENCOUNTER (OUTPATIENT)
Dept: LAB | Age: 71
Discharge: HOME | End: 2021-01-22
Payer: MEDICARE

## 2021-01-22 DIAGNOSIS — D50.8 OTHER IRON DEFICIENCY ANEMIA: ICD-10-CM

## 2021-01-22 DIAGNOSIS — E11.9 TYPE 2 DIABETES MELLITUS WITHOUT COMPLICATION, WITHOUT LONG-TERM CURRENT USE OF INSULIN (HCC): ICD-10-CM

## 2021-01-22 LAB
ESTIMATED AVERAGE GLUCOSE: 146 MG/DL
HBA1C MFR BLD: 6.7 % (ref 4–6)
HEMOGLOBIN: 13.8 G/DL (ref 13–17)
IRON SATURATION: 42 % (ref 20–55)
IRON: 122 UG/DL (ref 59–158)
TOTAL IRON BINDING CAPACITY: 288 UG/DL (ref 250–450)
UNSATURATED IRON BINDING CAPACITY: 166 UG/DL (ref 112–347)

## 2021-01-22 PROCEDURE — 83550 IRON BINDING TEST: CPT

## 2021-01-22 PROCEDURE — 83540 ASSAY OF IRON: CPT

## 2021-01-22 PROCEDURE — 36415 COLL VENOUS BLD VENIPUNCTURE: CPT

## 2021-01-22 PROCEDURE — 85018 HEMOGLOBIN: CPT

## 2021-01-22 PROCEDURE — 83036 HEMOGLOBIN GLYCOSYLATED A1C: CPT

## 2021-01-27 DIAGNOSIS — E11.9 TYPE 2 DIABETES MELLITUS WITHOUT COMPLICATION, WITHOUT LONG-TERM CURRENT USE OF INSULIN (HCC): Primary | ICD-10-CM

## 2021-01-29 ENCOUNTER — OFFICE VISIT (OUTPATIENT)
Dept: INTERNAL MEDICINE | Age: 71
End: 2021-01-29
Payer: MEDICARE

## 2021-01-29 VITALS
RESPIRATION RATE: 16 BRPM | WEIGHT: 204 LBS | DIASTOLIC BLOOD PRESSURE: 80 MMHG | HEART RATE: 66 BPM | HEIGHT: 72 IN | SYSTOLIC BLOOD PRESSURE: 128 MMHG | BODY MASS INDEX: 27.63 KG/M2

## 2021-01-29 DIAGNOSIS — Z00.00 ROUTINE GENERAL MEDICAL EXAMINATION AT A HEALTH CARE FACILITY: Primary | ICD-10-CM

## 2021-01-29 DIAGNOSIS — Z12.5 SPECIAL SCREENING FOR MALIGNANT NEOPLASM OF PROSTATE: ICD-10-CM

## 2021-01-29 DIAGNOSIS — Z00.00 MEDICARE ANNUAL WELLNESS VISIT, SUBSEQUENT: ICD-10-CM

## 2021-01-29 DIAGNOSIS — I10 ESSENTIAL HYPERTENSION: ICD-10-CM

## 2021-01-29 DIAGNOSIS — D50.9 IRON DEFICIENCY ANEMIA, UNSPECIFIED IRON DEFICIENCY ANEMIA TYPE: ICD-10-CM

## 2021-01-29 DIAGNOSIS — E11.9 TYPE 2 DIABETES MELLITUS WITHOUT COMPLICATION, WITHOUT LONG-TERM CURRENT USE OF INSULIN (HCC): ICD-10-CM

## 2021-01-29 DIAGNOSIS — E78.49 OTHER HYPERLIPIDEMIA: ICD-10-CM

## 2021-01-29 PROCEDURE — G8484 FLU IMMUNIZE NO ADMIN: HCPCS | Performed by: INTERNAL MEDICINE

## 2021-01-29 PROCEDURE — 4040F PNEUMOC VAC/ADMIN/RCVD: CPT | Performed by: INTERNAL MEDICINE

## 2021-01-29 PROCEDURE — G8427 DOCREV CUR MEDS BY ELIG CLIN: HCPCS | Performed by: INTERNAL MEDICINE

## 2021-01-29 PROCEDURE — G0463 HOSPITAL OUTPT CLINIC VISIT: HCPCS

## 2021-01-29 PROCEDURE — 3044F HG A1C LEVEL LT 7.0%: CPT | Performed by: INTERNAL MEDICINE

## 2021-01-29 PROCEDURE — 99214 OFFICE O/P EST MOD 30 MIN: CPT | Performed by: INTERNAL MEDICINE

## 2021-01-29 PROCEDURE — 3017F COLORECTAL CA SCREEN DOC REV: CPT | Performed by: INTERNAL MEDICINE

## 2021-01-29 PROCEDURE — 1036F TOBACCO NON-USER: CPT | Performed by: INTERNAL MEDICINE

## 2021-01-29 PROCEDURE — G0439 PPPS, SUBSEQ VISIT: HCPCS | Performed by: INTERNAL MEDICINE

## 2021-01-29 PROCEDURE — 2022F DILAT RTA XM EVC RTNOPTHY: CPT | Performed by: INTERNAL MEDICINE

## 2021-01-29 PROCEDURE — 99397 PER PM REEVAL EST PAT 65+ YR: CPT

## 2021-01-29 PROCEDURE — 1123F ACP DISCUSS/DSCN MKR DOCD: CPT | Performed by: INTERNAL MEDICINE

## 2021-01-29 PROCEDURE — G8417 CALC BMI ABV UP PARAM F/U: HCPCS | Performed by: INTERNAL MEDICINE

## 2021-01-29 ASSESSMENT — PATIENT HEALTH QUESTIONNAIRE - PHQ9
SUM OF ALL RESPONSES TO PHQ QUESTIONS 1-9: 0
2. FEELING DOWN, DEPRESSED OR HOPELESS: 0
1. LITTLE INTEREST OR PLEASURE IN DOING THINGS: 0
SUM OF ALL RESPONSES TO PHQ9 QUESTIONS 1 & 2: 0
SUM OF ALL RESPONSES TO PHQ QUESTIONS 1-9: 0

## 2021-01-29 ASSESSMENT — LIFESTYLE VARIABLES
HOW OFTEN DO YOU HAVE A DRINK CONTAINING ALCOHOL: TWO TO THREE TIMES A WEEK
HOW MANY STANDARD DRINKS CONTAINING ALCOHOL DO YOU HAVE ON A TYPICAL DAY: 0
HAVE YOU OR SOMEONE ELSE BEEN INJURED AS A RESULT OF YOUR DRINKING: NO
HOW OFTEN DURING THE LAST YEAR HAVE YOU HAD A FEELING OF GUILT OR REMORSE AFTER DRINKING: NEVER
AUDIT TOTAL SCORE: 0
HOW OFTEN DURING THE LAST YEAR HAVE YOU NEEDED AN ALCOHOLIC DRINK FIRST THING IN THE MORNING TO GET YOURSELF GOING AFTER A NIGHT OF HEAVY DRINKING: NEVER
HOW OFTEN DURING THE LAST YEAR HAVE YOU FOUND THAT YOU WERE NOT ABLE TO STOP DRINKING ONCE YOU HAD STARTED: NEVER
AUDIT-C TOTAL SCORE: 3
AUDIT-C TOTAL SCORE: 0
HOW OFTEN DURING THE LAST YEAR HAVE YOU NEEDED AN ALCOHOLIC DRINK FIRST THING IN THE MORNING TO GET YOURSELF GOING AFTER A NIGHT OF HEAVY DRINKING: 0
HOW OFTEN DURING THE LAST YEAR HAVE YOU FAILED TO DO WHAT WAS NORMALLY EXPECTED FROM YOU BECAUSE OF DRINKING: NEVER
HOW OFTEN DURING THE LAST YEAR HAVE YOU BEEN UNABLE TO REMEMBER WHAT HAPPENED THE NIGHT BEFORE BECAUSE YOU HAD BEEN DRINKING: NEVER
HOW OFTEN DURING THE LAST YEAR HAVE YOU BEEN UNABLE TO REMEMBER WHAT HAPPENED THE NIGHT BEFORE BECAUSE YOU HAD BEEN DRINKING: 0
HAVE YOU OR SOMEONE ELSE BEEN INJURED AS A RESULT OF YOUR DRINKING: 0
HOW OFTEN DO YOU HAVE SIX OR MORE DRINKS ON ONE OCCASION: NEVER
AUDIT TOTAL SCORE: 3
HOW MANY STANDARD DRINKS CONTAINING ALCOHOL DO YOU HAVE ON A TYPICAL DAY: ONE OR TWO
HAS A RELATIVE, FRIEND, DOCTOR, OR ANOTHER HEALTH PROFESSIONAL EXPRESSED CONCERN ABOUT YOUR DRINKING OR SUGGESTED YOU CUT DOWN: NO
HOW OFTEN DO YOU HAVE SIX OR MORE DRINKS ON ONE OCCASION: 0
HOW OFTEN DO YOU HAVE A DRINK CONTAINING ALCOHOL: 3

## 2021-01-29 ASSESSMENT — ENCOUNTER SYMPTOMS
SHORTNESS OF BREATH: 0
CONSTIPATION: 0
NAUSEA: 0
DIARRHEA: 0
COUGH: 0
BACK PAIN: 0
EYE PAIN: 0
BLOOD IN STOOL: 0
VOMITING: 0
ABDOMINAL PAIN: 0

## 2021-01-29 NOTE — PATIENT INSTRUCTIONS
Personalized Preventive Plan for [place] [place] - 1/29/2021  Medicare offers a range of preventive health benefits. Some of the tests and screenings are paid in full while other may be subject to a deductible, co-insurance, and/or copay. Some of these benefits include a comprehensive review of your medical history including lifestyle, illnesses that may run in your family, and various assessments and screenings as appropriate. After reviewing your medical record and screening and assessments performed today your provider may have ordered immunizations, labs, imaging, and/or referrals for you. A list of these orders (if applicable) as well as your Preventive Care list are included within your After Visit Summary for your review. Other Preventive Recommendations:    · A preventive eye exam performed by an eye specialist is recommended every 1-2 years to screen for glaucoma; cataracts, macular degeneration, and other eye disorders. · A preventive dental visit is recommended every 6 months. · Try to get at least 150 minutes of exercise per week or 10,000 steps per day on a pedometer . · Order or download the FREE \"Exercise & Physical Activity: Your Everyday Guide\" from [employer]. Call 5-353.619.9394 or search [employer] online. · You need 8032-7425 mg of calcium and 2582-0108 IU of vitamin D per day. It is possible to meet your calcium requirement with diet alone, but a vitamin D supplement is usually necessary to meet this goal.  · When exposed to the sun, use a sunscreen that protects against both UVA and UVB radiation with an SPF of 30 or greater. Reapply every 2 to 3 hours or after sweating, drying off with a towel, or swimming. · Always wear a seat belt when traveling in a car. Always wear a helmet when riding a bicycle or motorcycle.

## 2021-01-29 NOTE — PROGRESS NOTES
[place] [address]  Dept: [phone]  Dept Fax: [phone]  Loc: [phone]     Nahomi Mooney is a 72 y.o. male who presents today for his medical conditions/complaintsas noted below. Nahomi Mooney is c/o of   Chief Complaint   Patient presents with    Medicare AWV     6 month    Hypertension    Hyperlipidemia    Diabetes         HPI:     Hypertension  This is a chronic (essential htn) problem. The current episode started more than 1 year ago. The problem has been waxing and waning since onset. The problem is controlled. Pertinent negatives include no chest pain, headaches, neck pain, palpitations or shortness of breath. Hyperlipidemia  This is a chronic problem. The current episode started more than 1 year ago. The problem is controlled. Recent lipid tests were reviewed and are variable. Pertinent negatives include no chest pain or shortness of breath. Diabetes  He presents for his follow-up diabetic visit. He has type 2 diabetes mellitus. His disease course has been fluctuating. Pertinent negatives for hypoglycemia include no confusion, dizziness, headaches, nervousness/anxiousness or pallor. Pertinent negatives for diabetes include no chest pain, no polydipsia, no polyuria and no weakness. Other  This is a recurrent (4-general medical exam) problem. The current episode started today. The problem occurs intermittently. The problem has been waxing and waning. Pertinent negatives include no abdominal pain, arthralgias, chest pain, chills, coughing, fever, headaches, nausea, neck pain, numbness, rash, vomiting or weakness. Hemoglobin A1C (%)   Date Value   01/22/2021 6.7 (H)   10/08/2020 6.3 (H)   07/23/2020 6.5 (H)            Microalb/Crt.  Ratio (mcg/mg creat)   Date Value   07/23/2020 CANNOT BE CALCULATED     LDL Cholesterol (mg/dL)   Date Value   07/23/2020 52 2019 51   2018 38         AST (U/L)   Date Value   2020 12     ALT (U/L)   Date Value   2020 18     BUN (mg/dL)   Date Value   2020 20     BP Readings from Last 3 Encounters:   21 128/80   20 139/72   20 134/76              Past Medical History:   Diagnosis Date    Anemia     minimal anemia, hemoglobin 13.6    Benign prostatic hypertrophy     Cyst in hand     distal phalanx, mid finger left hand    Hyperlipidemia     Hypertension     Keratosis     right forehead    Overweight(278.02)     Seborrhea     Type II or unspecified type diabetes mellitus without mention of complication, not stated as uncontrolled       Past Surgical History:   Procedure Laterality Date    COLONOSCOPY N/A 2020    diffuse severe diverticulosis, TA, hyperplastic polyp.   at [facility]       Family History   Problem Relation Age of Onset    Diabetes Mother     Cancer Father         throat cancer    Stroke Father     Cancer Brother 64        pancreatic    Cancer Paternal Uncle         lung          Social History     Tobacco Use    Smoking status: Former Smoker     Packs/day: 3.00     Years: 20.00     Pack years: 60.00     Types: Cigarettes     Quit date: 2000     Years since quittin.1    Smokeless tobacco: Never Used   Substance Use Topics    Alcohol use: Yes     Comment: no alcohol ingestion after a heavier intake eearlier in his life         Current Outpatient Medications   Medication Sig Dispense Refill    metFORMIN (GLUCOPHAGE-XR) 500 MG extended release tablet TAKE 2 TABLETS BY MOUTH TWO TIMES A  tablet 3    lisinopril (PRINIVIL;ZESTRIL) 10 MG tablet TAKE 1 TABLET BY MOUTH ONE TIME A DAY  90 tablet 3    rosuvastatin (CRESTOR) 10 MG tablet Take 1 tablet by mouth daily 90 tablet 3    blood glucose test strips (ACCU-CHEK BYRON PLUS) strip TEST TWO TIMES DAILY FOR IRREGULAR BLOOD GLUCOSE 100 strip 5  Blood Glucose Monitoring Suppl (ONE TOUCH ULTRA 2) by Does not apply route. Tests blood sugar once a day       No current facility-administered medications for this visit. No Known Allergies    Health Maintenance   Topic Date Due    Hepatitis C screen  1950    DTaP/Tdap/Td vaccine (1 - Tdap) 06/23/1970    Shingles Vaccine (1 of 2) 06/23/2001    Pneumococcal 65+ years Vaccine (1 of 1 - PPSV23) 06/23/2016    Annual Wellness Visit (AWV)  05/29/2019    Diabetic foot exam  01/27/2021    Diabetic microalbuminuria test  07/23/2021    Lipid screen  07/23/2021    Potassium monitoring  07/23/2021    Creatinine monitoring  07/23/2021    Diabetic retinal exam  01/09/2022    A1C test (Diabetic or Prediabetic)  01/22/2022    Colon cancer screen colonoscopy  09/14/2025    Flu vaccine  Completed    AAA screen  Completed    Hepatitis A vaccine  Aged Out    Hib vaccine  Aged Out    Meningococcal (ACWY) vaccine  Aged Out       Subjective:      Review of Systems   Constitutional: Negative for chills and fever. HENT: Negative for hearing loss. Eyes: Negative for pain and visual disturbance. Respiratory: Negative for cough and shortness of breath. Cardiovascular: Negative for chest pain, palpitations and leg swelling. Gastrointestinal: Negative for abdominal pain, blood in stool, constipation, diarrhea, nausea and vomiting. Endocrine: Negative for cold intolerance, polydipsia and polyuria. Genitourinary: Negative for difficulty urinating, dysuria and hematuria. Musculoskeletal: Negative for arthralgias, back pain, gait problem and neck pain. Skin: Negative for pallor and rash. Neurological: Negative for dizziness, weakness, numbness and headaches. Hematological: Negative for adenopathy. Does not bruise/bleed easily. Psychiatric/Behavioral: Negative for confusion. The patient is not nervous/anxious. Objective:     Physical Exam  Vitals signs reviewed.    Constitutional: Appearance: He is well-developed. HENT:      Head: Normocephalic and atraumatic. Eyes:      Pupils: Pupils are equal, round, and reactive to light. Neck:      Musculoskeletal: Neck supple. Cardiovascular:      Rate and Rhythm: Normal rate and regular rhythm. Heart sounds: No murmur. No friction rub. No gallop. Pulmonary:      Effort: Pulmonary effort is normal.      Breath sounds: Normal breath sounds. No wheezing or rales. Abdominal:      General: There is no distension. Palpations: Abdomen is soft. There is no mass. Tenderness: There is no abdominal tenderness. There is no rebound. Musculoskeletal: Normal range of motion. Lymphadenopathy:      Cervical: No cervical adenopathy. Skin:     General: Skin is warm and dry. Findings: No rash. Neurological:      Mental Status: He is alert and oriented to person, place, and time. Cranial Nerves: No cranial nerve deficit (grossly). Comments: Diabetic foot exam shows slight decrease sensation in the toes bilaterally but normal sensation in the midfoot and elsewhere per monofilament sensory test.  Intact vibration sense and intact pulses bilaterally   Psychiatric:         Thought Content: Thought content normal.        /80 (Site: Right Upper Arm, Position: Sitting, Cuff Size: Medium Adult)   Pulse 66   Resp 16   Ht 6' (1.829 m)   Wt 204 lb (92.5 kg)   BMI 27.67 kg/m²     Assessment:       Diagnosis Orders   1. Routine general medical examination at a health care facility  Comprehensive Metabolic Panel, Fasting   2. Essential hypertension  Comprehensive Metabolic Panel, Fasting   3. Type 2 diabetes mellitus without complication, without long-term current use of insulin (HCC)  Microalbumin, Ur   4. Other hyperlipidemia  Lipid Panel   5. Medicare annual wellness visit, subsequent     6.  Iron deficiency anemia, unspecified iron deficiency anemia type  Hemoglobin    Iron And TIBC 7. Special screening for malignant neoplasm of prostate  PSA Screening   Test results for this appointment. 1/22/2021 normal hemoglobin 13.8.    1/22/2021 normal iron 122.    2221 okay hemoglobin A1c at 6.7. Patient was told to continue his Metformin Crestor and Zestril. Plan:       Return in about 6 months (around 7/29/2021) for Hypertension, Hyperlipidemia, Diabetes. Orders Placed This Encounter   Procedures    Comprehensive Metabolic Panel, Fasting     Standing Status:   Future     Standing Expiration Date:   1/29/2022    Lipid Panel     Standing Status:   Future     Standing Expiration Date:   1/29/2022     Order Specific Question:   Is Patient Fasting?/# of Hours     Answer:   yes    PSA Screening     Standing Status:   Future     Standing Expiration Date:   1/29/2022   Denver Holcomb     Standing Status:   Future     Standing Expiration Date:   1/29/2022    Hemoglobin     Standing Status:   Future     Standing Expiration Date:   1/29/2022    Iron And TIBC     Standing Status:   Future     Standing Expiration Date:   1/29/2022     Order Specific Question:   Is Patient Fasting? Answer:   na     Order Specific Question:   No of Hours? Answer:   na     No orders of the defined types were placed in this encounter. Patientgiven educational materials - see patient instructions. Discussed use, benefit,and side effects of prescribed medications. All patient questions answered. Ptvoiced understanding. Reviewed health maintenance. Instructed to continue currentmedications, diet and exercise. Patient agreed with treatment plan. Follow up asdirected.      Electronically signed by Filippo Joshi MD on 1/29/2021 at 9:30 AM

## 2021-01-29 NOTE — PROGRESS NOTES
Medicare Annual Wellness Visit  Name: Leif Costello Date: 2021   MRN: [id number] Sex: Male   Age: 72 y.o. Ethnicity: Non-/Non    : 1950 Race: Juan Jose Gonzalez is here for Medicare AWV (6 month), Hypertension, Hyperlipidemia, and Diabetes    Screenings for behavioral, psychosocial and functional/safety risks, and cognitive dysfunction are all negative except as indicated below. These results, as well as other patient data from the [address] form, are documented in Flowsheets linked to this Encounter. No Known Allergies      Prior to Visit Medications    Medication Sig Taking? Authorizing Provider   metFORMIN (GLUCOPHAGE-XR) 500 MG extended release tablet TAKE 2 TABLETS BY MOUTH TWO TIMES A DAY Yes Dylon Langley MD   lisinopril (PRINIVIL;ZESTRIL) 10 MG tablet TAKE 1 TABLET BY MOUTH ONE TIME A DAY  Yes Dylon Langley MD   rosuvastatin (CRESTOR) 10 MG tablet Take 1 tablet by mouth daily Yes Dylon Langley MD   blood glucose test strips (ACCU-CHEK BYRON PLUS) strip TEST TWO TIMES DAILY FOR IRREGULAR BLOOD GLUCOSE  Dylon Langley MD   Blood Glucose Monitoring Suppl (ONE TOUCH ULTRA 2) by Does not apply route. Tests blood sugar once a day  Historical Provider, MD         Past Medical History:   Diagnosis Date    Anemia     minimal anemia, hemoglobin 13.6    Benign prostatic hypertrophy     Cyst in hand     distal phalanx, mid finger left hand    Hyperlipidemia     Hypertension     Keratosis     right forehead    Overweight(278.02)     Seborrhea     Type II or unspecified type diabetes mellitus without mention of complication, not stated as uncontrolled        Past Surgical History:   Procedure Laterality Date    COLONOSCOPY N/A 2020    diffuse severe diverticulosis, TA, hyperplastic polyp.   [facility]         Family History   Problem Relation Age of Onset    Diabetes Mother     Cancer Father         throat cancer  Stroke Father     Cancer Brother 64        pancreatic    Cancer Paternal Uncle         lung       CareTeam (Including outside providers/suppliers regularly involved in providing care):   Patient Care Team:  Lane Lopez MD as PCP - General (Internal Medicine)  Lane Lopez MD as PCP - REHABILITATION [facility] Empaneled Provider  AXEL Zabala - CNP as Nurse Practitioner (Nurse Practitioner)    Wt Readings from Last 3 Encounters:   01/29/21 204 lb (92.5 kg)   12/03/20 197 lb 6.4 oz (89.5 kg)   11/27/20 202 lb (91.6 kg)     Vitals:    01/29/21 0854   BP: 128/80   Site: Right Upper Arm   Position: Sitting   Cuff Size: Medium Adult   Pulse: 66   Resp: 16   Weight: 204 lb (92.5 kg)   Height: 6' (1.829 m)     Body mass index is 27.67 kg/m². Based upon direct observation of the patient, evaluation of cognition reveals none. Patient's complete Health Risk Assessment and screening values have been reviewed and are found in Flowsheets. The following problems were reviewed today and where indicated follow up appointments were made and/or referrals ordered. Positive Risk Factor Screenings with Interventions:            General Health and ACP:  General  In general, how would you say your health is?: Good  In the past 7 days, have you experienced any of the following?  New or Increased Pain, New or Increased Fatigue, Loneliness, Social Isolation, Stress or Anger?: None of These  Do you get the social and emotional support that you need?: Yes  Do you have a Living Will?: Yes  Advance Directives     Power of [address] [place] [address] Will ACP-Advance Directive ACP-Power of     Not on File Not on File Not on File Not on File      General Health Risk Interventions:  · na    Health Habits/Nutrition:  Health Habits/Nutrition  Do you exercise for at least 20 minutes 2-3 times per week?: Yes  Have you lost any weight without trying in the past 3 months?: No  Do you eat fewer than 2 meals per day?: No Have you seen a dentist within the past year?: (!) No  Body mass index: (!) 27.66  Health Habits/Nutrition Interventions:  · Dental exam overdue:  declines dental exam at this time. natural teeth  · . Safety:  Safety  Do you have working smoke detectors?: Yes  Have all throw rugs been removed or fastened?: Yes  Do you have non-slip mats or surfaces in all bathtubs/showers?: (!) No  Do all of your stairways have a railing or banister?: Yes  Are your doorways, halls and stairs free of clutter?: Yes  Do you always fasten your seatbelt when you are in a car?: Yes  Safety Interventions:  · Home safety tips provided  · has a brand new shower that has non skid in the bottom of it     Personalized Preventive Plan   Current Health Maintenance Status  Immunization History   Administered Date(s) Administered    Hepatitis B 12/17/1999    Influenza Virus Vaccine 11/26/2007, 11/24/2008, 12/17/2012, 11/22/2018, 08/22/2020    Td, unspecified formulation 12/17/1999        Health Maintenance   Topic Date Due    Hepatitis C screen  1950    DTaP/Tdap/Td vaccine (1 - Tdap) 06/23/1970    Shingles Vaccine (1 of 2) 06/23/2001    Pneumococcal 65+ years Vaccine (1 of 1 - PPSV23) 06/23/2016    Annual Wellness Visit (AWV)  05/29/2019    Diabetic foot exam  01/27/2021    Diabetic microalbuminuria test  07/23/2021    Lipid screen  07/23/2021    Potassium monitoring  07/23/2021    Creatinine monitoring  07/23/2021    Diabetic retinal exam  01/09/2022    A1C test (Diabetic or Prediabetic)  01/22/2022    Colon cancer screen colonoscopy  09/14/2025    Flu vaccine  Completed    AAA screen  Completed    Hepatitis A vaccine  Aged Out    Hib vaccine  Aged Out    Meningococcal (ACWY) vaccine  Aged Out     Recommendations for [employer] Due: see orders and patient instructions/AVS.  .   Recommended screening schedule for the next 5-10 years is provided to the patient in written form: see Patient Instructions/AVS. Aubrey Morales LPN, 2/01/5403, performed the documented evaluation under the direct supervision of the attending physician. I, Dr. Alyson Lynn, directly supervised the performance of this Medicare annual wellness visit.

## 2021-02-04 ENCOUNTER — OFFICE VISIT (OUTPATIENT)
Dept: SURGERY | Age: 71
End: 2021-02-04
Payer: MEDICARE

## 2021-02-04 VITALS
HEIGHT: 72 IN | WEIGHT: 200.2 LBS | TEMPERATURE: 97.1 F | DIASTOLIC BLOOD PRESSURE: 80 MMHG | RESPIRATION RATE: 18 BRPM | HEART RATE: 58 BPM | BODY MASS INDEX: 27.12 KG/M2 | SYSTOLIC BLOOD PRESSURE: 130 MMHG

## 2021-02-04 DIAGNOSIS — K57.92 DIVERTICULITIS: Primary | ICD-10-CM

## 2021-02-04 PROCEDURE — 99213 OFFICE O/P EST LOW 20 MIN: CPT | Performed by: SURGERY

## 2021-02-04 PROCEDURE — 3017F COLORECTAL CA SCREEN DOC REV: CPT | Performed by: SURGERY

## 2021-02-04 PROCEDURE — 4040F PNEUMOC VAC/ADMIN/RCVD: CPT | Performed by: SURGERY

## 2021-02-04 PROCEDURE — 99212 OFFICE O/P EST SF 10 MIN: CPT

## 2021-02-04 PROCEDURE — 1036F TOBACCO NON-USER: CPT | Performed by: SURGERY

## 2021-02-04 PROCEDURE — 1123F ACP DISCUSS/DSCN MKR DOCD: CPT | Performed by: SURGERY

## 2021-02-04 PROCEDURE — G8427 DOCREV CUR MEDS BY ELIG CLIN: HCPCS | Performed by: SURGERY

## 2021-02-04 PROCEDURE — G8417 CALC BMI ABV UP PARAM F/U: HCPCS | Performed by: SURGERY

## 2021-02-04 PROCEDURE — G8484 FLU IMMUNIZE NO ADMIN: HCPCS | Performed by: SURGERY

## 2021-02-10 ENCOUNTER — OFFICE VISIT (OUTPATIENT)
Dept: PRIMARY CARE CLINIC | Age: 71
End: 2021-02-10
Payer: MEDICARE

## 2021-02-10 VITALS
SYSTOLIC BLOOD PRESSURE: 132 MMHG | RESPIRATION RATE: 18 BRPM | TEMPERATURE: 96.6 F | HEART RATE: 57 BPM | OXYGEN SATURATION: 99 % | DIASTOLIC BLOOD PRESSURE: 84 MMHG

## 2021-02-10 DIAGNOSIS — S61.215A LACERATION OF LEFT RING FINGER WITHOUT FOREIGN BODY WITHOUT DAMAGE TO NAIL, INITIAL ENCOUNTER: Primary | ICD-10-CM

## 2021-02-10 DIAGNOSIS — Z23 NEED FOR VACCINATION: ICD-10-CM

## 2021-02-10 PROCEDURE — 3017F COLORECTAL CA SCREEN DOC REV: CPT | Performed by: FAMILY MEDICINE

## 2021-02-10 PROCEDURE — 99213 OFFICE O/P EST LOW 20 MIN: CPT | Performed by: FAMILY MEDICINE

## 2021-02-10 PROCEDURE — G8417 CALC BMI ABV UP PARAM F/U: HCPCS | Performed by: FAMILY MEDICINE

## 2021-02-10 PROCEDURE — 4040F PNEUMOC VAC/ADMIN/RCVD: CPT | Performed by: FAMILY MEDICINE

## 2021-02-10 PROCEDURE — 12002 RPR S/N/AX/GEN/TRNK2.6-7.5CM: CPT | Performed by: FAMILY MEDICINE

## 2021-02-10 PROCEDURE — G8484 FLU IMMUNIZE NO ADMIN: HCPCS | Performed by: FAMILY MEDICINE

## 2021-02-10 PROCEDURE — 1036F TOBACCO NON-USER: CPT | Performed by: FAMILY MEDICINE

## 2021-02-10 PROCEDURE — 1123F ACP DISCUSS/DSCN MKR DOCD: CPT | Performed by: FAMILY MEDICINE

## 2021-02-10 PROCEDURE — 90715 TDAP VACCINE 7 YRS/> IM: CPT | Performed by: FAMILY MEDICINE

## 2021-02-10 PROCEDURE — G8427 DOCREV CUR MEDS BY ELIG CLIN: HCPCS | Performed by: FAMILY MEDICINE

## 2021-02-10 ASSESSMENT — ENCOUNTER SYMPTOMS: COLOR CHANGE: 0

## 2021-02-11 NOTE — PROGRESS NOTES
2/10/2021     Lang Yeung (:  1950) is a 72 y.o. male, here for evaluation of the following medical concerns:    Laceration   The incident occurred less than 1 hour ago (got finger caught in an auger from a salt ). The laceration is located on the left hand (left ring finger). The laceration is 3 cm (flap laceration 3 cm in length) in size. The laceration mechanism was a metal edge. The pain is moderate. The pain has been constant since onset. He reports no foreign bodies present. His tetanus status is out of date. Did review patient's med list, allergies, social history,pmhx and pshx today as noted in the record. Review of Systems   Constitutional: Negative for chills, fatigue and fever. Musculoskeletal: Negative. Skin: Positive for wound. Negative for color change, pallor and rash. Prior to Visit Medications    Medication Sig Taking? Authorizing Provider   Tetanus-Diphth-Acell Pertussis (BOOSTRIX) 5-2.5-18.5 LF-MCG/0.5 injection Inject 0.5 mLs into the muscle once for 1 dose Yes Melonie Gupta DO   metFORMIN (GLUCOPHAGE-XR) 500 MG extended release tablet TAKE 2 TABLETS BY MOUTH TWO TIMES A DAY Yes Irish Maddox MD   lisinopril (PRINIVIL;ZESTRIL) 10 MG tablet TAKE 1 TABLET BY MOUTH ONE TIME A DAY  Yes Irish Maddox MD   rosuvastatin (CRESTOR) 10 MG tablet Take 1 tablet by mouth daily Yes Irish Maddox MD   blood glucose test strips (ACCU-CHEK BYRON PLUS) strip TEST TWO TIMES DAILY FOR IRREGULAR BLOOD GLUCOSE Yes Irish Maddox MD   Blood Glucose Monitoring Suppl (ONE TOUCH ULTRA 2) by Does not apply route. Tests blood sugar once a day Yes Historical Provider, MD        Social History     Tobacco Use    Smoking status: Former Smoker     Packs/day: 3.00     Years: 20.00     Pack years: 60.00     Types: Cigarettes     Quit date: 2000     Years since quittin.1    Smokeless tobacco: Never Used   Substance Use Topics    Alcohol use:  Yes Procedure: The patient was placed in the appropriate position and anesthesia around the laceration was obtained by infiltration using 3.0 cc of 1% Lidocaine without epinephrine. The area was then cleansed using chlorhexidine. The laceration was closed with 4-0 Ethilon using interrupted sutures. There were no additional lacerations requiring repair. The wound area was then dressed with bacitracin and a sterile dressing. Total repaired wound length: 3 cm. Other Items: Suture count: 9    The patient tolerated the procedure well. Complications: None    Patient is to keep wound clean and dry. Should change dressing daily. Can clean with warm water and soap. Should use antibiotic ointment on the wound. Tylenol/Motrin prn      Orders Placed This Encounter   Procedures    Tdap (age 6y and older) IM ([address] [place] [address] Extension)    56817 - [place] REPR SUP NPTERF WND BODY 2.6-7.5       Tdap given today. Return  if no improvement in symptoms or if any further symptoms arise. No follow-ups on file. An electronic signature was used to authenticate this note.     --Loida Mehta DO on 2/10/2021 at 7:20 PM

## 2021-02-19 ENCOUNTER — OFFICE VISIT (OUTPATIENT)
Dept: PRIMARY CARE CLINIC | Age: 71
End: 2021-02-19
Payer: MEDICARE

## 2021-02-19 VITALS
DIASTOLIC BLOOD PRESSURE: 80 MMHG | OXYGEN SATURATION: 98 % | WEIGHT: 199 LBS | HEART RATE: 74 BPM | SYSTOLIC BLOOD PRESSURE: 128 MMHG | TEMPERATURE: 98 F | BODY MASS INDEX: 26.99 KG/M2

## 2021-02-19 DIAGNOSIS — Z48.02 VISIT FOR SUTURE REMOVAL: Primary | ICD-10-CM

## 2021-02-19 PROCEDURE — 99212 OFFICE O/P EST SF 10 MIN: CPT

## 2021-02-19 PROCEDURE — 99024 POSTOP FOLLOW-UP VISIT: CPT | Performed by: FAMILY MEDICINE

## 2021-02-19 NOTE — PROGRESS NOTES
Patient seen today for suture removal.  Patient seen by myself last Wednesday February 10. Had sutures placed in left ring finger at that time. Wound has healed appropriately. No issues with drainage or erythema to the wound. Sutures are removed today without difficulty. Patient tolerated this well.

## 2021-03-25 LAB — DIABETIC RETINOPATHY: NEGATIVE

## 2021-04-29 ENCOUNTER — HOSPITAL ENCOUNTER (OUTPATIENT)
Dept: LAB | Age: 71
Discharge: HOME | End: 2021-04-29
Payer: MEDICARE

## 2021-04-29 DIAGNOSIS — E11.9 TYPE 2 DIABETES MELLITUS WITHOUT COMPLICATION, WITHOUT LONG-TERM CURRENT USE OF INSULIN (HCC): ICD-10-CM

## 2021-04-29 LAB
ESTIMATED AVERAGE GLUCOSE: 134 MG/DL
HBA1C MFR BLD: 6.3 % (ref 4–6)

## 2021-04-29 PROCEDURE — 36415 COLL VENOUS BLD VENIPUNCTURE: CPT

## 2021-04-29 PROCEDURE — 83036 HEMOGLOBIN GLYCOSYLATED A1C: CPT

## 2021-05-06 ENCOUNTER — OFFICE VISIT (OUTPATIENT)
Dept: DIABETES SERVICES | Age: 71
End: 2021-05-06
Payer: MEDICARE

## 2021-05-06 VITALS
HEIGHT: 72 IN | DIASTOLIC BLOOD PRESSURE: 72 MMHG | SYSTOLIC BLOOD PRESSURE: 150 MMHG | HEART RATE: 64 BPM | RESPIRATION RATE: 16 BRPM | BODY MASS INDEX: 28.17 KG/M2 | WEIGHT: 208 LBS

## 2021-05-06 DIAGNOSIS — E11.9 TYPE 2 DIABETES MELLITUS WITHOUT COMPLICATION, WITHOUT LONG-TERM CURRENT USE OF INSULIN (HCC): Primary | ICD-10-CM

## 2021-05-06 DIAGNOSIS — I10 ESSENTIAL HYPERTENSION: ICD-10-CM

## 2021-05-06 DIAGNOSIS — E78.5 HYPERLIPIDEMIA, UNSPECIFIED HYPERLIPIDEMIA TYPE: ICD-10-CM

## 2021-05-06 PROCEDURE — 1036F TOBACCO NON-USER: CPT | Performed by: NURSE PRACTITIONER

## 2021-05-06 PROCEDURE — 99212 OFFICE O/P EST SF 10 MIN: CPT | Performed by: NURSE PRACTITIONER

## 2021-05-06 PROCEDURE — 99214 OFFICE O/P EST MOD 30 MIN: CPT | Performed by: NURSE PRACTITIONER

## 2021-05-06 PROCEDURE — 3017F COLORECTAL CA SCREEN DOC REV: CPT | Performed by: NURSE PRACTITIONER

## 2021-05-06 PROCEDURE — G8417 CALC BMI ABV UP PARAM F/U: HCPCS | Performed by: NURSE PRACTITIONER

## 2021-05-06 PROCEDURE — 1123F ACP DISCUSS/DSCN MKR DOCD: CPT | Performed by: NURSE PRACTITIONER

## 2021-05-06 PROCEDURE — 3044F HG A1C LEVEL LT 7.0%: CPT | Performed by: NURSE PRACTITIONER

## 2021-05-06 PROCEDURE — 4040F PNEUMOC VAC/ADMIN/RCVD: CPT | Performed by: NURSE PRACTITIONER

## 2021-05-06 PROCEDURE — G8427 DOCREV CUR MEDS BY ELIG CLIN: HCPCS | Performed by: NURSE PRACTITIONER

## 2021-05-06 PROCEDURE — 2022F DILAT RTA XM EVC RTNOPTHY: CPT | Performed by: NURSE PRACTITIONER

## 2021-05-06 RX ORDER — ASPIRIN 81 MG/1
81 TABLET ORAL DAILY
COMMUNITY

## 2021-05-06 RX ORDER — BLOOD SUGAR DIAGNOSTIC
STRIP MISCELLANEOUS
Qty: 200 STRIP | Refills: 3 | Status: SHIPPED | OUTPATIENT
Start: 2021-05-06 | End: 2022-08-11 | Stop reason: SDUPTHER

## 2021-05-06 RX ORDER — BLOOD SUGAR DIAGNOSTIC
STRIP MISCELLANEOUS
Qty: 200 STRIP | Refills: 3 | Status: SHIPPED | OUTPATIENT
Start: 2021-05-06 | End: 2021-05-06

## 2021-05-06 ASSESSMENT — ENCOUNTER SYMPTOMS
DIARRHEA: 0
SHORTNESS OF BREATH: 0
ABDOMINAL PAIN: 0
RESPIRATORY NEGATIVE: 1

## 2021-05-06 NOTE — PROGRESS NOTES
[address], [address]  [facility] [address] [phone]        HISTORY:    Jan Looney presents today for evaluation and management of:  Chief Complaint   Patient presents with    Diabetes     6 month appt       HPI    Interval History:    Past DM Medications   Glimepiride-therapy completed  Starlix-therapy completed  Ozempic-cost    Current Diabetic Medications  Metformin 1000 mg bid    DKA episodes: 0    10/15/20   He his doing well with diet and medication compliance. He test bs once daily around 4 am with a higher than expected average of 150. He does not sleep well, snoring seems to be better with weight loss. Not interested in sleep study. He does toss and turn with shoulder pain. He does not notice a correlation with what he at the day before. He is also seen Dr. Sarah Bran for diverticulitis and hernia issues. Diet: low carb  Exercise: physically active during the day   BS testing: once daily average of 150 later day bs average 80  Issues:     05/06/21   At previous visit diabetes counseling was provided. Diet: low carb  Exercise: work and yard work  [employer] testing: once daily average 130  Issues: low blood sugars 1 per month while outside working. 50's    High cholesterol-  Takes crestor and denies any adverse effects with its use. Watches diet and exercise.      Hypertension-  Takes lisinopril and denies any adverse effects with their use. Watches diet and exercise. Denies any chest pain, dizziness or edema.         Past Medical History:   Diagnosis Date    Anemia     minimal anemia, hemoglobin 13.6    Benign prostatic hypertrophy     Cyst in hand     distal phalanx, mid finger left hand    Hyperlipidemia     Hypertension     Keratosis     right forehead    Overweight(278.02)     Seborrhea     Type II or unspecified type diabetes mellitus without mention of complication, not stated as uncontrolled      Family History   Problem Relation Age of Onset    Diabetes Mother     Cancer Father         throat cancer    Stroke Father     Cancer Brother 64        pancreatic    Cancer Paternal Uncle         lung     Social History     Tobacco Use    Smoking status: Former Smoker     Packs/day: 3.00     Years: 20.00     Pack years: 60.00     Types: Cigarettes     Quit date: 2000     Years since quittin.4    Smokeless tobacco: Never Used   Substance Use Topics    Alcohol use: Yes     Comment: no alcohol ingestion after a heavier intake eearlier in his life    Drug use: No     No Known Allergies    MEDICATIONS:  Current Outpatient Medications   Medication Sig Dispense Refill    aspirin 81 MG EC tablet Take 81 mg by mouth daily      blood glucose test strips (ACCU-CHEK BYRON PLUS) strip TEST TWO TIMES DAILY FOR IRREGULAR BLOOD GLUCOSE 200 strip 3    metFORMIN (GLUCOPHAGE-XR) 500 MG extended release tablet TAKE 2 TABLETS BY MOUTH TWO TIMES A  tablet 3    lisinopril (PRINIVIL;ZESTRIL) 10 MG tablet TAKE 1 TABLET BY MOUTH ONE TIME A DAY  90 tablet 3    rosuvastatin (CRESTOR) 10 MG tablet Take 1 tablet by mouth daily 90 tablet 3    Blood Glucose Monitoring Suppl (ONE TOUCH ULTRA 2) by Does not apply route. Tests blood sugar once a day       No current facility-administered medications for this visit. Review ofSymptoms:  Review of Systems   Constitutional: Positive for fatigue. Negative for unexpected weight change. Eyes: Negative for visual disturbance. Respiratory: Negative. Negative for shortness of breath. Cardiovascular: Negative for chest pain and leg swelling. Gastrointestinal: Negative for abdominal pain and diarrhea. Endocrine: Negative for polydipsia, polyphagia and polyuria. Genitourinary: Negative. Musculoskeletal: Negative. Skin: Negative for rash and wound. Neurological: Negative for dizziness, tremors, seizures and headaches. Psychiatric/Behavioral: Negative. Negative for confusion and decreased concentration. Theremainder of a complete 14-point review of systems is negative. Vital Signs: BP (!) 150/72   Pulse 64   Resp 16   Ht 6' (1.829 m)   Wt 208 lb (94.3 kg)   BMI 28.21 kg/m²      Wt Readings from Last 3 Encounters:   05/06/21 208 lb (94.3 kg)   02/19/21 199 lb (90.3 kg)   02/04/21 200 lb 3.2 oz (90.8 kg)     Body mass index is 28.21 kg/m². LABS:  Hemoglobin A1C   Date Value Ref Range Status   04/29/2021 6.3 (H) 4.0 - 6.0 % Final   01/22/2021 6.7 (H) 4.0 - 6.0 % Final     Lab Results   Component Value Date    LABMICR CANNOT BE CALCULATED 07/23/2020     Lab Results   Component Value Date     07/23/2020    K 4.8 07/23/2020    CL 99 07/23/2020    CO2 28 07/23/2020    BUN 20 07/23/2020    CREATININE 0.87 07/23/2020    GLUCOSE 135 (H) 07/23/2020    CALCIUM 9.4 07/23/2020    PROT 6.7 07/23/2020    LABALBU 4.2 07/23/2020    BILITOT 0.29 (L) 07/23/2020    ALKPHOS 71 07/23/2020    AST 12 07/23/2020    ALT 18 07/23/2020    LABGLOM >60 07/23/2020    GFRAA >60 07/23/2020     Lab Results   Component Value Date    CHOL 99 07/23/2020    CHOL 101 07/16/2019    CHOL 122 07/13/2018     Lab Results   Component Value Date    TRIG 91 07/23/2020    TRIG 98 07/16/2019    TRIG 282 (H) 07/13/2018     Lab Results   Component Value Date    HDL 29 (L) 07/23/2020    HDL 30 (L) 07/16/2019    HDL 28 (L) 07/13/2018     Lab Results   Component Value Date    LDLCHOLESTEROL 52 07/23/2020    LDLCHOLESTEROL 51 07/16/2019    LDLCHOLESTEROL 38 07/13/2018     Lab Results   Component Value Date    VLDL NOT REPORTED 07/23/2020    VLDL NOT REPORTED 07/16/2019    VLDL NOT REPORTED 07/13/2018     Lab Results   Component Value Date    CHOLHDLRATIO 3.4 07/23/2020    CHOLHDLRATIO 3.4 07/16/2019    CHOLHDLRATIO 4.4 07/13/2018           Physical Exam  Constitutional:       Appearance: He is well-developed.    Eyes:      Pupils: Pupils are equal, round, and reactive to light. Cardiovascular:      Rate and Rhythm: Normal rate and regular rhythm. Pulmonary:      Effort: Pulmonary effort is normal.      Breath sounds: Normal breath sounds. Skin:     General: Skin is warm and dry. Findings: No lesion (no lipohypertrophy) or rash. Neurological:      Mental Status: He is alert and oriented to person, place, and time. Sensory: No sensory deficit. Psychiatric:         Speech: Speech normal.         Behavior: Behavior normal.         Thought Content: Thought content normal.         Judgment: Judgment normal.       Diabetic foot check:Normal strength and range of motion of toes, feet, and ankles bilaterally. No joint deformity. No cyanosis or clubbing. 0% sensation at all 22 test points with the 10 gram filament. Dorsalis pedis pulses intactbilaterally. Capillary refill at the toes was less than 2 seconds. Vibratory sensation (with 128 Hz tuning fork) intact bilaterally. Light touch sensation intact bilaterally. Hair growth present on feet and toes bilaterally. No skin breakdown, erythema, rub spots, blisters, scaling, or ulcers. Callus noted to lateral aspect of great toe. Toenails thin and not ingrown. No evidence of fungal infection. Left great toe has discoloration from toe injury 2 years ago. ASSESSMENT/PLAN:     Diagnosis Orders   1. Type 2 diabetes mellitus without complication, without long-term current use of insulin ([facility])   DIABETES FOOT EXAM    blood glucose test strips (ACCU-CHEK BYRON PLUS) strip    DISCONTINUED: blood glucose test strips (ACCU-CHEK BYRON PLUS) strip   2. Hyperlipidemia, unspecified hyperlipidemia type     3.  Essential hypertension       Orders Placed This Encounter   Procedures     DIABETES FOOT EXAM     Orders Placed This Encounter   Medications    DISCONTD: blood glucose test strips (ACCU-CHEK BYRON PLUS) strip     Sig: TEST TWO TIMES DAILY FOR IRREGULAR BLOOD GLUCOSE     Dispense:  200 strip     Refill:  3     One Touch Ultra trest strips    blood glucose test strips (ACCU-CHEK BYRON PLUS) strip     Sig: TEST TWO TIMES DAILY FOR IRREGULAR BLOOD GLUCOSE     Dispense:  200 strip     Refill:  3     One Touch Ultra trest strips     Requested Prescriptions     Signed Prescriptions Disp Refills    blood glucose test strips (ACCU-CHEK BYRON PLUS) strip 200 strip 3     Sig: TEST TWO TIMES DAILY FOR IRREGULAR BLOOD GLUCOSE       1. Type 2 diabetes mellitus without complication, without long-term current use of insulin ([facility])  - Stable  HbA1C goal is less than 7%. - Fasting blood glucose goal is 70-130mg/dl and postprandial blood sugar goal is less than 180 mg/dl. -Diabetic foot exam up-to-date: Yes  -Diabetic retinal exam up-to-date: Yes  - Labs reviewed includes: Most recent A1C 6.3%, Microalb/Crt. Ratio 0 mcg/mg creat and GFR >60 mL/min. Repeat labs due in 3 months.    -We discussed in great detail dietary modifications they can make to better improve their blood sugars. -follow up diabetes education completed, all questions answered.  -continue current treatment. Monitor for hypoglcyemia. Discussed signs and symptoms of hyper/hypoglycemia and how to treat. Encouraged 150 minutes of physical activity per week. Follow a low carbohydrate diet. Encouraged at least 7 hours of sleep. The patient was informed of the goals of diabetes management. This can only be accomplished by watching their diet and exercise levels. We certainly use medicines to help attain these goals. The consequences of not controlling blood sugars were discussed. These include blindness, heart disease, stroke, kidney disease, and possibly need for dialysis. They were told to be careful with their foot care as diabetics often have nerve damage, infections and risk for limb amutations . They also need a dilated eye exam yearly.  We discussed the issues of diet, exercise, medication, complication avoidance, reviewed the signs and symptoms of diabetes, hypoglycemic episodes, significance of HbA1C.       -  DIABETES FOOT EXAM  - blood glucose test strips (ACCU-CHEK BYRON PLUS) strip; TEST TWO TIMES DAILY FOR IRREGULAR BLOOD GLUCOSE  Dispense: 200 strip; Refill: 3    2. Hyperlipidemia, unspecified hyperlipidemia type  stable, lipid panel reviewed, continue current medications. Diet and exercise      3. Essential hypertension   stable, continue current medications. Diet and exercise Seek emergent care if chest pain develops. Will monitor. Answered all patient questions. Agrees to follow plan of care and to follow up in 6 months, sooner if needed. Call office if unexplained blood sugars less than 70 occur or above 400. Call office or access [employer] with any further questions or concerns. Be sure to bring glucometer/food log at next appointment. Total time spent reviewing chart, labs, counseling patient and documenting on the date of the encounter: 30 min.       Electronically signed by AXEL Appiah CNP on 5/6/2021 at 8:19 AM      (Please note that portions of this note were completed with a voice-recognition program. Efforts were made to edit the dictation but occasionally words are mis-transcribed.)

## 2021-07-26 ENCOUNTER — HOSPITAL ENCOUNTER (OUTPATIENT)
Dept: LAB | Age: 71
Discharge: HOME | End: 2021-07-26
Payer: MEDICARE

## 2021-07-26 DIAGNOSIS — D50.9 IRON DEFICIENCY ANEMIA, UNSPECIFIED IRON DEFICIENCY ANEMIA TYPE: ICD-10-CM

## 2021-07-26 DIAGNOSIS — I10 ESSENTIAL HYPERTENSION: ICD-10-CM

## 2021-07-26 DIAGNOSIS — Z00.00 ROUTINE GENERAL MEDICAL EXAMINATION AT A HEALTH CARE FACILITY: ICD-10-CM

## 2021-07-26 DIAGNOSIS — E11.9 TYPE 2 DIABETES MELLITUS WITHOUT COMPLICATION, WITHOUT LONG-TERM CURRENT USE OF INSULIN (HCC): ICD-10-CM

## 2021-07-26 DIAGNOSIS — E78.49 OTHER HYPERLIPIDEMIA: ICD-10-CM

## 2021-07-26 DIAGNOSIS — Z12.5 SPECIAL SCREENING FOR MALIGNANT NEOPLASM OF PROSTATE: ICD-10-CM

## 2021-07-26 LAB
ALBUMIN SERPL-MCNC: 4.3 G/DL (ref 3.5–5.2)
ALBUMIN/GLOBULIN RATIO: 2 (ref 1–2.5)
ALP BLD-CCNC: 56 U/L (ref 40–129)
ALT SERPL-CCNC: 17 U/L (ref 5–41)
ANION GAP SERPL CALCULATED.3IONS-SCNC: 8 MMOL/L (ref 9–17)
AST SERPL-CCNC: 17 U/L
BILIRUB SERPL-MCNC: 0.81 MG/DL (ref 0.3–1.2)
BUN BLDV-MCNC: 13 MG/DL (ref 8–23)
BUN/CREAT BLD: 15 (ref 9–20)
CALCIUM SERPL-MCNC: 9.1 MG/DL (ref 8.6–10.4)
CHLORIDE BLD-SCNC: 103 MMOL/L (ref 98–107)
CO2: 27 MMOL/L (ref 20–31)
CREAT SERPL-MCNC: 0.89 MG/DL (ref 0.7–1.2)
GFR AFRICAN AMERICAN: >60 ML/MIN
GFR NON-AFRICAN AMERICAN: >60 ML/MIN
GFR SERPL CREATININE-BSD FRML MDRD: ABNORMAL ML/MIN/{1.73_M2}
GFR SERPL CREATININE-BSD FRML MDRD: ABNORMAL ML/MIN/{1.73_M2}
GLUCOSE FASTING: 123 MG/DL (ref 70–99)
HEMOGLOBIN: 13 G/DL (ref 13–17)
POTASSIUM SERPL-SCNC: 4.4 MMOL/L (ref 3.7–5.3)
SODIUM BLD-SCNC: 138 MMOL/L (ref 135–144)
TOTAL PROTEIN: 6.5 G/DL (ref 6.4–8.3)

## 2021-07-26 PROCEDURE — 80053 COMPREHEN METABOLIC PANEL: CPT

## 2021-07-26 PROCEDURE — 36415 COLL VENOUS BLD VENIPUNCTURE: CPT

## 2021-07-26 PROCEDURE — 83550 IRON BINDING TEST: CPT

## 2021-07-26 PROCEDURE — 83540 ASSAY OF IRON: CPT

## 2021-07-26 PROCEDURE — G0103 PSA SCREENING: HCPCS

## 2021-07-26 PROCEDURE — 85018 HEMOGLOBIN: CPT

## 2021-07-26 PROCEDURE — 82043 UR ALBUMIN QUANTITATIVE: CPT

## 2021-07-26 PROCEDURE — 82570 ASSAY OF URINE CREATININE: CPT

## 2021-07-26 PROCEDURE — 80061 LIPID PANEL: CPT

## 2021-07-27 LAB
CHOLESTEROL/HDL RATIO: 2.4
CHOLESTEROL: 93 MG/DL
CREATININE URINE: 25.2 MG/DL (ref 39–259)
HDLC SERPL-MCNC: 38 MG/DL
IRON SATURATION: 46 % (ref 20–55)
IRON: 116 UG/DL (ref 59–158)
LDL CHOLESTEROL: 36 MG/DL (ref 0–130)
MICROALBUMIN/CREAT 24H UR: <12 MG/L
MICROALBUMIN/CREAT UR-RTO: ABNORMAL MCG/MG CREAT
PROSTATE SPECIFIC ANTIGEN: 1.06 UG/L
TOTAL IRON BINDING CAPACITY: 250 UG/DL (ref 250–450)
TRIGL SERPL-MCNC: 93 MG/DL
UNSATURATED IRON BINDING CAPACITY: 134 UG/DL (ref 112–347)
VLDLC SERPL CALC-MCNC: ABNORMAL MG/DL (ref 1–30)

## 2021-07-29 ENCOUNTER — OFFICE VISIT (OUTPATIENT)
Dept: INTERNAL MEDICINE | Age: 71
End: 2021-07-29
Payer: MEDICARE

## 2021-07-29 VITALS
HEART RATE: 64 BPM | BODY MASS INDEX: 27.9 KG/M2 | SYSTOLIC BLOOD PRESSURE: 132 MMHG | DIASTOLIC BLOOD PRESSURE: 70 MMHG | HEIGHT: 72 IN | WEIGHT: 206 LBS | RESPIRATION RATE: 16 BRPM

## 2021-07-29 DIAGNOSIS — I10 ESSENTIAL HYPERTENSION: Primary | ICD-10-CM

## 2021-07-29 DIAGNOSIS — E78.49 OTHER HYPERLIPIDEMIA: ICD-10-CM

## 2021-07-29 DIAGNOSIS — E11.9 TYPE 2 DIABETES MELLITUS WITHOUT COMPLICATION, WITHOUT LONG-TERM CURRENT USE OF INSULIN (HCC): ICD-10-CM

## 2021-07-29 DIAGNOSIS — D50.9 IRON DEFICIENCY ANEMIA, UNSPECIFIED IRON DEFICIENCY ANEMIA TYPE: ICD-10-CM

## 2021-07-29 DIAGNOSIS — K55.9 SMALL BOWEL ISCHEMIA (HCC): ICD-10-CM

## 2021-07-29 PROCEDURE — G8417 CALC BMI ABV UP PARAM F/U: HCPCS | Performed by: INTERNAL MEDICINE

## 2021-07-29 PROCEDURE — 99214 OFFICE O/P EST MOD 30 MIN: CPT | Performed by: INTERNAL MEDICINE

## 2021-07-29 PROCEDURE — 3017F COLORECTAL CA SCREEN DOC REV: CPT | Performed by: INTERNAL MEDICINE

## 2021-07-29 PROCEDURE — 1036F TOBACCO NON-USER: CPT | Performed by: INTERNAL MEDICINE

## 2021-07-29 PROCEDURE — G8427 DOCREV CUR MEDS BY ELIG CLIN: HCPCS | Performed by: INTERNAL MEDICINE

## 2021-07-29 PROCEDURE — 2022F DILAT RTA XM EVC RTNOPTHY: CPT | Performed by: INTERNAL MEDICINE

## 2021-07-29 PROCEDURE — 4040F PNEUMOC VAC/ADMIN/RCVD: CPT | Performed by: INTERNAL MEDICINE

## 2021-07-29 PROCEDURE — 99212 OFFICE O/P EST SF 10 MIN: CPT | Performed by: INTERNAL MEDICINE

## 2021-07-29 PROCEDURE — 1123F ACP DISCUSS/DSCN MKR DOCD: CPT | Performed by: INTERNAL MEDICINE

## 2021-07-29 PROCEDURE — 3044F HG A1C LEVEL LT 7.0%: CPT | Performed by: INTERNAL MEDICINE

## 2021-07-29 RX ORDER — ROSUVASTATIN CALCIUM 10 MG/1
10 TABLET, COATED ORAL DAILY
Qty: 90 TABLET | Refills: 3 | Status: SHIPPED | OUTPATIENT
Start: 2021-07-29 | End: 2022-08-15

## 2021-07-29 SDOH — ECONOMIC STABILITY: FOOD INSECURITY: WITHIN THE PAST 12 MONTHS, YOU WORRIED THAT YOUR FOOD WOULD RUN OUT BEFORE YOU GOT MONEY TO BUY MORE.: NEVER TRUE

## 2021-07-29 SDOH — ECONOMIC STABILITY: FOOD INSECURITY: WITHIN THE PAST 12 MONTHS, THE FOOD YOU BOUGHT JUST DIDN'T LAST AND YOU DIDN'T HAVE MONEY TO GET MORE.: NEVER TRUE

## 2021-07-29 ASSESSMENT — ENCOUNTER SYMPTOMS
COUGH: 0
BACK PAIN: 0
EYE PAIN: 0
SHORTNESS OF BREATH: 0
NAUSEA: 0
ABDOMINAL PAIN: 0
BLOOD IN STOOL: 0
DIARRHEA: 0
CONSTIPATION: 0
VOMITING: 0

## 2021-07-29 ASSESSMENT — SOCIAL DETERMINANTS OF HEALTH (SDOH): HOW HARD IS IT FOR YOU TO PAY FOR THE VERY BASICS LIKE FOOD, HOUSING, MEDICAL CARE, AND HEATING?: NOT HARD AT ALL

## 2021-07-29 NOTE — PROGRESS NOTES
[place] [address]  Dept: [phone]  Dept Fax: [phone]  Loc: [phone]     Collin Arevalo is a 80 y.o. male who presents today for his medical conditions/complaintsas noted below. Collin Arevalo is c/o of   Chief Complaint   Patient presents with    Hypertension     6 month    Hyperlipidemia    Diabetes         HPI:     Hypertension  This is a chronic (essential htn) problem. The current episode started more than 1 year ago. The problem has been waxing and waning since onset. The problem is controlled. Pertinent negatives include no chest pain, headaches, neck pain, palpitations or shortness of breath. Hyperlipidemia  This is a chronic problem. The current episode started more than 1 year ago. The problem is controlled. Recent lipid tests were reviewed and are variable. Pertinent negatives include no chest pain or shortness of breath. Diabetes  He presents for his follow-up diabetic visit. He has type 2 diabetes mellitus. His disease course has been fluctuating. Pertinent negatives for hypoglycemia include no confusion, dizziness, headaches, nervousness/anxiousness or pallor. Pertinent negatives for diabetes include no chest pain, no polydipsia, no polyuria and no weakness. Other  This is a chronic (Small bowel ischemia, resolved) problem. The current episode started more than 1 month ago. The problem occurs intermittently. The problem has been resolved. Pertinent negatives include no abdominal pain, arthralgias, chest pain, chills, coughing, fever, headaches, nausea, neck pain, numbness, rash, vomiting or weakness. Hemoglobin A1C (%)   Date Value   04/29/2021 6.3 (H)   01/22/2021 6.7 (H)   10/08/2020 6.3 (H)            Microalb/Crt.  Ratio (mcg/mg creat)   Date Value   07/26/2021 CANNOT BE CALCULATED     LDL Cholesterol (mg/dL)   Date Value   07/26/2021 36   07/23/2020 52   2019 51         AST (U/L)   Date Value   2021 17     ALT (U/L)   Date Value   2021 17     BUN (mg/dL)   Date Value   2021 13     BP Readings from Last 3 Encounters:   21 132/70   21 (!) 150/72   21 128/80              Past Medical History:   Diagnosis Date    Anemia     minimal anemia, hemoglobin 13.6    Benign prostatic hypertrophy     Cyst in hand     distal phalanx, mid finger left hand    Hyperlipidemia     Hypertension     Keratosis     right forehead    Overweight(278.02)     Seborrhea     Type II or unspecified type diabetes mellitus without mention of complication, not stated as uncontrolled       Past Surgical History:   Procedure Laterality Date    COLONOSCOPY N/A 2020    diffuse severe diverticulosis, TA, hyperplastic polyp.   at [facility]       Family History   Problem Relation Age of Onset    Diabetes Mother     Cancer Father         throat cancer    Stroke Father     Cancer Brother 64        pancreatic    Cancer Paternal Uncle         lung          Social History     Tobacco Use    Smoking status: Former Smoker     Packs/day: 3.00     Years: 20.00     Pack years: 60.00     Types: Cigarettes     Quit date: 2000     Years since quittin.6    Smokeless tobacco: Never Used   Substance Use Topics    Alcohol use: Yes     Comment: no alcohol ingestion after a heavier intake eearlier in his life         Current Outpatient Medications   Medication Sig Dispense Refill    rosuvastatin (CRESTOR) 10 MG tablet Take 1 tablet by mouth daily 90 tablet 3    aspirin 81 MG EC tablet Take 81 mg by mouth daily      metFORMIN (GLUCOPHAGE-XR) 500 MG extended release tablet TAKE 2 TABLETS BY MOUTH TWO TIMES A  tablet 3    lisinopril (PRINIVIL;ZESTRIL) 10 MG tablet TAKE 1 TABLET BY MOUTH ONE TIME A DAY  90 tablet 3    blood glucose test strips (ACCU-CHEK BYRON PLUS) strip TEST TWO TIMES DAILY FOR IRREGULAR BLOOD GLUCOSE 200 strip 3    Blood Glucose Monitoring Suppl (ONE TOUCH ULTRA 2) by Does not apply route. Tests blood sugar once a day       No current facility-administered medications for this visit. No Known Allergies    Health Maintenance   Topic Date Due    Hepatitis C screen  Never done    Shingles Vaccine (1 of 2) Never done    Pneumococcal 65+ years Vaccine (1 of 1 - PPSV23) Never done    Flu vaccine (1) 09/01/2021    A1C test (Diabetic or Prediabetic)  04/29/2022    Diabetic foot exam  05/06/2022    Diabetic microalbuminuria test  07/26/2022    Lipid screen  07/26/2022    Potassium monitoring  07/26/2022    Creatinine monitoring  07/26/2022    Diabetic retinal exam  03/25/2023    Colon cancer screen colonoscopy  09/14/2025    DTaP/Tdap/Td vaccine (2 - Td or Tdap) 02/10/2031    COVID-19 Vaccine  Completed    AAA screen  Completed    Hepatitis A vaccine  Aged Out    Hib vaccine  Aged Out    Meningococcal (ACWY) vaccine  Aged Out       Subjective:      Review of Systems   Constitutional: Negative for chills and fever. HENT: Negative for hearing loss. Eyes: Negative for pain and visual disturbance. Respiratory: Negative for cough and shortness of breath. Cardiovascular: Negative for chest pain, palpitations and leg swelling. Gastrointestinal: Negative for abdominal pain, blood in stool, constipation, diarrhea, nausea and vomiting. Endocrine: Negative for cold intolerance, polydipsia and polyuria. Genitourinary: Negative for difficulty urinating, dysuria and hematuria. Musculoskeletal: Negative for arthralgias, back pain, gait problem and neck pain. Skin: Negative for pallor and rash. Neurological: Negative for dizziness, weakness, numbness and headaches. Hematological: Negative for adenopathy. Does not bruise/bleed easily. Psychiatric/Behavioral: Negative for confusion. The patient is not nervous/anxious. Objective:     Physical Exam  Vitals reviewed.    Constitutional:       Appearance: He is well-developed. HENT:      Head: Normocephalic and atraumatic. Eyes:      Pupils: Pupils are equal, round, and reactive to light. Cardiovascular:      Rate and Rhythm: Normal rate and regular rhythm. Heart sounds: No murmur heard. No friction rub. No gallop. Pulmonary:      Effort: Pulmonary effort is normal.      Breath sounds: Normal breath sounds. No wheezing or rales. Abdominal:      General: There is no distension. Palpations: Abdomen is soft. There is no mass. Tenderness: There is no abdominal tenderness. There is no rebound. Musculoskeletal:         General: Normal range of motion. Cervical back: Neck supple. Lymphadenopathy:      Cervical: No cervical adenopathy. Skin:     General: Skin is warm and dry. Findings: No rash. Neurological:      Mental Status: He is alert and oriented to person, place, and time. Cranial Nerves: No cranial nerve deficit (grossly). Psychiatric:         Thought Content: Thought content normal.        /70 (Site: Right Upper Arm, Position: Sitting, Cuff Size: Large Adult)   Pulse 64   Resp 16   Ht 6' (1.829 m)   Wt 206 lb (93.4 kg)   BMI 27.94 kg/m²     Assessment:       Diagnosis Orders   1. Essential hypertension     2. Other hyperlipidemia  rosuvastatin (CRESTOR) 10 MG tablet   3. Type 2 diabetes mellitus without complication, without long-term current use of insulin ([facility])  Hemoglobin A1C   4. Small bowel ischemia ([address]     5. Iron deficiency anemia, unspecified iron deficiency anemia type  Hemoglobin    Iron And TIBC      I reviewed multiple test results for this appointment. 7/26/2021 normal creatinine 0.89    7/26/2021 normal total cholesterol 93.    7/26/2021 good hemoglobin A1c at 6.3. Patient told to continue Metformin. Plan:       Return in about 6 months (around 1/31/2022) for medicare AWV, Hypertension, Hyperlipidemia, Diabetes.     Orders Placed This Encounter   Procedures    Hemoglobin A1C Standing Status:   Future     Standing Expiration Date:   7/29/2022    Hemoglobin     Standing Status:   Future     Standing Expiration Date:   7/29/2022    Iron And TIBC     Standing Status:   Future     Standing Expiration Date:   7/29/2022     Order Specific Question:   Is Patient Fasting? Answer:   na     Order Specific Question:   No of Hours? Answer:   na     Orders Placed This Encounter   Medications    rosuvastatin (CRESTOR) 10 MG tablet     Sig: Take 1 tablet by mouth daily     Dispense:  90 tablet     Refill:  3       Patientgiven educational materials - see patient instructions. Discussed use, benefit,and side effects of prescribed medications. All patient questions answered. Ptvoiced understanding. Reviewed health maintenance. Instructed to continue currentmedications, diet and exercise. Patient agreed with treatment plan. Follow up asdirected.      Electronically signed by Daniel Crane MD on 7/29/2021 at 9:07 AM

## 2021-08-11 ENCOUNTER — HOSPITAL ENCOUNTER (OUTPATIENT)
Dept: LAB | Age: 71
Discharge: HOME | End: 2021-08-11
Payer: MEDICARE

## 2021-08-11 ENCOUNTER — TELEPHONE (OUTPATIENT)
Dept: INTERNAL MEDICINE | Age: 71
End: 2021-08-11

## 2021-08-11 DIAGNOSIS — E11.9 TYPE 2 DIABETES MELLITUS WITHOUT COMPLICATION, WITHOUT LONG-TERM CURRENT USE OF INSULIN (HCC): ICD-10-CM

## 2021-08-11 DIAGNOSIS — E78.49 OTHER HYPERLIPIDEMIA: ICD-10-CM

## 2021-08-11 DIAGNOSIS — E11.9 TYPE 2 DIABETES MELLITUS WITHOUT COMPLICATION, WITHOUT LONG-TERM CURRENT USE OF INSULIN (HCC): Primary | ICD-10-CM

## 2021-08-11 DIAGNOSIS — I10 ESSENTIAL HYPERTENSION: ICD-10-CM

## 2021-08-11 PROCEDURE — 36415 COLL VENOUS BLD VENIPUNCTURE: CPT

## 2021-08-11 PROCEDURE — 83036 HEMOGLOBIN GLYCOSYLATED A1C: CPT

## 2021-08-11 NOTE — TELEPHONE ENCOUNTER
Patient was calling to inform you that his blood sugars are averaging between 150-170 majority of the time before meals. Taking metformin as prescribed. Tito Lindsay 27.  Next appt 11/4/21

## 2021-08-11 NOTE — TELEPHONE ENCOUNTER
Last a1c was at goal lets wait to see what the next a1c shows. Please focus on diet and exercise. Try to limit to 45 grams of carbs or less per meal. If a1c is not at goal we can discuss adding additional medications. If no improvement in the next couple weeks please call office.

## 2021-08-12 DIAGNOSIS — E11.9 TYPE 2 DIABETES MELLITUS WITHOUT COMPLICATION, WITHOUT LONG-TERM CURRENT USE OF INSULIN (HCC): Primary | ICD-10-CM

## 2021-08-12 LAB
ESTIMATED AVERAGE GLUCOSE: 140 MG/DL
HBA1C MFR BLD: 6.5 % (ref 4–6)

## 2021-10-29 ENCOUNTER — HOSPITAL ENCOUNTER (OUTPATIENT)
Dept: LAB | Age: 71
Discharge: HOME | End: 2021-10-29
Payer: MEDICARE

## 2021-10-29 DIAGNOSIS — E11.9 TYPE 2 DIABETES MELLITUS WITHOUT COMPLICATION, WITHOUT LONG-TERM CURRENT USE OF INSULIN (HCC): ICD-10-CM

## 2021-10-29 LAB
ESTIMATED AVERAGE GLUCOSE: 137 MG/DL
HBA1C MFR BLD: 6.4 % (ref 4–6)

## 2021-10-29 PROCEDURE — 83036 HEMOGLOBIN GLYCOSYLATED A1C: CPT

## 2021-10-29 PROCEDURE — 36415 COLL VENOUS BLD VENIPUNCTURE: CPT

## 2021-11-04 ENCOUNTER — OFFICE VISIT (OUTPATIENT)
Dept: DIABETES SERVICES | Age: 71
End: 2021-11-04
Payer: MEDICARE

## 2021-11-04 VITALS
SYSTOLIC BLOOD PRESSURE: 142 MMHG | HEIGHT: 72 IN | WEIGHT: 213 LBS | BODY MASS INDEX: 28.85 KG/M2 | HEART RATE: 60 BPM | RESPIRATION RATE: 14 BRPM | DIASTOLIC BLOOD PRESSURE: 70 MMHG

## 2021-11-04 DIAGNOSIS — E11.9 TYPE 2 DIABETES MELLITUS WITHOUT COMPLICATION, WITHOUT LONG-TERM CURRENT USE OF INSULIN (HCC): Primary | ICD-10-CM

## 2021-11-04 DIAGNOSIS — I10 ESSENTIAL HYPERTENSION: ICD-10-CM

## 2021-11-04 DIAGNOSIS — E78.49 OTHER HYPERLIPIDEMIA: ICD-10-CM

## 2021-11-04 PROCEDURE — 1123F ACP DISCUSS/DSCN MKR DOCD: CPT | Performed by: NURSE PRACTITIONER

## 2021-11-04 PROCEDURE — 99214 OFFICE O/P EST MOD 30 MIN: CPT | Performed by: NURSE PRACTITIONER

## 2021-11-04 PROCEDURE — 4040F PNEUMOC VAC/ADMIN/RCVD: CPT | Performed by: NURSE PRACTITIONER

## 2021-11-04 PROCEDURE — 3044F HG A1C LEVEL LT 7.0%: CPT | Performed by: NURSE PRACTITIONER

## 2021-11-04 PROCEDURE — 1036F TOBACCO NON-USER: CPT | Performed by: NURSE PRACTITIONER

## 2021-11-04 PROCEDURE — G8417 CALC BMI ABV UP PARAM F/U: HCPCS | Performed by: NURSE PRACTITIONER

## 2021-11-04 PROCEDURE — 3017F COLORECTAL CA SCREEN DOC REV: CPT | Performed by: NURSE PRACTITIONER

## 2021-11-04 PROCEDURE — G8427 DOCREV CUR MEDS BY ELIG CLIN: HCPCS | Performed by: NURSE PRACTITIONER

## 2021-11-04 PROCEDURE — 99212 OFFICE O/P EST SF 10 MIN: CPT | Performed by: NURSE PRACTITIONER

## 2021-11-04 PROCEDURE — G8484 FLU IMMUNIZE NO ADMIN: HCPCS | Performed by: NURSE PRACTITIONER

## 2021-11-04 PROCEDURE — 2022F DILAT RTA XM EVC RTNOPTHY: CPT | Performed by: NURSE PRACTITIONER

## 2021-11-04 RX ORDER — BLOOD PRESSURE TEST KIT
KIT MISCELLANEOUS
Qty: 1 KIT | Refills: 0 | Status: SHIPPED | OUTPATIENT
Start: 2021-11-04 | End: 2022-08-11

## 2021-11-04 ASSESSMENT — ENCOUNTER SYMPTOMS
DIARRHEA: 0
ABDOMINAL PAIN: 0
RESPIRATORY NEGATIVE: 1
SHORTNESS OF BREATH: 0

## 2021-11-04 NOTE — PROGRESS NOTES
[facility] [address]  [address] [facility], [address]  [facility] [id number] [phone]        HISTORY:    Jocelyn Goldstein presents today for evaluation and management of:  Chief Complaint   Patient presents with    Diabetes     6 month appt. HPI    Interval History:    Past DM Medications   Glimepiride-therapy completed  Starlix-therapy completed  Ozempic-cost     Current Diabetic Medications  Metformin 1000 mg bid     DKA episodes: 0       10/15/20   He his doing well with diet and medication compliance. He test bs once daily around 4 am with a higher than expected average of 150. He does not sleep well, snoring seems to be better with weight loss. Not interested in sleep study. He does toss and turn with shoulder pain. He does not notice a correlation with what he at the day before. He is also seen Dr. Mary Jane Meredith for diverticulitis and hernia issues.   Diet: low carb  Exercise: physically active during the day   BS testing: once daily average of 150 later day bs average 80  Issues:      05/06/21   At previous visit diabetes counseling was provided. Diet: low carb  Exercise: work and yard work  [facility] testing: once daily average 130  Issues: low blood sugars 1 per month while outside working. 50's    11/04/21   At previous visit dm counseling was provided. Denies any s/s of hyper/hypoglycemia. Blurry vision in the am discussed with eye doctor and no issues identified. Diet: mindful eating   Exercise: none  BS testing: once daily 130-150  Issues: denies   Diabetic foot exam up-to-date: Yes  Diabetic retinal exam up-to-date: Yes  Hypoglycemia as needed treatment: snack     High cholesterol-  Takes crestor and denies any adverse effects with its use.  Watches diet and exercise.      Hypertension-  Takes lisinopril and denies any adverse effects with their use. Watches diet and exercise.  Denies any chest pain, dizziness or edema.        Past Medical History:   Diagnosis Date    Anemia     minimal anemia, hemoglobin 13.6    Benign prostatic hypertrophy     Cyst in hand     distal phalanx, mid finger left hand    Hyperlipidemia     Hypertension     Keratosis     right forehead    Overweight(278.02)     Seborrhea     Type II or unspecified type diabetes mellitus without mention of complication, not stated as uncontrolled      Family History   Problem Relation Age of Onset    Diabetes Mother     Cancer Father         throat cancer    Stroke Father     Cancer Brother 64        pancreatic    Cancer Paternal Uncle         lung     Social History     Tobacco Use    Smoking status: Former Smoker     Packs/day: 3.00     Years: 20.00     Pack years: 60.00     Types: Cigarettes     Quit date: 2000     Years since quittin.9    Smokeless tobacco: Never Used   Vaping Use    Vaping Use: Never used   Substance Use Topics    Alcohol use: Yes     Comment: no alcohol ingestion after a heavier intake eearlier in his life    Drug use: No     No Known Allergies    MEDICATIONS:  Current Outpatient Medications   Medication Sig Dispense Refill    Blood Pressure KIT Standard size adult arm cuff 1 kit 0    rosuvastatin (CRESTOR) 10 MG tablet Take 1 tablet by mouth daily 90 tablet 3    aspirin 81 MG EC tablet Take 81 mg by mouth daily      metFORMIN (GLUCOPHAGE-XR) 500 MG extended release tablet TAKE 2 TABLETS BY MOUTH TWO TIMES A  tablet 3    lisinopril (PRINIVIL;ZESTRIL) 10 MG tablet TAKE 1 TABLET BY MOUTH ONE TIME A DAY  90 tablet 3    blood glucose test strips (ACCU-CHEK BYRON PLUS) strip TEST TWO TIMES DAILY FOR IRREGULAR BLOOD GLUCOSE 200 strip 3    Blood Glucose Monitoring Suppl (ONE TOUCH ULTRA 2) by Does not apply route. Tests blood sugar once a day       No current facility-administered medications for this visit. Review ofSymptoms:  Review of Systems   Constitutional: Positive for fatigue.  Negative for unexpected weight change. Eyes: Negative for visual disturbance. Respiratory: Negative. Negative for shortness of breath. Cardiovascular: Negative for chest pain and leg swelling. Gastrointestinal: Negative for abdominal pain and diarrhea. Endocrine: Negative for polydipsia, polyphagia and polyuria. Genitourinary: Negative. Musculoskeletal: Negative. Skin: Negative for rash and wound. Neurological: Negative for dizziness, tremors, seizures and headaches. Psychiatric/Behavioral: Negative. Negative for confusion and decreased concentration. Theremainder of a complete 14-point review of systems is negative. Vital Signs: BP (!) 142/70   Pulse 60   Resp 14   Ht 6' (1.829 m)   Wt 213 lb (96.6 kg)   BMI 28.89 kg/m²      Wt Readings from Last 3 Encounters:   11/04/21 213 lb (96.6 kg)   07/29/21 206 lb (93.4 kg)   05/06/21 208 lb (94.3 kg)     Body mass index is 28.89 kg/m².   LABS:  Hemoglobin A1C   Date Value Ref Range Status   10/29/2021 6.4 (H) 4.0 - 6.0 % Final   08/11/2021 6.5 (H) 4.0 - 6.0 % Final     Lab Results   Component Value Date    LABMICR CANNOT BE CALCULATED 07/26/2021     Lab Results   Component Value Date     07/26/2021    K 4.4 07/26/2021     07/26/2021    CO2 27 07/26/2021    BUN 13 07/26/2021    CREATININE 0.89 07/26/2021    GLUCOSE 135 (H) 07/23/2020    CALCIUM 9.1 07/26/2021    PROT 6.5 07/26/2021    LABALBU 4.3 07/26/2021    BILITOT 0.81 07/26/2021    ALKPHOS 56 07/26/2021    AST 17 07/26/2021    ALT 17 07/26/2021    LABGLOM >60 07/26/2021    GFRAA >60 07/26/2021     Lab Results   Component Value Date    CHOL 93 07/26/2021    CHOL 99 07/23/2020    CHOL 101 07/16/2019     Lab Results   Component Value Date    TRIG 93 07/26/2021    TRIG 91 07/23/2020    TRIG 98 07/16/2019     Lab Results   Component Value Date    HDL 38 (L) 07/26/2021    HDL 29 (L) 07/23/2020    HDL 30 (L) 07/16/2019     Lab Results   Component Value Date    LDLCHOLESTEROL 36 07/26/2021    LDLCHOLESTEROL 52 07/23/2020    LDLCHOLESTEROL 51 07/16/2019     Lab Results   Component Value Date    VLDL NOT REPORTED 07/26/2021    VLDL NOT REPORTED 07/23/2020    VLDL NOT REPORTED 07/16/2019     Lab Results   Component Value Date    CHOLHDLRATIO 2.4 07/26/2021    CHOLHDLRATIO 3.4 07/23/2020    CHOLHDLRATIO 3.4 07/16/2019           Physical Exam  Constitutional:       Appearance: He is well-developed. Eyes:      Pupils: Pupils are equal, round, and reactive to light. Cardiovascular:      Rate and Rhythm: Normal rate and regular rhythm. Pulmonary:      Effort: Pulmonary effort is normal.      Breath sounds: Normal breath sounds. Skin:     General: Skin is warm and dry. Findings: No lesion (no lipohypertrophy) or rash. Neurological:      Mental Status: He is alert and oriented to person, place, and time. Sensory: No sensory deficit. Psychiatric:         Speech: Speech normal.         Behavior: Behavior normal.         Thought Content: Thought content normal.         Judgment: Judgment normal.           ASSESSMENT/PLAN:     Diagnosis Orders   1. Type 2 diabetes mellitus without complication, without long-term current use of insulin ([facility]     2. Other hyperlipidemia     3. Essential hypertension       No orders of the defined types were placed in this encounter. Orders Placed This Encounter   Medications    Blood Pressure KIT     Sig: Standard size adult arm cuff     Dispense:  1 kit     Refill:  0     Requested Prescriptions     Signed Prescriptions Disp Refills    Blood Pressure KIT 1 kit 0     Sig: Standard size adult arm cuff       1. Type 2 diabetes mellitus without complication, without long-term current use of insulin ([facility])  - Stable  HbA1C goal is less than 7%. - Fasting blood glucose goal is 70-120mg/dl and postprandial blood sugar goal is less than 180 mg/dl. - Labs reviewed including most recent A1c, microalbumin and kidney function.  Repeat labs due in 3 months.    -We discussed in great detail dietary modifications they can make to better improve their blood sugars. -follow up diabetes education completed, all questions answered. Discussed signs and symptoms of hyper/hypoglycemia and how to treat. Encouraged 150 minutes of physical activity per week. Follow a low carbohydrate diet. Encouraged at least 7 hours of sleep. The patient was informed of the goals of diabetes management. This can only be accomplished by watching their diet and exercise levels. We certainly use medicines to help attain these goals. The consequences of not controlling blood sugars were discussed. These include blindness, heart disease, stroke, kidney disease, and possibly need for dialysis. They were told to be careful with their foot care as diabetics often have nerve damage, infections and risk for limb amutations . They also need a dilated eye exam yearly. We discussed the issues of diet, exercise, medication, complication avoidance, reviewed the signs and symptoms of diabetes, hypoglycemic episodes, significance of HbA1C.         2. Other hyperlipidemia  stable, lipid panel reviewed, continue current medications. Diet and exercise      3. Essential hypertension   abnormal, continue current medications. Diet and exercise Seek emergent care if chest pain develops. -will monitor bp at home. Answered all patient questions. Agrees to follow plan of care and to follow up in 3 months, sooner if needed. Call office if unexplained blood sugars less than 70 occur or above 400. Call office or access MyChart with any further questions or concerns. Be sure to bring glucometer/food log at next appointment. Total time spent reviewing chart, labs, counseling patient and documenting on the date of the encounter: 30 min.       Electronically signed by AXEL Angel CNP on 11/4/2021 at 8:08 AM      (Please note that portions of this note were completed with a voice-recognition program. Efforts were made to edit the dictation but occasionally words are mis-transcribed.)

## 2022-01-28 ENCOUNTER — HOSPITAL ENCOUNTER (OUTPATIENT)
Dept: LAB | Age: 72
Discharge: HOME | End: 2022-01-28
Payer: MEDICARE

## 2022-01-28 DIAGNOSIS — D50.9 IRON DEFICIENCY ANEMIA, UNSPECIFIED IRON DEFICIENCY ANEMIA TYPE: ICD-10-CM

## 2022-01-28 DIAGNOSIS — E11.9 TYPE 2 DIABETES MELLITUS WITHOUT COMPLICATION, WITHOUT LONG-TERM CURRENT USE OF INSULIN (HCC): ICD-10-CM

## 2022-01-28 LAB
ESTIMATED AVERAGE GLUCOSE: 154 MG/DL
HBA1C MFR BLD: 7 % (ref 4–6)
HEMOGLOBIN: 13 G/DL (ref 13–17)
IRON SATURATION: 31 % (ref 20–55)
IRON: 77 UG/DL (ref 59–158)
TOTAL IRON BINDING CAPACITY: 251 UG/DL (ref 250–450)
UNSATURATED IRON BINDING CAPACITY: 174 UG/DL (ref 112–347)

## 2022-01-28 PROCEDURE — 36415 COLL VENOUS BLD VENIPUNCTURE: CPT

## 2022-01-28 PROCEDURE — 85018 HEMOGLOBIN: CPT

## 2022-01-28 PROCEDURE — 83540 ASSAY OF IRON: CPT

## 2022-01-28 PROCEDURE — 83036 HEMOGLOBIN GLYCOSYLATED A1C: CPT

## 2022-01-28 PROCEDURE — 83550 IRON BINDING TEST: CPT

## 2022-02-04 ENCOUNTER — OFFICE VISIT (OUTPATIENT)
Dept: INTERNAL MEDICINE | Age: 72
End: 2022-02-04

## 2022-02-04 DIAGNOSIS — I10 PRIMARY HYPERTENSION: Primary | ICD-10-CM

## 2022-02-04 PROCEDURE — 99999 PR OFFICE/OUTPT VISIT,PROCEDURE ONLY: CPT | Performed by: INTERNAL MEDICINE

## 2022-02-04 ASSESSMENT — LIFESTYLE VARIABLES
HOW MANY STANDARD DRINKS CONTAINING ALCOHOL DO YOU HAVE ON A TYPICAL DAY: ONE OR TWO
HOW OFTEN DURING THE LAST YEAR HAVE YOU HAD A FEELING OF GUILT OR REMORSE AFTER DRINKING: NEVER
HOW OFTEN DO YOU HAVE A DRINK CONTAINING ALCOHOL: TWO TO THREE TIMES A WEEK
HOW OFTEN DO YOU HAVE SIX OR MORE DRINKS ON ONE OCCASION: NEVER
AUDIT-C TOTAL SCORE: 0
HOW OFTEN DURING THE LAST YEAR HAVE YOU BEEN UNABLE TO REMEMBER WHAT HAPPENED THE NIGHT BEFORE BECAUSE YOU HAD BEEN DRINKING: 0
HOW OFTEN DO YOU HAVE A DRINK CONTAINING ALCOHOL: 3
HOW OFTEN DO YOU HAVE A DRINK CONTAINING ALCOHOL: TWO TO THREE TIMES A WEEK
AUDIT TOTAL SCORE: 3
HAVE YOU OR SOMEONE ELSE BEEN INJURED AS A RESULT OF YOUR DRINKING: NO
AUDIT TOTAL SCORE: 0
HOW MANY STANDARD DRINKS CONTAINING ALCOHOL DO YOU HAVE ON A TYPICAL DAY: 0
HAVE YOU OR SOMEONE ELSE BEEN INJURED AS A RESULT OF YOUR DRINKING: 0
HOW OFTEN DO YOU HAVE SIX OR MORE DRINKS ON ONE OCCASION: NEVER
HAS A RELATIVE, FRIEND, DOCTOR, OR ANOTHER HEALTH PROFESSIONAL EXPRESSED CONCERN ABOUT YOUR DRINKING OR SUGGESTED YOU CUT DOWN: NO
HOW MANY STANDARD DRINKS CONTAINING ALCOHOL DO YOU HAVE ON A TYPICAL DAY: 0
HOW OFTEN DURING THE LAST YEAR HAVE YOU BEEN UNABLE TO REMEMBER WHAT HAPPENED THE NIGHT BEFORE BECAUSE YOU HAD BEEN DRINKING: 0
HOW OFTEN DURING THE LAST YEAR HAVE YOU FAILED TO DO WHAT WAS NORMALLY EXPECTED FROM YOU BECAUSE OF DRINKING: NEVER
HAS A RELATIVE, FRIEND, DOCTOR, OR ANOTHER HEALTH PROFESSIONAL EXPRESSED CONCERN ABOUT YOUR DRINKING OR SUGGESTED YOU CUT DOWN: NO
HOW OFTEN DURING THE LAST YEAR HAVE YOU FOUND THAT YOU WERE NOT ABLE TO STOP DRINKING ONCE YOU HAD STARTED: 0
HAS A RELATIVE, FRIEND, DOCTOR, OR ANOTHER HEALTH PROFESSIONAL EXPRESSED CONCERN ABOUT YOUR DRINKING OR SUGGESTED YOU CUT DOWN: 0
AUDIT-C TOTAL SCORE: 3
HOW OFTEN DURING THE LAST YEAR HAVE YOU NEEDED AN ALCOHOLIC DRINK FIRST THING IN THE MORNING TO GET YOURSELF GOING AFTER A NIGHT OF HEAVY DRINKING: 0
HAVE YOU OR SOMEONE ELSE BEEN INJURED AS A RESULT OF YOUR DRINKING: NO
HOW OFTEN DURING THE LAST YEAR HAVE YOU FOUND THAT YOU WERE NOT ABLE TO STOP DRINKING ONCE YOU HAD STARTED: 0
HOW OFTEN DURING THE LAST YEAR HAVE YOU BEEN UNABLE TO REMEMBER WHAT HAPPENED THE NIGHT BEFORE BECAUSE YOU HAD BEEN DRINKING: NEVER
AUDIT TOTAL SCORE: 3
HOW MANY STANDARD DRINKS CONTAINING ALCOHOL DO YOU HAVE ON A TYPICAL DAY: ONE OR TWO
HOW OFTEN DURING THE LAST YEAR HAVE YOU FAILED TO DO WHAT WAS NORMALLY EXPECTED FROM YOU BECAUSE OF DRINKING: 0
HAS A RELATIVE, FRIEND, DOCTOR, OR ANOTHER HEALTH PROFESSIONAL EXPRESSED CONCERN ABOUT YOUR DRINKING OR SUGGESTED YOU CUT DOWN: 0
HOW OFTEN DURING THE LAST YEAR HAVE YOU BEEN UNABLE TO REMEMBER WHAT HAPPENED THE NIGHT BEFORE BECAUSE YOU HAD BEEN DRINKING: NEVER
HOW OFTEN DURING THE LAST YEAR HAVE YOU HAD A FEELING OF GUILT OR REMORSE AFTER DRINKING: 0
AUDIT TOTAL SCORE: 0
HOW OFTEN DURING THE LAST YEAR HAVE YOU NEEDED AN ALCOHOLIC DRINK FIRST THING IN THE MORNING TO GET YOURSELF GOING AFTER A NIGHT OF HEAVY DRINKING: 0
HAVE YOU OR SOMEONE ELSE BEEN INJURED AS A RESULT OF YOUR DRINKING: 0
HOW OFTEN DO YOU HAVE SIX OR MORE DRINKS ON ONE OCCASION: 0
HOW OFTEN DO YOU HAVE SIX OR MORE DRINKS ON ONE OCCASION: 0
HOW OFTEN DURING THE LAST YEAR HAVE YOU NEEDED AN ALCOHOLIC DRINK FIRST THING IN THE MORNING TO GET YOURSELF GOING AFTER A NIGHT OF HEAVY DRINKING: NEVER
AUDIT-C TOTAL SCORE: 3
HOW OFTEN DURING THE LAST YEAR HAVE YOU HAD A FEELING OF GUILT OR REMORSE AFTER DRINKING: 0
HOW OFTEN DURING THE LAST YEAR HAVE YOU HAD A FEELING OF GUILT OR REMORSE AFTER DRINKING: NEVER
HOW OFTEN DURING THE LAST YEAR HAVE YOU FAILED TO DO WHAT WAS NORMALLY EXPECTED FROM YOU BECAUSE OF DRINKING: 0
AUDIT-C TOTAL SCORE: 0
HOW OFTEN DURING THE LAST YEAR HAVE YOU FAILED TO DO WHAT WAS NORMALLY EXPECTED FROM YOU BECAUSE OF DRINKING: NEVER
HOW OFTEN DO YOU HAVE A DRINK CONTAINING ALCOHOL: 3
HOW OFTEN DURING THE LAST YEAR HAVE YOU NEEDED AN ALCOHOLIC DRINK FIRST THING IN THE MORNING TO GET YOURSELF GOING AFTER A NIGHT OF HEAVY DRINKING: NEVER
HOW OFTEN DURING THE LAST YEAR HAVE YOU FOUND THAT YOU WERE NOT ABLE TO STOP DRINKING ONCE YOU HAD STARTED: NEVER
HOW OFTEN DURING THE LAST YEAR HAVE YOU FOUND THAT YOU WERE NOT ABLE TO STOP DRINKING ONCE YOU HAD STARTED: NEVER

## 2022-02-04 ASSESSMENT — PATIENT HEALTH QUESTIONNAIRE - PHQ9
SUM OF ALL RESPONSES TO PHQ QUESTIONS 1-9: 0
SUM OF ALL RESPONSES TO PHQ9 QUESTIONS 1 & 2: 0
SUM OF ALL RESPONSES TO PHQ QUESTIONS 1-9: 0
2. FEELING DOWN, DEPRESSED OR HOPELESS: 0
SUM OF ALL RESPONSES TO PHQ QUESTIONS 1-9: 0
SUM OF ALL RESPONSES TO PHQ QUESTIONS 1-9: 0
1. LITTLE INTEREST OR PLEASURE IN DOING THINGS: 0
1. LITTLE INTEREST OR PLEASURE IN DOING THINGS: 0
SUM OF ALL RESPONSES TO PHQ QUESTIONS 1-9: 0
2. FEELING DOWN, DEPRESSED OR HOPELESS: 0
SUM OF ALL RESPONSES TO PHQ QUESTIONS 1-9: 0
SUM OF ALL RESPONSES TO PHQ QUESTIONS 1-9: 0
SUM OF ALL RESPONSES TO PHQ9 QUESTIONS 1 & 2: 0
SUM OF ALL RESPONSES TO PHQ QUESTIONS 1-9: 0

## 2022-02-24 DIAGNOSIS — E11.9 TYPE 2 DIABETES MELLITUS WITHOUT COMPLICATION, WITHOUT LONG-TERM CURRENT USE OF INSULIN (HCC): ICD-10-CM

## 2022-02-24 DIAGNOSIS — I10 ESSENTIAL HYPERTENSION: ICD-10-CM

## 2022-02-24 RX ORDER — LISINOPRIL 10 MG/1
TABLET ORAL
Qty: 90 TABLET | Refills: 3 | Status: SHIPPED | OUTPATIENT
Start: 2022-02-24

## 2022-02-24 RX ORDER — METFORMIN HYDROCHLORIDE 500 MG/1
TABLET, EXTENDED RELEASE ORAL
Qty: 360 TABLET | Refills: 3 | Status: SHIPPED | OUTPATIENT
Start: 2022-02-24

## 2022-02-25 ENCOUNTER — OFFICE VISIT (OUTPATIENT)
Dept: INTERNAL MEDICINE | Age: 72
End: 2022-02-25
Payer: MEDICARE

## 2022-02-25 VITALS
SYSTOLIC BLOOD PRESSURE: 136 MMHG | WEIGHT: 211 LBS | HEIGHT: 72 IN | HEART RATE: 71 BPM | BODY MASS INDEX: 28.58 KG/M2 | DIASTOLIC BLOOD PRESSURE: 82 MMHG | RESPIRATION RATE: 16 BRPM

## 2022-02-25 DIAGNOSIS — Z12.5 SPECIAL SCREENING FOR MALIGNANT NEOPLASM OF PROSTATE: ICD-10-CM

## 2022-02-25 DIAGNOSIS — E11.9 TYPE 2 DIABETES MELLITUS WITHOUT COMPLICATION, WITHOUT LONG-TERM CURRENT USE OF INSULIN (HCC): ICD-10-CM

## 2022-02-25 DIAGNOSIS — D50.9 IRON DEFICIENCY ANEMIA, UNSPECIFIED IRON DEFICIENCY ANEMIA TYPE: ICD-10-CM

## 2022-02-25 DIAGNOSIS — E78.49 OTHER HYPERLIPIDEMIA: ICD-10-CM

## 2022-02-25 DIAGNOSIS — Z00.00 MEDICARE ANNUAL WELLNESS VISIT, SUBSEQUENT: Primary | ICD-10-CM

## 2022-02-25 DIAGNOSIS — M79.642 PAIN IN BOTH HANDS: ICD-10-CM

## 2022-02-25 DIAGNOSIS — I10 PRIMARY HYPERTENSION: ICD-10-CM

## 2022-02-25 DIAGNOSIS — M79.641 PAIN IN BOTH HANDS: ICD-10-CM

## 2022-02-25 DIAGNOSIS — Z00.00 GENERAL MEDICAL EXAM: ICD-10-CM

## 2022-02-25 PROCEDURE — 99397 PER PM REEVAL EST PAT 65+ YR: CPT | Performed by: INTERNAL MEDICINE

## 2022-02-25 PROCEDURE — 4040F PNEUMOC VAC/ADMIN/RCVD: CPT | Performed by: INTERNAL MEDICINE

## 2022-02-25 PROCEDURE — 1036F TOBACCO NON-USER: CPT | Performed by: INTERNAL MEDICINE

## 2022-02-25 PROCEDURE — 2022F DILAT RTA XM EVC RTNOPTHY: CPT | Performed by: INTERNAL MEDICINE

## 2022-02-25 PROCEDURE — 3017F COLORECTAL CA SCREEN DOC REV: CPT | Performed by: INTERNAL MEDICINE

## 2022-02-25 PROCEDURE — 1123F ACP DISCUSS/DSCN MKR DOCD: CPT | Performed by: INTERNAL MEDICINE

## 2022-02-25 PROCEDURE — 3051F HG A1C>EQUAL 7.0%<8.0%: CPT | Performed by: INTERNAL MEDICINE

## 2022-02-25 PROCEDURE — G8484 FLU IMMUNIZE NO ADMIN: HCPCS | Performed by: INTERNAL MEDICINE

## 2022-02-25 PROCEDURE — G8417 CALC BMI ABV UP PARAM F/U: HCPCS | Performed by: INTERNAL MEDICINE

## 2022-02-25 PROCEDURE — G0463 HOSPITAL OUTPT CLINIC VISIT: HCPCS | Performed by: INTERNAL MEDICINE

## 2022-02-25 PROCEDURE — 99214 OFFICE O/P EST MOD 30 MIN: CPT | Performed by: INTERNAL MEDICINE

## 2022-02-25 PROCEDURE — G8427 DOCREV CUR MEDS BY ELIG CLIN: HCPCS | Performed by: INTERNAL MEDICINE

## 2022-02-25 PROCEDURE — G0439 PPPS, SUBSEQ VISIT: HCPCS | Performed by: INTERNAL MEDICINE

## 2022-02-25 ASSESSMENT — PATIENT HEALTH QUESTIONNAIRE - PHQ9
SUM OF ALL RESPONSES TO PHQ QUESTIONS 1-9: 0
SUM OF ALL RESPONSES TO PHQ QUESTIONS 1-9: 0
1. LITTLE INTEREST OR PLEASURE IN DOING THINGS: 0
SUM OF ALL RESPONSES TO PHQ9 QUESTIONS 1 & 2: 0
2. FEELING DOWN, DEPRESSED OR HOPELESS: 0
SUM OF ALL RESPONSES TO PHQ QUESTIONS 1-9: 0
SUM OF ALL RESPONSES TO PHQ QUESTIONS 1-9: 0

## 2022-02-25 ASSESSMENT — ENCOUNTER SYMPTOMS
EYE PAIN: 0
DIARRHEA: 0
BLOOD IN STOOL: 0
VOMITING: 0
ABDOMINAL PAIN: 0
BACK PAIN: 0
COUGH: 0
NAUSEA: 0
CONSTIPATION: 0
SHORTNESS OF BREATH: 0

## 2022-02-25 ASSESSMENT — LIFESTYLE VARIABLES
HAS A RELATIVE, FRIEND, DOCTOR, OR ANOTHER HEALTH PROFESSIONAL EXPRESSED CONCERN ABOUT YOUR DRINKING OR SUGGESTED YOU CUT DOWN: 0
HOW MANY STANDARD DRINKS CONTAINING ALCOHOL DO YOU HAVE ON A TYPICAL DAY: 1 OR 2
HOW OFTEN DURING THE LAST YEAR HAVE YOU BEEN UNABLE TO REMEMBER WHAT HAPPENED THE NIGHT BEFORE BECAUSE YOU HAD BEEN DRINKING: 0
HOW OFTEN DURING THE LAST YEAR HAVE YOU NEEDED AN ALCOHOLIC DRINK FIRST THING IN THE MORNING TO GET YOURSELF GOING AFTER A NIGHT OF HEAVY DRINKING: 0
HAVE YOU OR SOMEONE ELSE BEEN INJURED AS A RESULT OF YOUR DRINKING: 0
HOW OFTEN DURING THE LAST YEAR HAVE YOU FOUND THAT YOU WERE NOT ABLE TO STOP DRINKING ONCE YOU HAD STARTED: 0
HOW OFTEN DO YOU HAVE A DRINK CONTAINING ALCOHOL: 2-3 TIMES A WEEK
HOW OFTEN DURING THE LAST YEAR HAVE YOU HAD A FEELING OF GUILT OR REMORSE AFTER DRINKING: 0
HOW OFTEN DURING THE LAST YEAR HAVE YOU FAILED TO DO WHAT WAS NORMALLY EXPECTED FROM YOU BECAUSE OF DRINKING: 0

## 2022-02-25 NOTE — PROGRESS NOTES
Medicare Annual Wellness Visit    Howard Xiao is here for Medicare AWV (6 month), Hypertension, Hyperlipidemia, and Diabetes    Assessment & Plan   Jc Mcnally was seen today for medicare awv, hypertension, hyperlipidemia and diabetes. Diagnoses and all orders for this visit:    Medicare annual wellness visit, subsequent    Other hyperlipidemia  -     Lipid Panel; Future    Type 2 diabetes mellitus without complication, without long-term current use of insulin (HCC)  -     Hemoglobin A1C; Future  -     Microalbumin, Ur; Future    Primary hypertension  -     Comprehensive Metabolic Panel; Future    General medical exam  -     Comprehensive Metabolic Panel; Future    Iron deficiency anemia, unspecified iron deficiency anemia type  -     Hemoglobin; Future  -     Iron and TIBC; Future    Special screening for malignant neoplasm of prostate  -     PSA Screening; Future         Recommendations for Preventive Services Due: see orders and patient instructions/AVS.  Recommended screening schedule for the next 5-10 years is provided to the patient in written form: see Patient Instructions/AVS.     Return in about 26 weeks (around 8/26/2022) for Hypertension, Hyperlipidemia, Diabetes. Subjective       Patient's complete Health Risk Assessment and screening values have been reviewed and are found in Flowsheets. The following problems were reviewed today and where indicated follow up appointments were made and/or referrals ordered. Positive Risk Factor Screenings with Interventions:        Alcohol Screening:  AUDIT Total Score: 3    A score of 8 or more is associated with harmful or hazardous drinking. A score of 13 or more in women, and 15 or more in men, is likely to indicate alcohol dependence.     Substance Abuse - Alcohol Interventions:  drinks socially          General Health and ACP:  General  In general, how would you say your health is?: Very Good  In the past 7 days, have you experienced any of the following: New or Increased Pain, New or Increased Fatigue, Loneliness, Social Isolation, Stress or Anger?: No  Select all that apply: None of These  Do you get the social and emotional support that you need?: Yes  Do you have a Living Will?: Yes    Advance Directives     Power of  Living Will ACP-Advance Directive ACP-Power of     Not on File Not on File Not on File Not on File      General Health Risk Interventions:  · .              Objective   Vitals:    02/25/22 0838   BP: 136/82   Site: Left Upper Arm   Position: Sitting   Cuff Size: Large Adult   Pulse: 71   Resp: 16   Weight: 211 lb (95.7 kg)   Height: 6' (1.829 m)      Body mass index is 28.62 kg/m². No Known Allergies  Prior to Visit Medications    Medication Sig Taking? Authorizing Provider   metFORMIN (GLUCOPHAGE-XR) 500 MG extended release tablet TAKE 2 TABLETS BY MOUTH TWO TIMES A DAY  Veronique Carrera MD   lisinopril (PRINIVIL;ZESTRIL) 10 MG tablet TAKE 1 TABLET BY MOUTH EVERY DAY  Veronique Carrera MD   Blood Pressure KIT Standard size adult arm cuff  AXEL Simpson CNP   rosuvastatin (CRESTOR) 10 MG tablet Take 1 tablet by mouth daily  Veronique Carrera MD   aspirin 81 MG EC tablet Take 81 mg by mouth daily  Historical Provider, MD   blood glucose test strips (ACCU-CHEK BYRON PLUS) strip TEST TWO TIMES DAILY FOR IRREGULAR BLOOD GLUCOSE  AXEL Simpson CNP   Blood Glucose Monitoring Suppl (ONE TOUCH ULTRA 2) by Does not apply route.  Tests blood sugar once a day  Historical Provider, MD [place] (Including outside providers/suppliers regularly involved in providing care):   Patient Care Team:  Veronique Carrera MD as PCP - General (Internal Medicine)  Veronique Crarera MD as PCP - [facility] Empaneled Provider  AXEL De La Paz CNP as Nurse Practitioner (Nurse Practitioner)    Reviewed and updated this visit:  Tobacco  Allergies  Meds  Problems  Med Hx  Surg Hx  Soc Hx  Fam Hx I ,  Dr. Corazon Trejo, directly supervised the performance of this Medicare annual wellness visit

## 2022-02-25 NOTE — PROGRESS NOTES
[place] [address]  Dept: [phone]  Dept Fax: [phone]  Loc: [phone]     Gifty Liriano is a 80 y.o. male who presents today for his medical conditions/complaintsas noted below. Gifty Liriano is c/o of   Chief Complaint   Patient presents with    Medicare AWV     6 month    Hypertension    Hyperlipidemia    Diabetes         HPI:     Hypertension  This is a chronic problem. The current episode started more than 1 year ago. The problem has been waxing and waning since onset. The problem is controlled. Pertinent negatives include no chest pain, headaches, neck pain, palpitations or shortness of breath. Hyperlipidemia  This is a chronic problem. The current episode started more than 1 year ago. The problem is controlled. Recent lipid tests were reviewed and are variable. Pertinent negatives include no chest pain or shortness of breath. Diabetes  He presents for his follow-up diabetic visit. He has type 2 diabetes mellitus. His disease course has been fluctuating. Pertinent negatives for hypoglycemia include no confusion, dizziness, headaches, nervousness/anxiousness or pallor. Pertinent negatives for diabetes include no chest pain, no polydipsia, no polyuria and no weakness. Other  This is a recurrent (4- general medical exam) problem. The current episode started today. The problem occurs intermittently. The problem has been waxing and waning. Pertinent negatives include no abdominal pain, arthralgias, chest pain, chills, coughing, fever, headaches, nausea, neck pain, numbness, rash, vomiting or weakness. Hemoglobin A1C (%)   Date Value   01/28/2022 7.0 (H)   10/29/2021 6.4 (H)   08/11/2021 6.5 (H)            Microalb/Crt.  Ratio (mcg/mg creat)   Date Value   07/26/2021 CANNOT BE CALCULATED     LDL Cholesterol (mg/dL)   Date Value   07/26/2021 36   07/23/2020 52 2019 51         AST (U/L)   Date Value   2021 17     ALT (U/L)   Date Value   2021 17     BUN (mg/dL)   Date Value   2021 13     BP Readings from Last 3 Encounters:   22 136/82   21 (!) 142/70   21 132/70              Past Medical History:   Diagnosis Date    Anemia     minimal anemia, hemoglobin 13.6    Benign prostatic hypertrophy     Cyst in hand     distal phalanx, mid finger left hand    Hyperlipidemia     Hypertension     Keratosis     right forehead    Overweight(278.02)     Seborrhea     Type II or unspecified type diabetes mellitus without mention of complication, not stated as uncontrolled       Past Surgical History:   Procedure Laterality Date    COLONOSCOPY N/A 2020    diffuse severe diverticulosis, TA, hyperplastic polyp.   at [facility]       Family History   Problem Relation Age of Onset    Diabetes Mother     Cancer Father         throat cancer    Stroke Father     Cancer Brother 64        pancreatic    Cancer Paternal Uncle         lung          Social History     Tobacco Use    Smoking status: Former Smoker     Packs/day: 3.00     Years: 20.00     Pack years: 60.00     Types: Cigarettes     Quit date: 2000     Years since quittin.2    Smokeless tobacco: Never Used   Substance Use Topics    Alcohol use: Yes     Comment: no alcohol ingestion after a heavier intake eearlier in his life         Current Outpatient Medications   Medication Sig Dispense Refill    diclofenac sodium (VOLTAREN) 1 % GEL Apply 2 g topically 4 times daily 2 g 3    metFORMIN (GLUCOPHAGE-XR) 500 MG extended release tablet TAKE 2 TABLETS BY MOUTH TWO TIMES A  tablet 3    lisinopril (PRINIVIL;ZESTRIL) 10 MG tablet TAKE 1 TABLET BY MOUTH EVERY DAY 90 tablet 3    Blood Pressure KIT Standard size adult arm cuff 1 kit 0    rosuvastatin (CRESTOR) 10 MG tablet Take 1 tablet by mouth daily 90 tablet 3    aspirin 81 MG EC tablet Take 81 mg by mouth daily      blood glucose test strips (ACCU-CHEK BYRON PLUS) strip TEST TWO TIMES DAILY FOR IRREGULAR BLOOD GLUCOSE 200 strip 3    Blood Glucose Monitoring Suppl (ONE TOUCH ULTRA 2) by Does not apply route. Tests blood sugar once a day       No current facility-administered medications for this visit. No Known Allergies    Health Maintenance   Topic Date Due    Hepatitis C screen  Never done    Shingles Vaccine (1 of 2) Never done    Pneumococcal 65+ years Vaccine (1 of 1 - PPSV23) Never done    Diabetic foot exam  05/06/2022    Diabetic microalbuminuria test  07/26/2022    Lipid screen  07/26/2022    Potassium monitoring  07/26/2022    Creatinine monitoring  07/26/2022    A1C test (Diabetic or Prediabetic)  01/28/2023    Depression Screen  02/04/2023    Diabetic retinal exam  03/25/2023    Colorectal Cancer Screen  09/14/2025    DTaP/Tdap/Td vaccine (2 - Td or Tdap) 02/10/2031    Flu vaccine  Completed    COVID-19 Vaccine  Completed    AAA screen  Completed    Hepatitis A vaccine  Aged Out    Hib vaccine  Aged Out    Meningococcal (ACWY) vaccine  Aged Out       Subjective:      Review of Systems   Constitutional: Negative for chills and fever. HENT: Negative for hearing loss. Eyes: Negative for pain and visual disturbance. Respiratory: Negative for cough and shortness of breath. Cardiovascular: Negative for chest pain, palpitations and leg swelling. Gastrointestinal: Negative for abdominal pain, blood in stool, constipation, diarrhea, nausea and vomiting. Endocrine: Negative for cold intolerance, polydipsia and polyuria. Genitourinary: Negative for difficulty urinating, dysuria and hematuria. Musculoskeletal: Negative for arthralgias, back pain, gait problem and neck pain. Skin: Negative for pallor and rash. Neurological: Negative for dizziness, weakness, numbness and headaches. Hematological: Negative for adenopathy. Does not bruise/bleed easily. Psychiatric/Behavioral: Negative for confusion. The patient is not nervous/anxious. Objective:     Physical Exam  Vitals reviewed. Constitutional:       Appearance: He is well-developed. HENT:      Head: Normocephalic and atraumatic. Eyes:      Pupils: Pupils are equal, round, and reactive to light. Cardiovascular:      Rate and Rhythm: Normal rate and regular rhythm. Heart sounds: No murmur heard. No friction rub. No gallop. Pulmonary:      Effort: Pulmonary effort is normal.      Breath sounds: Normal breath sounds. No wheezing or rales. Abdominal:      General: There is no distension. Palpations: Abdomen is soft. There is no mass. Tenderness: There is no abdominal tenderness. There is no rebound. Musculoskeletal:         General: Normal range of motion. Cervical back: Neck supple. Lymphadenopathy:      Cervical: No cervical adenopathy. Skin:     General: Skin is warm and dry. Findings: No rash. Neurological:      Mental Status: He is alert and oriented to person, place, and time. Cranial Nerves: No cranial nerve deficit (grossly). Psychiatric:         Thought Content: Thought content normal.        /82 (Site: Left Upper Arm, Position: Sitting, Cuff Size: Large Adult)   Pulse 71   Resp 16   Ht 6' (1.829 m)   Wt 211 lb (95.7 kg)   BMI 28.62 kg/m²     Assessment:       Diagnosis Orders   1. Medicare annual wellness visit, subsequent     2. Other hyperlipidemia  Lipid Panel   3. Type 2 diabetes mellitus without complication, without long-term current use of insulin (HCC)  Hemoglobin A1C    Microalbumin, Ur   4. Primary hypertension  Comprehensive Metabolic Panel   5. General medical exam  Comprehensive Metabolic Panel   6. Iron deficiency anemia, unspecified iron deficiency anemia type  Hemoglobin    Iron and TIBC   7. Special screening for malignant neoplasm of prostate  PSA Screening   8.  Pain in both hands  diclofenac sodium (VOLTAREN) 1 % GEL      Reviewed multiple test results for this appointment. 1/28/2022 normal hemoglobin 13.0.    1/20/2022 normal iron equals 77.    1/20/2022 okay hemoglobin A1c at 7.0. Patient told to continue Metformin. Plan:       Return in about 26 weeks (around 8/26/2022) for Hypertension, Hyperlipidemia, Diabetes. Orders Placed This Encounter   Procedures    Comprehensive Metabolic Panel     Standing Status:   Future     Standing Expiration Date:   2/25/2023    PSA Screening     Standing Status:   Future     Standing Expiration Date:   2/25/2023    Hemoglobin A1C     Standing Status:   Future     Standing Expiration Date:   2/25/2023   Denver Beard     Standing Status:   Future     Standing Expiration Date:   2/25/2023    Hemoglobin     Standing Status:   Future     Standing Expiration Date:   2/25/2023    Iron and TIBC     Standing Status:   Future     Standing Expiration Date:   2/25/2023     Order Specific Question:   Is Patient Fasting? Answer:   na     Order Specific Question:   No of Hours? Answer:   na    Lipid Panel     Standing Status:   Future     Standing Expiration Date:   2/25/2023     Order Specific Question:   Is Patient Fasting?/# of Hours     Answer:   yes     Orders Placed This Encounter   Medications    diclofenac sodium (VOLTAREN) 1 % GEL     Sig: Apply 2 g topically 4 times daily     Dispense:  2 g     Refill:  3       Patientgiven educational materials - see patient instructions. Discussed use, benefit,and side effects of prescribed medications. All patient questions answered. Ptvoiced understanding. Reviewed health maintenance. Instructed to continue currentmedications, diet and exercise. Patient agreed with treatment plan. Follow up asdirected.      Electronically signed by Kristopher Thomas MD on 2/25/2022 at 9:06 AM

## 2022-02-25 NOTE — PATIENT INSTRUCTIONS
Personalized Preventive Plan for Hair Villatoro - 2/25/2022  Medicare offers a range of preventive health benefits. Some of the tests and screenings are paid in full while other may be subject to a deductible, co-insurance, and/or copay. Some of these benefits include a comprehensive review of your medical history including lifestyle, illnesses that may run in your family, and various assessments and screenings as appropriate. After reviewing your medical record and screening and assessments performed today your provider may have ordered immunizations, labs, imaging, and/or referrals for you. A list of these orders (if applicable) as well as your Preventive Care list are included within your After Visit Summary for your review. Other Preventive Recommendations:    · A preventive eye exam performed by an eye specialist is recommended every 1-2 years to screen for glaucoma; cataracts, macular degeneration, and other eye disorders. · A preventive dental visit is recommended every 6 months. · Try to get at least 150 minutes of exercise per week or 10,000 steps per day on a pedometer . · Order or download the FREE \"Exercise & Physical Activity: Your Everyday Guide\" from [employer]. Call 9-505.761.9645 or search [employer] online. · You need 7261-1847 mg of calcium and 8212-8081 IU of vitamin D per day. It is possible to meet your calcium requirement with diet alone, but a vitamin D supplement is usually necessary to meet this goal.  · When exposed to the sun, use a sunscreen that protects against both UVA and UVB radiation with an SPF of 30 or greater. Reapply every 2 to 3 hours or after sweating, drying off with a towel, or swimming. · Always wear a seat belt when traveling in a car. Always wear a helmet when riding a bicycle or motorcycle.

## 2022-04-01 LAB — DIABETIC RETINOPATHY: NEGATIVE

## 2022-05-05 ENCOUNTER — HOSPITAL ENCOUNTER (OUTPATIENT)
Dept: LAB | Age: 72
Discharge: HOME | End: 2022-05-05
Payer: MEDICARE

## 2022-05-05 ENCOUNTER — OFFICE VISIT (OUTPATIENT)
Dept: DIABETES SERVICES | Age: 72
End: 2022-05-05
Payer: MEDICARE

## 2022-05-05 ENCOUNTER — TELEPHONE (OUTPATIENT)
Dept: INTERNAL MEDICINE | Age: 72
End: 2022-05-05

## 2022-05-05 VITALS
RESPIRATION RATE: 16 BRPM | DIASTOLIC BLOOD PRESSURE: 78 MMHG | BODY MASS INDEX: 29.26 KG/M2 | HEIGHT: 72 IN | HEART RATE: 60 BPM | WEIGHT: 216 LBS | SYSTOLIC BLOOD PRESSURE: 138 MMHG

## 2022-05-05 DIAGNOSIS — E78.49 OTHER HYPERLIPIDEMIA: ICD-10-CM

## 2022-05-05 DIAGNOSIS — I73.9 PAD (PERIPHERAL ARTERY DISEASE) (HCC): ICD-10-CM

## 2022-05-05 DIAGNOSIS — E11.9 TYPE 2 DIABETES MELLITUS WITHOUT COMPLICATION, WITHOUT LONG-TERM CURRENT USE OF INSULIN (HCC): Primary | ICD-10-CM

## 2022-05-05 DIAGNOSIS — I10 ESSENTIAL HYPERTENSION: ICD-10-CM

## 2022-05-05 DIAGNOSIS — E11.9 TYPE 2 DIABETES MELLITUS WITHOUT COMPLICATION, WITHOUT LONG-TERM CURRENT USE OF INSULIN (HCC): ICD-10-CM

## 2022-05-05 LAB
ANION GAP SERPL CALCULATED.3IONS-SCNC: 9 MMOL/L (ref 9–17)
BUN BLDV-MCNC: 21 MG/DL (ref 8–23)
BUN/CREAT BLD: 22 (ref 9–20)
CALCIUM SERPL-MCNC: 9.1 MG/DL (ref 8.6–10.4)
CHLORIDE BLD-SCNC: 100 MMOL/L (ref 98–107)
CO2: 28 MMOL/L (ref 20–31)
CREAT SERPL-MCNC: 0.96 MG/DL (ref 0.7–1.2)
CREATININE URINE: 39.6 MG/DL (ref 39–259)
ESTIMATED AVERAGE GLUCOSE: 148 MG/DL
GFR AFRICAN AMERICAN: >60 ML/MIN
GFR NON-AFRICAN AMERICAN: >60 ML/MIN
GFR SERPL CREATININE-BSD FRML MDRD: ABNORMAL ML/MIN/{1.73_M2}
GLUCOSE BLD-MCNC: 141 MG/DL (ref 70–99)
HBA1C MFR BLD: 6.8 % (ref 4–6)
MICROALBUMIN/CREAT 24H UR: <12 MG/L
MICROALBUMIN/CREAT UR-RTO: NORMAL MCG/MG CREAT
POTASSIUM SERPL-SCNC: 4.1 MMOL/L (ref 3.7–5.3)
SODIUM BLD-SCNC: 137 MMOL/L (ref 135–144)

## 2022-05-05 PROCEDURE — G8417 CALC BMI ABV UP PARAM F/U: HCPCS | Performed by: NURSE PRACTITIONER

## 2022-05-05 PROCEDURE — G8427 DOCREV CUR MEDS BY ELIG CLIN: HCPCS | Performed by: NURSE PRACTITIONER

## 2022-05-05 PROCEDURE — 4040F PNEUMOC VAC/ADMIN/RCVD: CPT | Performed by: NURSE PRACTITIONER

## 2022-05-05 PROCEDURE — 99214 OFFICE O/P EST MOD 30 MIN: CPT | Performed by: NURSE PRACTITIONER

## 2022-05-05 PROCEDURE — 82043 UR ALBUMIN QUANTITATIVE: CPT

## 2022-05-05 PROCEDURE — 83036 HEMOGLOBIN GLYCOSYLATED A1C: CPT

## 2022-05-05 PROCEDURE — 1036F TOBACCO NON-USER: CPT | Performed by: NURSE PRACTITIONER

## 2022-05-05 PROCEDURE — 36415 COLL VENOUS BLD VENIPUNCTURE: CPT

## 2022-05-05 PROCEDURE — 3051F HG A1C>EQUAL 7.0%<8.0%: CPT | Performed by: NURSE PRACTITIONER

## 2022-05-05 PROCEDURE — 2022F DILAT RTA XM EVC RTNOPTHY: CPT | Performed by: NURSE PRACTITIONER

## 2022-05-05 PROCEDURE — 80048 BASIC METABOLIC PNL TOTAL CA: CPT

## 2022-05-05 PROCEDURE — 82570 ASSAY OF URINE CREATININE: CPT

## 2022-05-05 PROCEDURE — 1123F ACP DISCUSS/DSCN MKR DOCD: CPT | Performed by: NURSE PRACTITIONER

## 2022-05-05 PROCEDURE — 3017F COLORECTAL CA SCREEN DOC REV: CPT | Performed by: NURSE PRACTITIONER

## 2022-05-05 ASSESSMENT — ENCOUNTER SYMPTOMS
ABDOMINAL PAIN: 0
DIARRHEA: 0
SHORTNESS OF BREATH: 0
RESPIRATORY NEGATIVE: 1

## 2022-05-05 NOTE — PROGRESS NOTES
[address], [address]  [facility] [address] [phone]        HISTORY:    Ladan Campbell presents today for evaluation and management of:  Chief Complaint   Patient presents with    6 Month Follow-Up    Diabetes       HPI    Interval History:    Past DM Medications   Glimepiride-therapy completed  Starlix-therapy completed  Ozempic-cost     Current Diabetic Medications  Metformin 1000 mg bid     DKA episodes: 0    10/15/20   He his doing well with diet and medication compliance. He test bs once daily around 4 am with a higher than expected average of 150. He does not sleep well, snoring seems to be better with weight loss. Not interested in sleep study. He does toss and turn with shoulder pain. He does not notice a correlation with what he at the day before. He is also seen Dr. Divya Paige for diverticulitis and hernia issues.   Diet: low carb  Exercise: physically active during the day   BS testing: once daily average of 150 later day bs average 80  Issues:      05/06/21   At previous visit diabetes counseling was provided. Diet: low carb  Exercise: work and yard work  [employer] testing: once daily average 130  Issues: low blood sugars 1 per month while outside working. 50's     11/04/21   At previous visit dm counseling was provided. Denies any s/s of hyper/hypoglycemia. Blurry vision in the am discussed with eye doctor and no issues identified. Diet: mindful eating   Exercise: none  BS testing: once daily 130-150  Issues: denies   Diabetic foot exam up-to-date: Yes  Diabetic retinal exam up-to-date: Yes  Hypoglycemia as needed treatment: snack     05/05/22   Ladan Campbell is a 80 y.o. male patient who presents to clinic today for his diabetes. he has a history of HTN, hyperlipidemia, PAD and obesity which contributes to his diabetes. At previous visit diabetes counseling was provided.  he denies any current signs or symptoms of hyper/hypoglycemia. he states they are taking their medications as prescribed without any adverse effects. He has a history of PAD. He does complain of leg pain while legs are elevated and very cold feeling. He denies swelling. He does complain of neuropathy. He states he was seen by a general surgeon and was told from a scan he had a clot in the back of his knee. He was referred to vascular and had a carotid and LUCY there was no follow up and pt would like a second opinion. Diet: low carb  Exercise: none  BS testing: once daily average 130  Issues: denies  Diabetic foot exam up-to-date: Yes  Diabetic retinal exam up-to-date: Yes  Hypoglycemia as needed treatment: snack    High cholesterol-  Takes crestor and denies any adverse effects with its use.  Watches diet and exercise.      Hypertension-  Takes lisinopril and denies any adverse effects with their use. Watches diet and exercise.  Denies any chest pain, dizziness or edema.      Past Medical History:   Diagnosis Date    Anemia     minimal anemia, hemoglobin 13.6    Benign prostatic hypertrophy     Cyst in hand     distal phalanx, mid finger left hand    Hyperlipidemia     Hypertension     Keratosis     right forehead    Overweight(278.02)     Seborrhea     Type II or unspecified type diabetes mellitus without mention of complication, not stated as uncontrolled      Family History   Problem Relation Age of Onset    Diabetes Mother     Cancer Father         throat cancer    Stroke Father     Cancer Brother 64        pancreatic    Cancer Paternal Uncle         lung     Social History     Tobacco Use    Smoking status: Former Smoker     Packs/day: 3.00     Years: 20.00     Pack years: 60.00     Types: Cigarettes     Quit date: 2000     Years since quittin.4    Smokeless tobacco: Never Used   Vaping Use    Vaping Use: Never used   Substance Use Topics    Alcohol use: Yes     Comment: no alcohol ingestion after a heavier intake lizzy in his life    Drug use: No     No Known Allergies    MEDICATIONS:  Current Outpatient Medications   Medication Sig Dispense Refill    metFORMIN (GLUCOPHAGE-XR) 500 MG extended release tablet TAKE 2 TABLETS BY MOUTH TWO TIMES A  tablet 3    lisinopril (PRINIVIL;ZESTRIL) 10 MG tablet TAKE 1 TABLET BY MOUTH EVERY DAY 90 tablet 3    rosuvastatin (CRESTOR) 10 MG tablet Take 1 tablet by mouth daily 90 tablet 3    aspirin 81 MG EC tablet Take 81 mg by mouth daily      Blood Pressure KIT Standard size adult arm cuff 1 kit 0    blood glucose test strips (ACCU-CHEK BYRON PLUS) strip TEST TWO TIMES DAILY FOR IRREGULAR BLOOD GLUCOSE 200 strip 3    Blood Glucose Monitoring Suppl (ONE TOUCH ULTRA 2) by Does not apply route. Tests blood sugar once a day       No current facility-administered medications for this visit. Review ofSymptoms:  Review of Systems   Constitutional: Positive for fatigue. Negative for unexpected weight change. Eyes: Negative for visual disturbance. Respiratory: Negative. Negative for shortness of breath. Cardiovascular: Negative for chest pain and leg swelling. Gastrointestinal: Negative for abdominal pain and diarrhea. Endocrine: Negative for polydipsia, polyphagia and polyuria. Genitourinary: Negative. Musculoskeletal: Negative. Skin: Negative for rash and wound. Neurological: Negative for dizziness, tremors, seizures and headaches. Psychiatric/Behavioral: Negative. Negative for confusion and decreased concentration. Theremainder of a complete 14-point review of systems is negative. Vital Signs: /78 (Site: Right Upper Arm, Position: Sitting, Cuff Size: Medium Adult)   Pulse 60   Resp 16   Ht 6' (1.829 m)   Wt 216 lb (98 kg)   BMI 29.29 kg/m²      Wt Readings from Last 3 Encounters:   05/05/22 216 lb (98 kg)   02/25/22 211 lb (95.7 kg)   11/04/21 213 lb (96.6 kg)     Body mass index is 29.29 kg/m².   LABS:  Hemoglobin A1C Date Value Ref Range Status   01/28/2022 7.0 (H) 4.0 - 6.0 % Final   10/29/2021 6.4 (H) 4.0 - 6.0 % Final     Lab Results   Component Value Date    LABMICR CANNOT BE CALCULATED 07/26/2021     Lab Results   Component Value Date     05/05/2022    K 4.1 05/05/2022     05/05/2022    CO2 28 05/05/2022    BUN 21 05/05/2022    CREATININE 0.96 05/05/2022    GLUCOSE 141 (H) 05/05/2022    CALCIUM 9.1 05/05/2022    PROT 6.5 07/26/2021    LABALBU 4.3 07/26/2021    BILITOT 0.81 07/26/2021    ALKPHOS 56 07/26/2021    AST 17 07/26/2021    ALT 17 07/26/2021    LABGLOM >60 05/05/2022    GFRAA >60 05/05/2022     Lab Results   Component Value Date    CHOL 93 07/26/2021    CHOL 99 07/23/2020    CHOL 101 07/16/2019     Lab Results   Component Value Date    TRIG 93 07/26/2021    TRIG 91 07/23/2020    TRIG 98 07/16/2019     Lab Results   Component Value Date    HDL 38 (L) 07/26/2021    HDL 29 (L) 07/23/2020    HDL 30 (L) 07/16/2019     Lab Results   Component Value Date    LDLCHOLESTEROL 36 07/26/2021    LDLCHOLESTEROL 52 07/23/2020    LDLCHOLESTEROL 51 07/16/2019     Lab Results   Component Value Date    VLDL NOT REPORTED 07/26/2021    VLDL NOT REPORTED 07/23/2020    VLDL NOT REPORTED 07/16/2019     Lab Results   Component Value Date    CHOLHDLRATIO 2.4 07/26/2021    CHOLHDLRATIO 3.4 07/23/2020    CHOLHDLRATIO 3.4 07/16/2019           Physical Exam  Constitutional:       Appearance: He is well-developed. Eyes:      Pupils: Pupils are equal, round, and reactive to light. Cardiovascular:      Rate and Rhythm: Normal rate and regular rhythm. Pulmonary:      Effort: Pulmonary effort is normal.      Breath sounds: Normal breath sounds. Skin:     General: Skin is warm and dry. Findings: No lesion (no lipohypertrophy) or rash. Neurological:      Mental Status: He is alert and oriented to person, place, and time. Sensory: No sensory deficit.    Psychiatric:         Speech: Speech normal.         Behavior: Behavior normal.         Thought Content: Thought content normal.         Judgment: Judgment normal.         Diabetic foot exam: normal color, capillary refill wnl, no swelling noted  Left Foot:   Visual Exam: callous- noted to great bilateral toes   Pulse DP: 2+ (normal)   Filament test: normal sensation   Vibratory Sensation: absent  Right Foot:   Visual Exam: callous- bilatater gret toes   Pulse DP: 2+ (normal)   Filament test: normal sensation   Vibratory Sensation: absent     ASSESSMENT/PLAN:     Diagnosis Orders   1. Type 2 diabetes mellitus without complication, without long-term current use of insulin ([facility])   DIABETES FOOT EXAM    Basic Metabolic Panel    Hemoglobin A1C    Microalbumin, Ur   2. Other hyperlipidemia     3. Essential hypertension     4. PAD (peripheral artery disease) [facility])  External Referral To Vascular Surgery     Orders Placed This Encounter   Procedures    Basic Metabolic Panel    Hemoglobin A1C    Microalbumin, Ur    External Referral To Vascular Surgery     DIABETES FOOT EXAM     No orders of the defined types were placed in this encounter. Requested Prescriptions      No prescriptions requested or ordered in this encounter       1. Type 2 diabetes mellitus without complication, without long-term current use of insulin (HCC)  - Stable  HbA1C goal is less than 7%. - Fasting blood glucose goal is 70-120mg/dl and postprandial blood sugar goal is less than 180 mg/dl. - Labs reviewed including most recent A1c, microalbumin and kidney function. Repeat labs due in 3 months.    -We discussed in great detail dietary modifications they can make to better improve their blood sugars.  -doing well no changes.   -labs are pending if not at goal will consider sglt2    Discussed signs and symptoms of hyper/hypoglycemia and how to treat. Encouraged 150 minutes of physical activity per week. Follow a low carbohydrate diet. Encouraged at least 7 hours of sleep. The patient was informed of the goals of diabetes management. This can only be accomplished by watching their diet and exercise levels. We certainly use medicines to help attain these goals. The consequences of not controlling blood sugars were discussed. These include blindness, heart disease, stroke, kidney disease, and possibly need for dialysis. They were told to be careful with their foot care as diabetics often have nerve damage, infections and risk for limb amutations . They also need a dilated eye exam yearly. We discussed the issues of diet, exercise, medication, complication avoidance, reviewed the signs and symptoms of diabetes, hypoglycemic episodes, significance of HbA1C.       -  DIABETES FOOT EXAM  - Basic Metabolic Panel; Future  - Hemoglobin A1C; Future  - Microalbumin, Ur; Future    2. Other hyperlipidemia  stable, lipid panel reviewed, continue current medications. Diet and exercise      3. Essential hypertension   stable, continue current medications. Diet and exercise Seek emergent care if chest pain develops. 4. PAD (peripheral artery disease) (HCC)  LUCY  Normal bilateral lower extremity LUCY indices and pressures.  However,   waveforms in the left lower extremity are biphasic.  Normal triphasic   waveforms on the right. Carotid  Impression   The right internal carotid artery demonstrates 0-50% stenosis .       The left internal carotid artery demonstrates 0-50% stenosis .       Bilateral vertebral arteries are patent with flow in the normal direction. -referral for second option.   - External Referral To Vascular Surgery        Answered all patient questions. Agrees to follow plan of care and to follow up in 3 months, sooner if needed. Call office if unexplained blood sugars less than 70 occur or above 400. Call office or access MyChart with any further questions or concerns. Be sure to bring glucometer/food log at next appointment.        Total time spent reviewing chart, labs, counseling patient and documenting on the date of the encounter: 30 min.  .     Electronically signed by AXEL Mack CNP on 5/5/2022 at 9:35 AM      (Please note that portions of this note were completed with a voice-recognition program. Efforts were made to edit the dictation but occasionally words are mis-transcribed.)

## 2022-05-05 NOTE — TELEPHONE ENCOUNTER
Patient requesting second opinion to vascular. Faxed the following to [address] [facility] at fax number [phone]:    Referral, demographics,  updated med list/allergies, results of imaging completed on 6/4/2020, Dr. Kenna Fu ov note from 3/12/2020, and crystals recent ov note completed on 5/5/2022. Notified patient. Also advised that if he didn't hear from them within a week or two to give them a call to check status. Understands.

## 2022-05-09 DIAGNOSIS — E11.9 TYPE 2 DIABETES MELLITUS WITHOUT COMPLICATION, WITHOUT LONG-TERM CURRENT USE OF INSULIN (HCC): Primary | ICD-10-CM

## 2022-05-12 NOTE — PATIENT INSTRUCTIONS
Patient Education        Learning About Diabetes Food Guidelines  Your Care Instructions    Meal planning is important to manage diabetes. It helps keep your blood sugar at a target level (which you set with your doctor). You don't have to eat special foods. You can eat what your family eats, including sweets once in a while. But you do have to pay attention to how often you eat and how much you eat of certain foods. You may want to work with a dietitian or a certified diabetes educator (CDE) to help you plan meals and snacks. A dietitian or CDE can also help you lose weight if that is one of your goals. What should you know about eating carbs? Managing the amount of carbohydrate (carbs) you eat is an important part of healthy meals when you have diabetes. Carbohydrate is found in many foods. · Learn which foods have carbs. And learn the amounts of carbs in different foods. ? Bread, cereal, pasta, and rice have about 15 grams of carbs in a serving. A serving is 1 slice of bread (1 ounce), ½ cup of cooked cereal, or 1/3 cup of cooked pasta or rice. ? Fruits have 15 grams of carbs in a serving. A serving is 1 small fresh fruit, such as an apple or orange; ½ of a banana; ½ cup of cooked or canned fruit; ½ cup of fruit juice; 1 cup of melon or raspberries; or 2 tablespoons of dried fruit. ? Milk and no-sugar-added yogurt have 15 grams of carbs in a serving. A serving is 1 cup of milk or 2/3 cup of no-sugar-added yogurt. ? Starchy vegetables have 15 grams of carbs in a serving. A serving is ½ cup of mashed potatoes or sweet potato; 1 cup winter squash; ½ of a small baked potato; ½ cup of cooked beans; or ½ cup cooked corn or green peas. · Learn how much carbs to eat each day and at each meal. A dietitian or CDE can teach you how to keep track of the amount of carbs you eat. This is called carbohydrate counting. · If you are not sure how to count carbohydrate grams, use the Plate Method to plan meals.  It is a when cooking. · Don't skip meals. Your blood sugar may drop too low if you skip meals and take insulin or certain medicines for diabetes. · Check with your doctor before you drink alcohol. Alcohol can cause your blood sugar to drop too low. Alcohol can also cause a bad reaction if you take certain diabetes medicines. Follow-up care is a key part of your treatment and safety. Be sure to make and go to all appointments, and call your doctor if you are having problems. It's also a good idea to know your test results and keep a list of the medicines you take. Where can you learn more? Go to https://"[employer]pepiceweb.[employer]. org and sign in to your [employer] account. Enter U517 in the 12Return box to learn more about \"Learning About Diabetes Food Guidelines. \"     If you do not have an account, please click on the \"Sign Up Now\" link. Current as of: July 25, 2018  Content Version: 12.0  © 8784-4836 [employer]wise, Inc[employer]. Care instructions adapted under license by [facility] [place] [facility] If you have questions about a medical condition or this instruction, always ask your healthcare professional. [place] [address] any warranty or liability for your use of this information. No

## 2022-08-02 ENCOUNTER — OFFICE VISIT (OUTPATIENT)
Dept: PRIMARY CARE CLINIC | Age: 72
End: 2022-08-02
Payer: MEDICARE

## 2022-08-02 VITALS
OXYGEN SATURATION: 95 % | BODY MASS INDEX: 28.99 KG/M2 | TEMPERATURE: 97.2 F | WEIGHT: 214 LBS | DIASTOLIC BLOOD PRESSURE: 78 MMHG | HEIGHT: 72 IN | HEART RATE: 73 BPM | SYSTOLIC BLOOD PRESSURE: 150 MMHG

## 2022-08-02 DIAGNOSIS — K06.8: ICD-10-CM

## 2022-08-02 DIAGNOSIS — R22.0 LEFT FACIAL SWELLING: Primary | ICD-10-CM

## 2022-08-02 PROCEDURE — G8417 CALC BMI ABV UP PARAM F/U: HCPCS | Performed by: NURSE PRACTITIONER

## 2022-08-02 PROCEDURE — G8427 DOCREV CUR MEDS BY ELIG CLIN: HCPCS | Performed by: NURSE PRACTITIONER

## 2022-08-02 PROCEDURE — 1036F TOBACCO NON-USER: CPT | Performed by: NURSE PRACTITIONER

## 2022-08-02 PROCEDURE — 99203 OFFICE O/P NEW LOW 30 MIN: CPT | Performed by: NURSE PRACTITIONER

## 2022-08-02 PROCEDURE — 99203 OFFICE O/P NEW LOW 30 MIN: CPT

## 2022-08-02 PROCEDURE — 3017F COLORECTAL CA SCREEN DOC REV: CPT | Performed by: NURSE PRACTITIONER

## 2022-08-02 PROCEDURE — 1123F ACP DISCUSS/DSCN MKR DOCD: CPT | Performed by: NURSE PRACTITIONER

## 2022-08-02 RX ORDER — AMOXICILLIN AND CLAVULANATE POTASSIUM 875; 125 MG/1; MG/1
1 TABLET, FILM COATED ORAL 2 TIMES DAILY
Qty: 14 TABLET | Refills: 0 | Status: SHIPPED | OUTPATIENT
Start: 2022-08-02 | End: 2022-08-09

## 2022-08-02 RX ORDER — METHYLPREDNISOLONE SODIUM SUCCINATE 125 MG/2ML
125 INJECTION, POWDER, LYOPHILIZED, FOR SOLUTION INTRAMUSCULAR; INTRAVENOUS ONCE
Status: CANCELLED | OUTPATIENT
Start: 2022-08-02 | End: 2022-08-02

## 2022-08-02 RX ORDER — PREDNISONE 20 MG/1
20 TABLET ORAL 2 TIMES DAILY
Qty: 10 TABLET | Refills: 0 | Status: SHIPPED | OUTPATIENT
Start: 2022-08-02 | End: 2022-08-07

## 2022-08-02 ASSESSMENT — ENCOUNTER SYMPTOMS
FACIAL SWELLING: 1
STRIDOR: 0
SHORTNESS OF BREATH: 0
WHEEZING: 0

## 2022-08-02 NOTE — PROGRESS NOTES
Subjective:      Patient ID: Merced Flores is a 71 y.o. male coming in for   Chief Complaint   Patient presents with    Facial Swelling     Pt is experiencing swelling to the left side of his face. States he has not started any new medications or has had any life style changes. He states his upper gums are a little tender. Presents to  with swelling to left side of face and lips. Pt reports the swelling started today. He reports his upper gums are tender and swollen. Face is warm to touch. Denies any new exposures or medications. No fevers, no trouble swallowing. Review of Systems   Constitutional:  Negative for fever. HENT:  Positive for facial swelling. Respiratory:  Negative for shortness of breath, wheezing and stridor. Objective:  BP (!) 150/78 (Site: Left Upper Arm, Position: Sitting, Cuff Size: Medium Adult)   Pulse 73   Temp 97.2 °F (36.2 °C) (Infrared)   Ht 6' (1.829 m)   Wt 214 lb (97.1 kg)   SpO2 95%   BMI 29.02 kg/m²      Physical Exam  Vitals and nursing note reviewed. Constitutional:       General: He is not in acute distress. Appearance: Normal appearance. He is not ill-appearing. HENT:      Head:     Cardiovascular:      Rate and Rhythm: Normal rate and regular rhythm. Heart sounds: Normal heart sounds. Pulmonary:      Effort: Pulmonary effort is normal. No respiratory distress. Breath sounds: Normal breath sounds. No stridor. No wheezing, rhonchi or rales. Musculoskeletal:      Cervical back: Neck supple. Lymphadenopathy:      Cervical: No cervical adenopathy. Neurological:      General: No focal deficit present. Mental Status: He is alert and oriented to person, place, and time. Assessment:      1. Left facial swelling    2. Gingival pigmentation           Plan:    -augmentin and prednisone for facial swelling/erythema  -ENT referral placed for pigmentation abnormality of gums.      Orders Placed This Encounter   Procedures Layne Smith MD, Otolaryngology, [place]     Referral Priority:   Routine     Referral Type:   Eval and Treat     Referral Reason:   Specialty Services Required     Referred to Provider:   Zion Ocampo MD     Requested Specialty:   Otolaryngology     Number of Visits Requested:   1      Outpatient Encounter Medications as of 8/2/2022   Medication Sig Dispense Refill    amoxicillin-clavulanate (AUGMENTIN) 875-125 MG per tablet Take 1 tablet by mouth in the morning and 1 tablet before bedtime. Do all this for 7 days. 14 tablet 0    predniSONE (DELTASONE) 20 MG tablet Take 1 tablet by mouth in the morning and 1 tablet before bedtime. Do all this for 5 days. 10 tablet 0    metFORMIN (GLUCOPHAGE-XR) 500 MG extended release tablet TAKE 2 TABLETS BY MOUTH TWO TIMES A  tablet 3    lisinopril (PRINIVIL;ZESTRIL) 10 MG tablet TAKE 1 TABLET BY MOUTH EVERY DAY 90 tablet 3    Blood Pressure KIT Standard size adult arm cuff 1 kit 0    rosuvastatin (CRESTOR) 10 MG tablet Take 1 tablet by mouth daily 90 tablet 3    aspirin 81 MG EC tablet Take 81 mg by mouth daily      blood glucose test strips (ACCU-CHEK BYRON PLUS) strip TEST TWO TIMES DAILY FOR IRREGULAR BLOOD GLUCOSE 200 strip 3    Blood Glucose Monitoring Suppl (ONE TOUCH ULTRA 2) by Does not apply route. Tests blood sugar once a day       No facility-administered encounter medications on file as of 8/2/2022.             Bala Cotton, APRN - CNP

## 2022-08-04 ENCOUNTER — TELEPHONE (OUTPATIENT)
Dept: INTERNAL MEDICINE | Age: 72
End: 2022-08-04

## 2022-08-04 RX ORDER — GLIMEPIRIDE 2 MG/1
2 TABLET ORAL
Qty: 30 TABLET | Refills: 0 | Status: SHIPPED | OUTPATIENT
Start: 2022-08-04 | End: 2022-08-11 | Stop reason: ALTCHOICE

## 2022-08-04 NOTE — TELEPHONE ENCOUNTER
Last appt: 2/25/2022  Next appt: 9/1/2022    Patient was seen in urgent care the other day and they prescribed prednisone for facial swelling. Patient said since starting the medication his blood sugars have gone up to 270 in the morning. He is wondering should he stop the prednisone or what he should do for his sugar running high.

## 2022-08-04 NOTE — TELEPHONE ENCOUNTER
He can take glimepiride while taking steroids to compensate for the highs right now. Please stop glimepiride once blood sugars return to normal.     Glimepiride  2 mg daily with breakfast. Sent to [facility].

## 2022-08-08 ENCOUNTER — OFFICE VISIT (OUTPATIENT)
Dept: OTOLARYNGOLOGY | Age: 72
End: 2022-08-08
Payer: MEDICARE

## 2022-08-08 ENCOUNTER — HOSPITAL ENCOUNTER (OUTPATIENT)
Dept: LAB | Age: 72
Discharge: HOME | End: 2022-08-08
Payer: MEDICARE

## 2022-08-08 VITALS
BODY MASS INDEX: 29.17 KG/M2 | HEART RATE: 70 BPM | SYSTOLIC BLOOD PRESSURE: 130 MMHG | DIASTOLIC BLOOD PRESSURE: 70 MMHG | WEIGHT: 215.38 LBS | HEIGHT: 72 IN | OXYGEN SATURATION: 96 %

## 2022-08-08 DIAGNOSIS — M27.2 ODONTOGENIC INFECTION OF JAW: ICD-10-CM

## 2022-08-08 DIAGNOSIS — K13.70 ORAL LESION: Primary | ICD-10-CM

## 2022-08-08 DIAGNOSIS — L81.8 AMALGAM TATTOO, ORAL: ICD-10-CM

## 2022-08-08 DIAGNOSIS — E11.9 TYPE 2 DIABETES MELLITUS WITHOUT COMPLICATION, WITHOUT LONG-TERM CURRENT USE OF INSULIN (HCC): ICD-10-CM

## 2022-08-08 PROCEDURE — 99203 OFFICE O/P NEW LOW 30 MIN: CPT | Performed by: OTOLARYNGOLOGY

## 2022-08-08 PROCEDURE — 1036F TOBACCO NON-USER: CPT | Performed by: OTOLARYNGOLOGY

## 2022-08-08 PROCEDURE — 83036 HEMOGLOBIN GLYCOSYLATED A1C: CPT

## 2022-08-08 PROCEDURE — G8417 CALC BMI ABV UP PARAM F/U: HCPCS | Performed by: OTOLARYNGOLOGY

## 2022-08-08 PROCEDURE — 36415 COLL VENOUS BLD VENIPUNCTURE: CPT

## 2022-08-08 PROCEDURE — G8427 DOCREV CUR MEDS BY ELIG CLIN: HCPCS | Performed by: OTOLARYNGOLOGY

## 2022-08-08 PROCEDURE — 3017F COLORECTAL CA SCREEN DOC REV: CPT | Performed by: OTOLARYNGOLOGY

## 2022-08-08 PROCEDURE — 1123F ACP DISCUSS/DSCN MKR DOCD: CPT | Performed by: OTOLARYNGOLOGY

## 2022-08-08 PROCEDURE — 99212 OFFICE O/P EST SF 10 MIN: CPT | Performed by: OTOLARYNGOLOGY

## 2022-08-08 NOTE — PROGRESS NOTES
2022 1:44 PM EDT  Que Gomez (:  1950) is a 71 y.o. male,New patient, here for evaluation of the following chief complaint(s):  New Patient      ASSESSMENT/PLAN:  1. Oral lesion    2. Amalgam tattoo, oral    3. Odontogenic infection of jaw      1. Oral lesion  2. Amalgam tattoo, oral  3. Odontogenic infection of jaw  History is most consistent with odontogenic infection, resolved  Patient is resistant to pursue further dental work at this time due to financial issues and is looking to touch base with dentist again early in . Discussed that ongoing infection issues may warrant earlier evaluation. Right buccal mucosal lesion most consistent with a venous lake type lesion. Left buccal mucosa lesion is most consistent with a dental amalgam tattoo. Discussed that this may represent an mucosal melanoma and should be monitored. Patient states that the dental office have never commented on an oral lesion. Evidence of bluish discoloration around the base of several teeth. Extensive filling history. Follow-up in 3 months to recheck the lesion. Discussed possible biopsy if there are any changes. Recommend follow-up with dentist to also specifically addressed the possibility of a dental amalgam tattoo. No follow-ups on file. SUBJECTIVE/OBJECTIVE:  HPI  78-year-old man who presented to urgent care on  with a 2-day history of pain associated with the left central incisor that has recently had a crown put on it without a root canal.  He woke up in the morning of  with facial swelling associated with the left upper lip, eye, cheek. He was treated with Augmentin and prednisone. He has been monitoring his blood glucose levels. The swelling and pain have resolved. At the time of the evaluation he was noted to have 2 lesions of the posterior buccal mucosa and was referred for further evaluation. He sees Dr. Lisa Isaac.      Past Medical History:   Diagnosis Date    Anemia     minimal anemia, hemoglobin 13.6    Benign prostatic hypertrophy     Cyst in hand     distal phalanx, mid finger left hand    Hyperlipidemia     Hypertension     Keratosis     right forehead    Overweight(278.02)     Seborrhea     Type II or unspecified type diabetes mellitus without mention of complication, not stated as uncontrolled      Past Surgical History:   Procedure Laterality Date    COLONOSCOPY N/A 9/14/2020    diffuse severe diverticulosis, TA, hyperplastic polyp.  at [facility]     Social History       Tobacco History       Smoking Status  Former Quit Date  12/9/2000 Smoking Frequency  3.00 packs/day for 20.00 years (60.00 pk-yrs) Smoking Tobacco Type  Cigarettes quit in 12/9/2000      Smokeless Tobacco Use  Never              Alcohol History       Alcohol Use Status  Yes Comment  no alcohol ingestion after a heavier intake eearlier in his life              Drug Use       Drug Use Status  No              Sexual Activity       Sexually Active  Not Asked                  Family History   Problem Relation Age of Onset    Diabetes Mother     Cancer Father         throat cancer    Stroke Father     Cancer Brother 64        pancreatic    Cancer Paternal Uncle         lung     Current Outpatient Medications   Medication Instructions    amoxicillin-clavulanate (AUGMENTIN) 875-125 MG per tablet 1 tablet, Oral, 2 TIMES DAILY    aspirin 81 mg, Oral, DAILY    Blood Glucose Monitoring Suppl (ONE TOUCH ULTRA 2) by Does not apply route.  Tests blood sugar once a day    blood glucose test strips (ACCU-CHEK BYRON PLUS) strip TEST TWO TIMES DAILY FOR IRREGULAR BLOOD GLUCOSE    Blood Pressure KIT Standard size adult arm cuff    glimepiride (AMARYL) 2 mg, Oral, DAILY BEFORE BREAKFAST    lisinopril (PRINIVIL;ZESTRIL) 10 MG tablet TAKE 1 TABLET BY MOUTH EVERY DAY    metFORMIN (GLUCOPHAGE-XR) 500 MG extended release tablet TAKE 2 TABLETS BY MOUTH TWO TIMES A DAY    rosuvastatin (CRESTOR) 10 mg, Oral, DAILY     No Known Allergies    ENT ROS: positive for -oral lesions    General: The patient is found to be alert and normally responsive male. Hearing: grossly normal   Voice: Clear   Skin: The skin has normal colour and turgor. Face: The facial contour is symmetric at rest and with movement. Ears: The pinnae have normal contours. AD: EAC clear, TM intact, no effusion/erythema/retraction   AS: EAC clear, TM intact, no effusion/erythema/retraction   Eye: The ocular movements are full and symmetric, the conjunctiva is unremarkable; sclera are anicteric, pupillary response is symmetric. No nystagmus is found. Nose:   The external nasal contour is normal  The nasal mucosa has a normal appearance. The nasal septum is straight. The turbinates have a normal appearance  The nares are patent without evidence of polyposis   Oral cavity:   The dentition is healthy. The oral mucosa right posterior buccal mucosa has a raised, well-circumscribed, nonulcerative lesion of deep venous coloration most consistent with venous lake; left posterior buccal mucosa has irregular deep purple discoloration directly adjacent to the posterior distal aspect of the most distal mandibular molar which has extensive silver filling material;  the tongue is symmetric with full mobility and is without fasciculation. The soft palate is symmetric. The oropharynx is unremarkable. Neck: The neck has a normal contour; no masses are found on palpation        An electronic signature was used to authenticate this note.     --Jaylin Gutierrez MD     8/8/2022 1:44 PM EDT

## 2022-08-09 LAB
ESTIMATED AVERAGE GLUCOSE: 154 MG/DL
HBA1C MFR BLD: 7 % (ref 4–6)

## 2022-08-11 ENCOUNTER — OFFICE VISIT (OUTPATIENT)
Dept: DIABETES SERVICES | Age: 72
End: 2022-08-11
Payer: MEDICARE

## 2022-08-11 VITALS
RESPIRATION RATE: 16 BRPM | SYSTOLIC BLOOD PRESSURE: 130 MMHG | DIASTOLIC BLOOD PRESSURE: 72 MMHG | HEART RATE: 64 BPM | BODY MASS INDEX: 28.85 KG/M2 | HEIGHT: 72 IN | WEIGHT: 213 LBS

## 2022-08-11 DIAGNOSIS — E11.9 TYPE 2 DIABETES MELLITUS WITHOUT COMPLICATION, WITHOUT LONG-TERM CURRENT USE OF INSULIN (HCC): Primary | ICD-10-CM

## 2022-08-11 DIAGNOSIS — E78.49 OTHER HYPERLIPIDEMIA: ICD-10-CM

## 2022-08-11 DIAGNOSIS — I10 ESSENTIAL HYPERTENSION: ICD-10-CM

## 2022-08-11 PROCEDURE — 2022F DILAT RTA XM EVC RTNOPTHY: CPT | Performed by: NURSE PRACTITIONER

## 2022-08-11 PROCEDURE — 99211 OFF/OP EST MAY X REQ PHY/QHP: CPT | Performed by: NURSE PRACTITIONER

## 2022-08-11 PROCEDURE — 3051F HG A1C>EQUAL 7.0%<8.0%: CPT | Performed by: NURSE PRACTITIONER

## 2022-08-11 PROCEDURE — G8417 CALC BMI ABV UP PARAM F/U: HCPCS | Performed by: NURSE PRACTITIONER

## 2022-08-11 PROCEDURE — G8427 DOCREV CUR MEDS BY ELIG CLIN: HCPCS | Performed by: NURSE PRACTITIONER

## 2022-08-11 PROCEDURE — 99214 OFFICE O/P EST MOD 30 MIN: CPT | Performed by: NURSE PRACTITIONER

## 2022-08-11 PROCEDURE — 3017F COLORECTAL CA SCREEN DOC REV: CPT | Performed by: NURSE PRACTITIONER

## 2022-08-11 PROCEDURE — 1123F ACP DISCUSS/DSCN MKR DOCD: CPT | Performed by: NURSE PRACTITIONER

## 2022-08-11 PROCEDURE — 1036F TOBACCO NON-USER: CPT | Performed by: NURSE PRACTITIONER

## 2022-08-11 RX ORDER — BLOOD SUGAR DIAGNOSTIC
STRIP MISCELLANEOUS
Qty: 200 STRIP | Refills: 3 | Status: SHIPPED | OUTPATIENT
Start: 2022-08-11

## 2022-08-11 RX ORDER — GLIMEPIRIDE 2 MG/1
2 TABLET ORAL
Qty: 90 TABLET | Refills: 3 | Status: CANCELLED | OUTPATIENT
Start: 2022-08-11

## 2022-08-11 ASSESSMENT — ENCOUNTER SYMPTOMS
SHORTNESS OF BREATH: 0
ABDOMINAL PAIN: 0
DIARRHEA: 0
RESPIRATORY NEGATIVE: 1

## 2022-08-11 NOTE — PROGRESS NOTES
[address], [address]  [facility] [id number] [phone]        HISTORY:    Aranza Delgado presents today for evaluation and management of:  Chief Complaint   Patient presents with    Diabetes     3 month appt         Interval History:    Past DM Medications   Glimepiride-therapy completed  Starlix-therapy completed  Ozempic-cost     Current Diabetic Medications  Metformin 1000 mg bid     DKA episodes: 0      05/05/22   Aranza Delgado is a 80 y.o. male patient who presents to clinic today for his diabetes. he has a history of HTN, hyperlipidemia, PAD and obesity which contributes to his diabetes. At previous visit diabetes counseling was provided. he denies any current signs or symptoms of hyper/hypoglycemia. he states they are taking their medications as prescribed without any adverse effects. He has a history of PAD. He does complain of leg pain while legs are elevated and very cold feeling. He denies swelling. He does complain of neuropathy. He states he was seen by a general surgeon and was told from a scan he had a clot in the back of his knee. He was referred to vascular and had a carotid and LUCY there was no follow up and pt would like a second opinion. Diet: low carb  Exercise: none  BS testing: once daily average 130  Issues: denies  Diabetic foot exam up-to-date: Yes  Diabetic retinal exam up-to-date: Yes  Hypoglycemia as needed treatment: snack    08/11/22   Aranza Delgado is a 71 y.o. male patient who presents to clinic today for his diabetes. he has a history of HTN, hyperlipidemia, PAD and obesity which contributes to his diabetes. At previous visit diabetes counseling was provided. he denies any current signs or symptoms of hyper/hypoglycemia. he states they are taking their medications as prescribed without any adverse effects. He was recently on antibiotics and steroids.  Blood sugars were running high, glimepiride was started briefly but stopped due to hypoglycemia. Diet: low carb  Exercise: none  BS testing: once daily average 140  Issues: denies   Diabetic foot exam up-to-date: Yes  Diabetic retinal exam up-to-date: Yes  Hypoglycemia as needed treatment: snack      High cholesterol-  Takes crestor and denies any adverse effects with its use. Watches diet and exercise. Hypertension-  Takes lisinopril and denies any adverse effects with their use. Watches diet and exercise. Denies any chest pain, dizziness or edema.         Past Medical History:   Diagnosis Date    Anemia     minimal anemia, hemoglobin 13.6    Benign prostatic hypertrophy     Cyst in hand     distal phalanx, mid finger left hand    Hyperlipidemia     Hypertension     Keratosis     right forehead    Overweight(278.02)     Seborrhea     Type II or unspecified type diabetes mellitus without mention of complication, not stated as uncontrolled      Family History   Problem Relation Age of Onset    Diabetes Mother     Cancer Father         throat cancer    Stroke Father     Cancer Brother 64        pancreatic    Cancer Paternal Uncle         lung     Social History     Tobacco Use    Smoking status: Former     Packs/day: 3.00     Years: 20.00     Pack years: 60.00     Types: Cigarettes     Quit date: 2000     Years since quittin.6    Smokeless tobacco: Never   Vaping Use    Vaping Use: Never used   Substance Use Topics    Alcohol use: Yes     Comment: no alcohol ingestion after a heavier intake eearlier in his life    Drug use: No     No Known Allergies    MEDICATIONS:  Current Outpatient Medications   Medication Sig Dispense Refill    blood glucose test strips (ACCU-CHEK BYRON PLUS) strip TEST TWO TIMES DAILY FOR IRREGULAR BLOOD GLUCOSE 200 strip 3    metFORMIN (GLUCOPHAGE-XR) 500 MG extended release tablet TAKE 2 TABLETS BY MOUTH TWO TIMES A  tablet 3    lisinopril (PRINIVIL;ZESTRIL) 10 MG tablet TAKE 1 TABLET BY MOUTH EVERY DAY 90 tablet 3    rosuvastatin (CRESTOR) 10 MG tablet Take 1 tablet by mouth daily 90 tablet 3    aspirin 81 MG EC tablet Take 81 mg by mouth daily      Blood Glucose Monitoring Suppl (ONE TOUCH ULTRA 2) by Does not apply route. Tests blood sugar once a day       No current facility-administered medications for this visit. Review ofSymptoms:  Review of Systems   Constitutional:  Positive for fatigue. Negative for unexpected weight change. Eyes:  Negative for visual disturbance. Respiratory: Negative. Negative for shortness of breath. Cardiovascular:  Negative for chest pain and leg swelling. Gastrointestinal:  Negative for abdominal pain and diarrhea. Endocrine: Negative for polydipsia, polyphagia and polyuria. Genitourinary: Negative. Musculoskeletal: Negative. Skin:  Negative for rash and wound. Neurological:  Negative for dizziness, tremors, seizures and headaches. Psychiatric/Behavioral: Negative. Negative for confusion and decreased concentration. Theremainder of a complete 14-point review of systems is negative. Vital Signs: /72 (Site: Right Upper Arm, Position: Sitting, Cuff Size: Large Adult)   Pulse 64   Resp 16   Ht 6' (1.829 m)   Wt 213 lb (96.6 kg)   BMI 28.89 kg/m²      Wt Readings from Last 3 Encounters:   08/11/22 213 lb (96.6 kg)   08/08/22 215 lb 6 oz (97.7 kg)   08/02/22 214 lb (97.1 kg)     Body mass index is 28.89 kg/m².   LABS:  Hemoglobin A1C   Date Value Ref Range Status   08/08/2022 7.0 (H) 4.0 - 6.0 % Final   05/05/2022 6.8 (H) 4.0 - 6.0 % Final     Lab Results   Component Value Date    LABMICR Can not be calculated 05/05/2022     Lab Results   Component Value Date     05/05/2022    K 4.1 05/05/2022     05/05/2022    CO2 28 05/05/2022    BUN 21 05/05/2022    CREATININE 0.96 05/05/2022    GLUCOSE 141 (H) 05/05/2022    CALCIUM 9.1 05/05/2022    PROT 6.5 07/26/2021    LABALBU 4.3 07/26/2021    BILITOT 0.81 07/26/2021    ALKPHOS 56 07/26/2021    AST 17 07/26/2021    ALT 17 07/26/2021    LABGLOM >60 05/05/2022    GFRAA >60 05/05/2022     Lab Results   Component Value Date    CHOL 93 07/26/2021    CHOL 99 07/23/2020    CHOL 101 07/16/2019     Lab Results   Component Value Date    TRIG 93 07/26/2021    TRIG 91 07/23/2020    TRIG 98 07/16/2019     Lab Results   Component Value Date    HDL 38 (L) 07/26/2021    HDL 29 (L) 07/23/2020    HDL 30 (L) 07/16/2019     Lab Results   Component Value Date    LDLCHOLESTEROL 36 07/26/2021    LDLCHOLESTEROL 52 07/23/2020    LDLCHOLESTEROL 51 07/16/2019     Lab Results   Component Value Date    VLDL NOT REPORTED 07/26/2021    VLDL NOT REPORTED 07/23/2020    VLDL NOT REPORTED 07/16/2019     Lab Results   Component Value Date    CHOLHDLRATIO 2.4 07/26/2021    CHOLHDLRATIO 3.4 07/23/2020    CHOLHDLRATIO 3.4 07/16/2019           Physical Exam  Constitutional:       Appearance: Normal appearance. He is well-developed. HENT:      Head: Normocephalic. Eyes:      Pupils: Pupils are equal, round, and reactive to light. Cardiovascular:      Rate and Rhythm: Normal rate and regular rhythm. Pulmonary:      Effort: Pulmonary effort is normal.      Breath sounds: Normal breath sounds. Musculoskeletal:         General: Normal range of motion. Cervical back: Normal range of motion. Skin:     General: Skin is warm and dry. Findings: No lesion (no lipohypertrophy) or rash. Neurological:      Mental Status: He is alert and oriented to person, place, and time. Mental status is at baseline. Sensory: No sensory deficit. Psychiatric:         Mood and Affect: Mood normal.         Speech: Speech normal.         Behavior: Behavior normal.         Thought Content: Thought content normal.         Judgment: Judgment normal.       ASSESSMENT/PLAN:     Diagnosis Orders   1.  Type 2 diabetes mellitus without complication, without long-term current use of insulin (HCC)  blood glucose test amutations . They also need a dilated eye exam yearly. We discussed the issues of diet, exercise, medication, complication avoidance, reviewed the signs and symptoms of diabetes, hypoglycemic episodes, significance of HbA1C.     - blood glucose test strips (ACCU-CHEK BYRON PLUS) strip; TEST TWO TIMES DAILY FOR IRREGULAR BLOOD GLUCOSE  Dispense: 200 strip; Refill: 3  - Hemoglobin A1C; Future  - Ambulatory referral to Podiatry    2. Other hyperlipidemia  stable, lipid panel reviewed, continue current medications. Diet and exercise      3. Essential hypertension   stable, continue current medications. Diet and exercise Seek emergent care if chest pain develops. Answered all patient questions. Agrees to follow plan of care and to follow up in 3 months, sooner if needed. Call office if unexplained blood sugars less than 70 occur or above 400. Call office or access [facility] with any further questions or concerns. Be sure to bring glucometer/food log at next appointment. Total time spent reviewing chart, labs, counseling patient and documenting on the date of the encounter: 30 min.       Electronically signed by AXEL Strong CNP on 8/11/2022 at 8:32 AM      (Please note that portions of this note were completed with a voice-recognition program. Efforts were made to edit the dictation but occasionally words are mis-transcribed.)

## 2022-08-15 DIAGNOSIS — E78.49 OTHER HYPERLIPIDEMIA: ICD-10-CM

## 2022-08-15 RX ORDER — ROSUVASTATIN CALCIUM 10 MG/1
TABLET, COATED ORAL
Qty: 90 TABLET | Refills: 3 | Status: SHIPPED | OUTPATIENT
Start: 2022-08-15

## 2022-08-25 ENCOUNTER — OFFICE VISIT (OUTPATIENT)
Dept: PODIATRY | Age: 72
End: 2022-08-25
Payer: MEDICARE

## 2022-08-25 ENCOUNTER — HOSPITAL ENCOUNTER (OUTPATIENT)
Dept: LAB | Age: 72
Discharge: HOME | End: 2022-08-25
Payer: MEDICARE

## 2022-08-25 VITALS
BODY MASS INDEX: 29 KG/M2 | WEIGHT: 213.8 LBS | HEART RATE: 68 BPM | RESPIRATION RATE: 20 BRPM | SYSTOLIC BLOOD PRESSURE: 128 MMHG | DIASTOLIC BLOOD PRESSURE: 68 MMHG

## 2022-08-25 DIAGNOSIS — E78.49 OTHER HYPERLIPIDEMIA: ICD-10-CM

## 2022-08-25 DIAGNOSIS — I10 PRIMARY HYPERTENSION: ICD-10-CM

## 2022-08-25 DIAGNOSIS — Z12.5 SPECIAL SCREENING FOR MALIGNANT NEOPLASM OF PROSTATE: ICD-10-CM

## 2022-08-25 DIAGNOSIS — L97.521 SKIN ULCER OF TOE OF LEFT FOOT, LIMITED TO BREAKDOWN OF SKIN (HCC): Primary | ICD-10-CM

## 2022-08-25 DIAGNOSIS — M20.40 HAMMER TOE, UNSPECIFIED LATERALITY: ICD-10-CM

## 2022-08-25 DIAGNOSIS — E11.9 TYPE 2 DIABETES MELLITUS WITHOUT COMPLICATION, WITHOUT LONG-TERM CURRENT USE OF INSULIN (HCC): ICD-10-CM

## 2022-08-25 DIAGNOSIS — Z00.00 GENERAL MEDICAL EXAM: ICD-10-CM

## 2022-08-25 DIAGNOSIS — L03.032 CELLULITIS OF TOE OF LEFT FOOT: ICD-10-CM

## 2022-08-25 DIAGNOSIS — D50.9 IRON DEFICIENCY ANEMIA, UNSPECIFIED IRON DEFICIENCY ANEMIA TYPE: ICD-10-CM

## 2022-08-25 LAB
ALBUMIN SERPL-MCNC: 4.3 G/DL (ref 3.5–5.2)
ALBUMIN/GLOBULIN RATIO: 1.9 (ref 1–2.5)
ALP BLD-CCNC: 68 U/L (ref 40–129)
ALT SERPL-CCNC: 14 U/L (ref 5–41)
ANION GAP SERPL CALCULATED.3IONS-SCNC: 7 MMOL/L (ref 9–17)
AST SERPL-CCNC: 12 U/L
BILIRUB SERPL-MCNC: 0.4 MG/DL (ref 0.3–1.2)
BUN BLDV-MCNC: 20 MG/DL (ref 8–23)
BUN/CREAT BLD: 23 (ref 9–20)
CALCIUM SERPL-MCNC: 9.2 MG/DL (ref 8.6–10.4)
CHLORIDE BLD-SCNC: 105 MMOL/L (ref 98–107)
CHOLESTEROL/HDL RATIO: 2.8
CHOLESTEROL: 95 MG/DL
CO2: 28 MMOL/L (ref 20–31)
CREAT SERPL-MCNC: 0.86 MG/DL (ref 0.7–1.2)
CREATININE URINE: 93.6 MG/DL (ref 39–259)
GFR AFRICAN AMERICAN: >60 ML/MIN
GFR NON-AFRICAN AMERICAN: >60 ML/MIN
GFR SERPL CREATININE-BSD FRML MDRD: ABNORMAL ML/MIN/{1.73_M2}
GLUCOSE BLD-MCNC: 149 MG/DL (ref 70–99)
HDLC SERPL-MCNC: 34 MG/DL
HEMOGLOBIN: 13 G/DL (ref 13–17)
IRON SATURATION: 29 % (ref 20–55)
IRON: 76 UG/DL (ref 59–158)
LDL CHOLESTEROL: 48 MG/DL (ref 0–130)
MICROALBUMIN/CREAT 24H UR: <12 MG/L
MICROALBUMIN/CREAT UR-RTO: NORMAL MCG/MG CREAT
POTASSIUM SERPL-SCNC: 4.8 MMOL/L (ref 3.7–5.3)
PROSTATE SPECIFIC ANTIGEN: 1.06 NG/ML
SODIUM BLD-SCNC: 140 MMOL/L (ref 135–144)
TOTAL IRON BINDING CAPACITY: 259 UG/DL (ref 250–450)
TOTAL PROTEIN: 6.6 G/DL (ref 6.4–8.3)
TRIGL SERPL-MCNC: 64 MG/DL
UNSATURATED IRON BINDING CAPACITY: 183 UG/DL (ref 112–347)

## 2022-08-25 PROCEDURE — 83540 ASSAY OF IRON: CPT

## 2022-08-25 PROCEDURE — 99214 OFFICE O/P EST MOD 30 MIN: CPT | Performed by: PODIATRIST

## 2022-08-25 PROCEDURE — 83550 IRON BINDING TEST: CPT

## 2022-08-25 PROCEDURE — 2022F DILAT RTA XM EVC RTNOPTHY: CPT | Performed by: PODIATRIST

## 2022-08-25 PROCEDURE — 36415 COLL VENOUS BLD VENIPUNCTURE: CPT

## 2022-08-25 PROCEDURE — 97597 DBRDMT OPN WND 1ST 20 CM/<: CPT | Performed by: PODIATRIST

## 2022-08-25 PROCEDURE — G0103 PSA SCREENING: HCPCS

## 2022-08-25 PROCEDURE — G8427 DOCREV CUR MEDS BY ELIG CLIN: HCPCS | Performed by: PODIATRIST

## 2022-08-25 PROCEDURE — 85018 HEMOGLOBIN: CPT

## 2022-08-25 PROCEDURE — G8417 CALC BMI ABV UP PARAM F/U: HCPCS | Performed by: PODIATRIST

## 2022-08-25 PROCEDURE — 80061 LIPID PANEL: CPT

## 2022-08-25 PROCEDURE — 80053 COMPREHEN METABOLIC PANEL: CPT

## 2022-08-25 PROCEDURE — 1036F TOBACCO NON-USER: CPT | Performed by: PODIATRIST

## 2022-08-25 PROCEDURE — L3260 AMBULATORY SURGICAL BOOT EAC: HCPCS | Performed by: PODIATRIST

## 2022-08-25 PROCEDURE — 3051F HG A1C>EQUAL 7.0%<8.0%: CPT | Performed by: PODIATRIST

## 2022-08-25 PROCEDURE — 82570 ASSAY OF URINE CREATININE: CPT

## 2022-08-25 PROCEDURE — 99203 OFFICE O/P NEW LOW 30 MIN: CPT | Performed by: PODIATRIST

## 2022-08-25 PROCEDURE — 3017F COLORECTAL CA SCREEN DOC REV: CPT | Performed by: PODIATRIST

## 2022-08-25 PROCEDURE — 82043 UR ALBUMIN QUANTITATIVE: CPT

## 2022-08-25 PROCEDURE — 1123F ACP DISCUSS/DSCN MKR DOCD: CPT | Performed by: PODIATRIST

## 2022-08-25 RX ORDER — CEPHALEXIN 500 MG/1
500 CAPSULE ORAL 3 TIMES DAILY
Qty: 30 CAPSULE | Refills: 0 | Status: SHIPPED | OUTPATIENT
Start: 2022-08-25 | End: 2022-09-04

## 2022-08-25 NOTE — PROGRESS NOTES
Subjective:    Tee Weber is a 71 y.o. male who presents with possible corn left toe. Patient not know he had the ulceration of the toe. Patient has not been compliant with off loading. Started 2 to 3 months ago. Patient relates pain is Absent . Pain is rated 0 out of 10 and is described as none. Currently denies F/C/N/V. Overall diabetic control is worsened. No Known Allergies    Past Medical History:   Diagnosis Date    Anemia     minimal anemia, hemoglobin 13.6    Benign prostatic hypertrophy     Cyst in hand     distal phalanx, mid finger left hand    Hyperlipidemia     Hypertension     Keratosis     right forehead    Overweight(278.02)     Seborrhea     Type II or unspecified type diabetes mellitus without mention of complication, not stated as uncontrolled        Prior to Admission medications    Medication Sig Start Date End Date Taking? Authorizing Provider   cephALEXin (KEFLEX) 500 MG capsule Take 1 capsule by mouth 3 times daily for 10 days 22 Yes Dillan Mena DPM   rosuvastatin (CRESTOR) 10 MG tablet TAKE 1 TABLET BY MOUTH EVERY DAY 8/15/22  Yes Darryn Feliciano MD   blood glucose test strips (ACCU-CHEK BYRON PLUS) strip TEST TWO TIMES DAILY FOR IRREGULAR BLOOD GLUCOSE 22  Yes Keke Urena, APRN - CNP   metFORMIN (GLUCOPHAGE-XR) 500 MG extended release tablet TAKE 2 TABLETS BY MOUTH TWO TIMES A DAY 22  Yes Darryn Feliciano MD   lisinopril (PRINIVIL;ZESTRIL) 10 MG tablet TAKE 1 TABLET BY MOUTH EVERY DAY 22  Yes Darryn Feliciano MD   aspirin 81 MG EC tablet Take 81 mg by mouth daily   Yes Historical Provider, MD   Blood Glucose Monitoring Suppl (ONE TOUCH ULTRA 2) by Does not apply route.  Tests blood sugar once a day   Yes Historical Provider, MD       Social History     Tobacco Use    Smoking status: Former     Packs/day: 3.00     Years: 20.00     Pack years: 60.00     Types: Cigarettes     Quit date: 2000     Years since quittin.7 Smokeless tobacco: Never   Substance Use Topics    Alcohol use: Yes     Comment: no alcohol ingestion after a heavier intake lizzy in his life       ROS: All 14 ROS systems reviewed and pertinent positives noted above, all others negative. Lower Extremity Physical Examination:     Vitals:   Vitals:    08/25/22 0820   BP: 128/68   Pulse: 68   Resp: 20     General: AAO x 3 in NAD. Vascular: DP and PT pulses palpable 2/4, bilateral.  CFT <3 seconds, bilateral.  Hair growth absent to the level of the digits, bilateral.  Edema present, bilateral.  Varicosities present, bilateral. Erythema present L 4th toe. Distal Rubor absent bilateral.  Temperature decreased bilateral. Hyperpigmentation present bilateral. Atrophic skin no  Neurological: Sensation Impaired to light touch to level of digits, bilateral.  Protective sensation intact  0/10 sites via 5.07/10g [place] Monofilament, bilateral.  negative Tinel's, bilateral.  negative Valleix sign, bilateral.  Vibratory abnormal  bilateral.  Reflexes Decreased bilateral.  Paresthesias positive. Dysthesias negative. Sharp/dull abnormal  bilateral.      Musculoskeletal: Muscle strength 5/5, bilateral.  Pain absent upon palpation bilateral. Normal medial longitudinal arch, bilateral.  Ankle ROM within normal limits,bilateral.  1st MPJ ROM within normal limits, bilateral.  Dorsally contracted digits present. No other foot deformities. Integument:   Open lesion present, Left. ,  Ulcer dorsal lateral left fourth PIPJ 0.6 x 0.5 x 0.2 cm  ,with positive callus and fibrin  no probing no undermining no malodor. ELVIE ulcer edema and erythema seen going back to MPJ level. Interdigital maceration absent to web spaces , Bilateral.  Nails b/lthickened, dystrophic and crumbly, discolored with subungual debris. Fissures absent, Bilateral.     Asessment: Patient is a 71 y.o. male with:    Diagnosis Orders   1.  Skin ulcer of toe of left foot, limited to breakdown of skin [facility]   DIABETES FOOT EXAM    WV DEBRIDEMENT OPEN WOUND 20 SQ CM<      2. DM type 2, uncontrolled, with neuropathy (HCC)   DIABETES FOOT EXAM    WV DEBRIDEMENT OPEN WOUND 20 SQ CM<      3. Hammer toe, unspecified laterality   DIABETES FOOT EXAM      4. Cellulitis of toe of left foot   DIABETES FOOT EXAM            Ulcer Classification:  Diabetic ulcer grade 1 B. IDSA  mild infection. Plan:   Patient examined and evaluated. Diabetic foot education and exam.  Current condition and treatment options discussed in detail. Topical lidocaine applied to wound. Debridement Active wound management took place 100% non excisional with the use of  tissue nippers. All non viable tissue (including epidermis and/or dermis) and bio burden was removed to promote healing. Bleeding was present. Please see chart for exact measurements, if not documented then size was less than 20 sq cm. .  Minimal blood loss noted and hemostasis was obtained with direct pressure. Patient related pain absent post debridement. Patient advised importance of overall diabetic control and will continue offloading as advised and stressed the importance of this in regards to wound healing. Today's dressing:Other: collagen. Orders Placed This Encounter   Medications    cephALEXin (KEFLEX) 500 MG capsule     Sig: Take 1 capsule by mouth 3 times daily for 10 days     Dispense:  30 capsule     Refill:  0       Due to level of pain/deformity/condition, it is in my medical opinion that patient will benefit from and is medically necessary for offloading device at this time. Patient was dispensed a surgical shoe  to wear 100% of the time WB. Patient was educated on appropriate use of the device and the need to be compliant with offloading therapy. Patient understands that non compliance with the device will be detrimental to the healing of the condition/ulceration. Patient needs the device to help support the foot and reduce pain.   This device is medically necessary due to above listed diagnosis. Contact clinic with any questions/problems/concerns or new signs of infection. Follow-up at [facility] in 1week(s).

## 2022-08-25 NOTE — PATIENT INSTRUCTIONS
Use collagen dressing to left 4th toe and cover with a dry dressing. Use surgical shoe at all times on the left foot. Return to see Shante Hirsch as scheduled next week.  medication as prescribed by . Call the office with any questions or concerns at [phone].

## 2022-09-01 ENCOUNTER — OFFICE VISIT (OUTPATIENT)
Dept: INTERNAL MEDICINE | Age: 72
End: 2022-09-01
Payer: MEDICARE

## 2022-09-01 ENCOUNTER — OFFICE VISIT (OUTPATIENT)
Dept: PODIATRY | Age: 72
End: 2022-09-01
Payer: MEDICARE

## 2022-09-01 VITALS
WEIGHT: 211.6 LBS | RESPIRATION RATE: 20 BRPM | BODY MASS INDEX: 28.7 KG/M2 | DIASTOLIC BLOOD PRESSURE: 70 MMHG | TEMPERATURE: 97.9 F | SYSTOLIC BLOOD PRESSURE: 124 MMHG | HEART RATE: 76 BPM

## 2022-09-01 VITALS
DIASTOLIC BLOOD PRESSURE: 70 MMHG | HEART RATE: 76 BPM | RESPIRATION RATE: 16 BRPM | BODY MASS INDEX: 28.58 KG/M2 | SYSTOLIC BLOOD PRESSURE: 124 MMHG | WEIGHT: 211 LBS | HEIGHT: 72 IN

## 2022-09-01 DIAGNOSIS — E11.9 TYPE 2 DIABETES MELLITUS WITHOUT COMPLICATION, WITHOUT LONG-TERM CURRENT USE OF INSULIN (HCC): ICD-10-CM

## 2022-09-01 DIAGNOSIS — E78.49 OTHER HYPERLIPIDEMIA: ICD-10-CM

## 2022-09-01 DIAGNOSIS — I10 PRIMARY HYPERTENSION: Primary | ICD-10-CM

## 2022-09-01 DIAGNOSIS — L97.521 SKIN ULCER OF TOE OF LEFT FOOT, LIMITED TO BREAKDOWN OF SKIN (HCC): Primary | ICD-10-CM

## 2022-09-01 DIAGNOSIS — D50.9 IRON DEFICIENCY ANEMIA, UNSPECIFIED IRON DEFICIENCY ANEMIA TYPE: ICD-10-CM

## 2022-09-01 PROCEDURE — 97597 DBRDMT OPN WND 1ST 20 CM/<: CPT | Performed by: PODIATRIST

## 2022-09-01 PROCEDURE — G8417 CALC BMI ABV UP PARAM F/U: HCPCS | Performed by: INTERNAL MEDICINE

## 2022-09-01 PROCEDURE — G8427 DOCREV CUR MEDS BY ELIG CLIN: HCPCS | Performed by: INTERNAL MEDICINE

## 2022-09-01 PROCEDURE — 99212 OFFICE O/P EST SF 10 MIN: CPT | Performed by: PODIATRIST

## 2022-09-01 PROCEDURE — 99999 PR OFFICE/OUTPT VISIT,PROCEDURE ONLY: CPT | Performed by: PODIATRIST

## 2022-09-01 PROCEDURE — 99214 OFFICE O/P EST MOD 30 MIN: CPT | Performed by: INTERNAL MEDICINE

## 2022-09-01 PROCEDURE — 3051F HG A1C>EQUAL 7.0%<8.0%: CPT | Performed by: INTERNAL MEDICINE

## 2022-09-01 PROCEDURE — 1036F TOBACCO NON-USER: CPT | Performed by: INTERNAL MEDICINE

## 2022-09-01 PROCEDURE — 2022F DILAT RTA XM EVC RTNOPTHY: CPT | Performed by: INTERNAL MEDICINE

## 2022-09-01 PROCEDURE — 1123F ACP DISCUSS/DSCN MKR DOCD: CPT | Performed by: INTERNAL MEDICINE

## 2022-09-01 PROCEDURE — 3017F COLORECTAL CA SCREEN DOC REV: CPT | Performed by: INTERNAL MEDICINE

## 2022-09-01 SDOH — ECONOMIC STABILITY: FOOD INSECURITY: WITHIN THE PAST 12 MONTHS, YOU WORRIED THAT YOUR FOOD WOULD RUN OUT BEFORE YOU GOT MONEY TO BUY MORE.: NEVER TRUE

## 2022-09-01 SDOH — ECONOMIC STABILITY: FOOD INSECURITY: WITHIN THE PAST 12 MONTHS, THE FOOD YOU BOUGHT JUST DIDN'T LAST AND YOU DIDN'T HAVE MONEY TO GET MORE.: NEVER TRUE

## 2022-09-01 ASSESSMENT — ENCOUNTER SYMPTOMS
ABDOMINAL PAIN: 0
DIARRHEA: 0
SHORTNESS OF BREATH: 0
EYE PAIN: 0
BACK PAIN: 0
BLOOD IN STOOL: 0
COUGH: 0
VOMITING: 0
NAUSEA: 0
CONSTIPATION: 0

## 2022-09-01 ASSESSMENT — SOCIAL DETERMINANTS OF HEALTH (SDOH): HOW HARD IS IT FOR YOU TO PAY FOR THE VERY BASICS LIKE FOOD, HOUSING, MEDICAL CARE, AND HEATING?: NOT HARD AT ALL

## 2022-09-01 NOTE — PATIENT INSTRUCTIONS
Continue with collagen dressing then cover with a dry dressing. Continue to wear surgical shoe at all times on the left foot    Return to see Familia Alatorre next week as scheduled.

## 2022-09-01 NOTE — PROGRESS NOTES
[place] [id number]  Dept: [phone]  Dept Fax: [phone]  Loc: [phone]     Christopher Mallory is a 71 y.o. male who presents today for his medical conditions/complaintsas noted below. Christopher Mallory is c/o of   Chief Complaint   Patient presents with    Hypertension     6 month    Hyperlipidemia    Diabetes         HPI:     Hypertension  This is a chronic problem. The current episode started more than 1 year ago. The problem has been waxing and waning since onset. The problem is controlled. Pertinent negatives include no chest pain, headaches, neck pain, palpitations or shortness of breath. There is no history of chronic renal disease. Hyperlipidemia  This is a chronic problem. The current episode started more than 1 year ago. The problem is controlled. Recent lipid tests were reviewed and are variable. He has no history of chronic renal disease. Pertinent negatives include no chest pain or shortness of breath. Diabetes  He presents for his follow-up diabetic visit. He has type 2 diabetes mellitus. The initial diagnosis of diabetes was made 11 years ago. His disease course has been fluctuating. Pertinent negatives for hypoglycemia include no confusion, dizziness, headaches, nervousness/anxiousness or pallor. Pertinent negatives for diabetes include no chest pain, no polydipsia, no polyuria and no weakness. Hemoglobin A1C (%)   Date Value   08/08/2022 7.0 (H)   05/05/2022 6.8 (H)   01/28/2022 7.0 (H)            Microalb/Crt.  Ratio (mcg/mg creat)   Date Value   08/25/2022 Can not be calculated     LDL Cholesterol (mg/dL)   Date Value   08/25/2022 48   07/26/2021 36   07/23/2020 52         AST (U/L)   Date Value   08/25/2022 12     ALT (U/L)   Date Value   08/25/2022 14     BUN (mg/dL)   Date Value   08/25/2022 20     BP Readings from Last 3 Encounters:   09/01/22 124/70   09/01/22 124/70   22 128/68              Past Medical History:   Diagnosis Date    Anemia     minimal anemia, hemoglobin 13.6    Benign prostatic hypertrophy     Cyst in hand     distal phalanx, mid finger left hand    Hyperlipidemia     Hypertension     Keratosis     right forehead    Overweight(278.02)     Seborrhea     Type II or unspecified type diabetes mellitus without mention of complication, not stated as uncontrolled       Past Surgical History:   Procedure Laterality Date    COLONOSCOPY N/A 2020    diffuse severe diverticulosis, TA, hyperplastic polyp.  at [facility]       Family History   Problem Relation Age of Onset    Diabetes Mother     Cancer Father         throat cancer    Stroke Father     Cancer Brother 64        pancreatic    Cancer Paternal Uncle         lung          Social History     Tobacco Use    Smoking status: Former     Packs/day: 3.00     Years: 20.00     Pack years: 60.00     Types: Cigarettes     Quit date: 2000     Years since quittin.7    Smokeless tobacco: Never   Substance Use Topics    Alcohol use: Yes     Comment: no alcohol ingestion after a heavier intake eearlier in his life         Current Outpatient Medications   Medication Sig Dispense Refill    cephALEXin (KEFLEX) 500 MG capsule Take 1 capsule by mouth 3 times daily for 10 days 30 capsule 0    rosuvastatin (CRESTOR) 10 MG tablet TAKE 1 TABLET BY MOUTH EVERY DAY 90 tablet 3    blood glucose test strips (ACCU-CHEK BYRON PLUS) strip TEST TWO TIMES DAILY FOR IRREGULAR BLOOD GLUCOSE 200 strip 3    metFORMIN (GLUCOPHAGE-XR) 500 MG extended release tablet TAKE 2 TABLETS BY MOUTH TWO TIMES A  tablet 3    lisinopril (PRINIVIL;ZESTRIL) 10 MG tablet TAKE 1 TABLET BY MOUTH EVERY DAY 90 tablet 3    aspirin 81 MG EC tablet Take 81 mg by mouth daily      Blood Glucose Monitoring Suppl (ONE TOUCH ULTRA 2) by Does not apply route.  Tests blood sugar once a day       No current facility-administered medications for this visit. No Known Allergies    Health Maintenance   Topic Date Due    Pneumococcal 65+ years Vaccine (1 - PCV) Never done    Hepatitis C screen  Never done    Shingles vaccine (1 of 2) Never done    COVID-19 Vaccine (4 - Booster for Pfizer series) 02/08/2022    Flu vaccine (1) 09/01/2022    Depression Screen  02/25/2023    Annual Wellness Visit (AWV)  02/26/2023    A1C test (Diabetic or Prediabetic)  08/08/2023    Diabetic foot exam  08/25/2023    Diabetic microalbuminuria test  08/25/2023    Lipids  08/25/2023    Diabetic retinal exam  04/01/2024    Colorectal Cancer Screen  09/14/2025    DTaP/Tdap/Td vaccine (2 - Td or Tdap) 02/10/2031    AAA screen  Completed    Hepatitis A vaccine  Aged Out    Hib vaccine  Aged Out    Meningococcal (ACWY) vaccine  Aged Out       Subjective:      Review of Systems   Constitutional:  Negative for chills and fever. HENT:  Negative for hearing loss. Eyes:  Negative for pain and visual disturbance. Respiratory:  Negative for cough and shortness of breath. Cardiovascular:  Negative for chest pain, palpitations and leg swelling. Gastrointestinal:  Negative for abdominal pain, blood in stool, constipation, diarrhea, nausea and vomiting. Endocrine: Negative for cold intolerance, polydipsia and polyuria. Genitourinary:  Negative for difficulty urinating, dysuria and hematuria. Musculoskeletal:  Negative for arthralgias, back pain, gait problem and neck pain. Skin:  Negative for pallor and rash. Neurological:  Negative for dizziness, weakness, numbness and headaches. Hematological:  Negative for adenopathy. Does not bruise/bleed easily. Psychiatric/Behavioral:  Negative for confusion. The patient is not nervous/anxious. Objective:     Physical Exam  Vitals reviewed. Constitutional:       Appearance: He is well-developed. HENT:      Head: Normocephalic and atraumatic. Eyes:      Pupils: Pupils are equal, round, and reactive to light.    Cardiovascular: Rate and Rhythm: Normal rate and regular rhythm. Heart sounds: No murmur heard. No friction rub. No gallop. Pulmonary:      Effort: Pulmonary effort is normal.      Breath sounds: Normal breath sounds. No wheezing or rales. Abdominal:      General: There is no distension. Palpations: Abdomen is soft. There is no mass. Tenderness: There is no abdominal tenderness. There is no rebound. Musculoskeletal:         General: Normal range of motion. Cervical back: Neck supple. Lymphadenopathy:      Cervical: No cervical adenopathy. Skin:     General: Skin is warm and dry. Findings: No rash. Neurological:      Mental Status: He is alert and oriented to person, place, and time. Cranial Nerves: No cranial nerve deficit (grossly). Psychiatric:         Thought Content: Thought content normal.      /70 (Site: Left Upper Arm, Position: Sitting, Cuff Size: Large Adult)   Pulse 76   Resp 16   Ht 6' (1.829 m)   Wt 211 lb (95.7 kg)   BMI 28.62 kg/m²     Assessment:       Diagnosis Orders   1. Primary hypertension        2. Other hyperlipidemia        3. Type 2 diabetes mellitus without complication, without long-term current use of insulin ([facility] [address]        4. Iron deficiency anemia, unspecified iron deficiency anemia type  Hemoglobin    Iron and TIBC      Test results for this appointment. 8/25/2022 normal hemoglobin 13.0    8/25/2022 normal total cholesterol 95.    8/25/2022 normal PSA 1.06    8/8/2022 okay A1c 7.0. Patient told to continue metformin. Plan:       Return in about 6 months (around 3/3/2023) for medicare AWV, Hypertension, Hyperlipidemia, Diabetes. Orders Placed This Encounter   Procedures    Hemoglobin     Standing Status:   Future     Standing Expiration Date:   9/1/2023    Iron and TIBC     Standing Status:   Future     Standing Expiration Date:   9/1/2023     Order Specific Question:   Is Patient Fasting?      Answer:   na     Order Specific Question:   No of Hours? Answer:   na       No orders of the defined types were placed in this encounter. Patientgiven educational materials - see patient instructions. Discussed use, benefit,and side effects of prescribed medications. All patient questions answered. Ptvoiced understanding. Reviewed health maintenance. Instructed to continue currentmedications, diet and exercise. Patient agreed with treatment plan. Follow up asdirected.      Electronically signed by Zion Rose MD on 9/1/2022 at 8:46 AM

## 2022-09-01 NOTE — PROGRESS NOTES
Subjective:    Frannie Yeh is a 71 y.o. male who presents with ulcer L toe. .  no issues with the Rx. Pt has been compliant with the offloading. Patient relates pain is Absent . Pain is rated 0 out of 10 and is described as none. Currently denies F/C/N/V. Overall diabetic control is worsened. No Known Allergies    Past Medical History:   Diagnosis Date    Anemia     minimal anemia, hemoglobin 13.6    Benign prostatic hypertrophy     Cyst in hand     distal phalanx, mid finger left hand    Hyperlipidemia     Hypertension     Keratosis     right forehead    Overweight(278.02)     Seborrhea     Type II or unspecified type diabetes mellitus without mention of complication, not stated as uncontrolled        Prior to Admission medications    Medication Sig Start Date End Date Taking? Authorizing Provider   cephALEXin (KEFLEX) 500 MG capsule Take 1 capsule by mouth 3 times daily for 10 days 22 Yes Felipe Mena DPM   rosuvastatin (CRESTOR) 10 MG tablet TAKE 1 TABLET BY MOUTH EVERY DAY 8/15/22  Yes Ck Longo MD   blood glucose test strips (ACCU-CHEK BYRON PLUS) strip TEST TWO TIMES DAILY FOR IRREGULAR BLOOD GLUCOSE 22  Yes AXEL Huffman - CNP   metFORMIN (GLUCOPHAGE-XR) 500 MG extended release tablet TAKE 2 TABLETS BY MOUTH TWO TIMES A DAY 22  Yes Ck Longo MD   lisinopril (PRINIVIL;ZESTRIL) 10 MG tablet TAKE 1 TABLET BY MOUTH EVERY DAY 22  Yes Ck Longo MD   aspirin 81 MG EC tablet Take 81 mg by mouth daily   Yes Historical Provider, MD   Blood Glucose Monitoring Suppl (ONE TOUCH ULTRA 2) by Does not apply route.  Tests blood sugar once a day   Yes Historical Provider, MD       Social History     Tobacco Use    Smoking status: Former     Packs/day: 3.00     Years: 20.00     Pack years: 60.00     Types: Cigarettes     Quit date: 2000     Years since quittin.7    Smokeless tobacco: Never   Substance Use Topics    Alcohol use: Yes     Comment: no alcohol ingestion after a heavier intake eearlier in his life       ROS: All 14 ROS systems reviewed and pertinent positives noted above, all others negative. Lower Extremity Physical Examination:     Vitals:   Vitals:    09/01/22 0743   BP: 124/70   Pulse: 76   Resp: 20   Temp: 97.9 °F (36.6 °C)     General: AAO x 3 in NAD. Vascular: DP and PT pulses palpable 2/4, bilateral.  CFT <3 seconds, bilateral.  Hair growth absent to the level of the digits, bilateral.  Edema present, bilateral.  Varicosities present, bilateral. Erythema present L 4th toe. Distal Rubor absent bilateral.  Temperature decreased bilateral. Hyperpigmentation present bilateral. Atrophic skin no  Neurological: Sensation Impaired to light touch to level of digits, bilateral.  Protective sensation intact  0/10 sites via 5.07/10g [place]-Katia Monofilament, bilateral.  negative Tinel's, bilateral.  negative Valleix sign, bilateral.  Vibratory abnormal  bilateral.  Reflexes Decreased bilateral.  Paresthesias positive. Dysthesias negative. Sharp/dull abnormal  bilateral.      Musculoskeletal: Muscle strength 5/5, bilateral.  Pain absent upon palpation bilateral. Normal medial longitudinal arch, bilateral.  Ankle ROM within normal limits,bilateral.  1st MPJ ROM within normal limits, bilateral.  Dorsally contracted digits present. No other foot deformities. ,Derm: ulcer dorsolateral L 4th toe with no callus and positive fibrin  no probing no undermining no malodor. ELVIE ulcer edema and erythema resolved,  Interdigital maceration absent to web spaces , Bilateral.  Nails b/lthickened, dystrophic and crumbly, discolored with subungual debris.   Fissures absent, Bilateral.   Wound 08/25/22 # left 4th toe (Active)   Wound Cleansed Irrigated with saline 09/01/22 0809   Dressing/Treatment Collagen 09/01/22 0809   Offloading for Diabetic Foot Ulcers Post op shoe 08/25/22 0846   Post-Procedure Length (cm) 0.3 cm 09/01/22 0809   Post-Procedure Width (cm) 0.2 cm 09/01/22 0809   Post-Procedure Depth (cm) 0.1 cm 09/01/22 0809   Post-Procedure Surface Area (cm^2) 0.06 cm^2 09/01/22 0809   Post-Procedure Volume (cm^3) 0.006 cm^3 09/01/22 0809   Number of days: 6       Asessment: Patient is a 71 y.o. male with:    Diagnosis Orders   1. Skin ulcer of toe of left foot, limited to breakdown of skin ([facility] [address]        2. DM type 2, uncontrolled, with neuropathy ([facility] [address]              Ulcer Classification:  Diabetic ulcer grade 1 B with 1A look. IDSA  no infection. Plan:   Patient examined and evaluated. Diabetic foot education and exam.  Current Active wound management took place 100% non excisional with the use of  tissue nippers. All non viable tissue (including epidermis and/or dermis) and bio burden was removed to promote healing. Bleeding was present. Please see chart for exact measurements, if not documented then size was less than 20 sq cm. Patient advised importance of overall diabetic control and will continue offloading as advised and stressed the importance of this in regards to wound healing. Today's dressing:Other: collagen. Continue offloading sx shoe  Contact clinic with any questions/problems/concerns or new signs of infection. Follow-up at [facility] in 1week(s).

## 2022-09-08 ENCOUNTER — OFFICE VISIT (OUTPATIENT)
Dept: PODIATRY | Age: 72
End: 2022-09-08
Payer: MEDICARE

## 2022-09-08 VITALS
HEART RATE: 76 BPM | BODY MASS INDEX: 28.73 KG/M2 | RESPIRATION RATE: 20 BRPM | SYSTOLIC BLOOD PRESSURE: 134 MMHG | WEIGHT: 211.8 LBS | TEMPERATURE: 97.1 F | DIASTOLIC BLOOD PRESSURE: 70 MMHG

## 2022-09-08 DIAGNOSIS — L97.521 SKIN ULCER OF TOE OF LEFT FOOT, LIMITED TO BREAKDOWN OF SKIN (HCC): Primary | ICD-10-CM

## 2022-09-08 PROCEDURE — 97597 DBRDMT OPN WND 1ST 20 CM/<: CPT | Performed by: PODIATRIST

## 2022-09-08 PROCEDURE — 99212 OFFICE O/P EST SF 10 MIN: CPT | Performed by: PODIATRIST

## 2022-09-08 PROCEDURE — 99999 PR OFFICE/OUTPT VISIT,PROCEDURE ONLY: CPT | Performed by: PODIATRIST

## 2022-09-08 NOTE — PROGRESS NOTES
Subjective:    Olvin Man is a 71 y.o. male who presents with ulcer L toe. Pt has been compliant with the offloading. Patient relates pain is Absent . Pain is rated 0 out of 10 and is described as none. Currently denies F/C/N/V. Overall diabetic control is worsened. No Known Allergies    Past Medical History:   Diagnosis Date    Anemia     minimal anemia, hemoglobin 13.6    Benign prostatic hypertrophy     Cyst in hand     distal phalanx, mid finger left hand    Hyperlipidemia     Hypertension     Keratosis     right forehead    Overweight(278.02)     Seborrhea     Type II or unspecified type diabetes mellitus without mention of complication, not stated as uncontrolled        Prior to Admission medications    Medication Sig Start Date End Date Taking? Authorizing Provider   rosuvastatin (CRESTOR) 10 MG tablet TAKE 1 TABLET BY MOUTH EVERY DAY 8/15/22  Yes Kam Vernon MD   blood glucose test strips (ACCU-CHEK BYRON PLUS) strip TEST TWO TIMES DAILY FOR IRREGULAR BLOOD GLUCOSE 22  Yes Keke Olivas APRN - CNP   metFORMIN (GLUCOPHAGE-XR) 500 MG extended release tablet TAKE 2 TABLETS BY MOUTH TWO TIMES A DAY 22  Yes Kam Vernon MD   lisinopril (PRINIVIL;ZESTRIL) 10 MG tablet TAKE 1 TABLET BY MOUTH EVERY DAY 22  Yes Kam Vernon MD   aspirin 81 MG EC tablet Take 81 mg by mouth daily   Yes Historical Provider, MD   Blood Glucose Monitoring Suppl (ONE TOUCH ULTRA 2) by Does not apply route.  Tests blood sugar once a day   Yes Historical Provider, MD       Social History     Tobacco Use    Smoking status: Former     Packs/day: 3.00     Years: 20.00     Pack years: 60.00     Types: Cigarettes     Quit date: 2000     Years since quittin.7    Smokeless tobacco: Never   Substance Use Topics    Alcohol use: Yes     Comment: no alcohol ingestion after a heavier intake florenciaer in his life       ROS: All 14 ROS systems reviewed and pertinent positives noted above, all others negative. Lower Extremity Physical Examination:     Vitals:   Vitals:    09/08/22 0753   BP: 134/70   Pulse: 76   Resp: 20   Temp: 97.1 °F (36.2 °C)     General: AAO x 3 in NAD. Vascular: DP and PT pulses palpable 2/4, bilateral.  CFT <3 seconds, bilateral.  Hair growth absent to the level of the digits, bilateral.  Edema present, bilateral.  Varicosities present, bilateral. Erythema present L 4th toe. Distal Rubor absent bilateral.  Temperature decreased bilateral. Hyperpigmentation present bilateral. Atrophic skin no  Neurological: Sensation Impaired to light touch to level of digits, bilateral.  Protective sensation intact  0/10 sites via 5.07/10g [place]-Katia Monofilament, bilateral.  negative Tinel's, bilateral.  negative Valleix sign, bilateral.  Vibratory abnormal  bilateral.  Reflexes Decreased bilateral.  Paresthesias positive. Dysthesias negative. Sharp/dull abnormal  bilateral.    Musculoskeletal: Muscle strength 5/5, bilateral.  Pain absent upon palpation bilateral. Normal medial longitudinal arch, bilateral.  Ankle ROM within normal limits,bilateral.  1st MPJ ROM within normal limits, bilateral.  Dorsally contracted digits present. No other foot deformities. ,Derm: ulcer dorsolateral L 4th toe with no callus and positive fibrin  no probing no undermining no malodor. ELVIE ulcer edema and erythema resolved,  Interdigital maceration absent to web spaces , Bilateral.  Nails b/lthickened, dystrophic and crumbly, discolored with subungual debris.   Fissures absent, Bilateral.   Wound 08/25/22 # left 4th toe (Active)   Wound Cleansed Irrigated with saline 09/01/22 0809   Dressing/Treatment Antibacterial ointment 09/08/22 0808   Offloading for Diabetic Foot Ulcers Post op shoe 08/25/22 0846   Post-Procedure Length (cm) 0.2 cm 09/08/22 0808   Post-Procedure Width (cm) 0.1 cm 09/08/22 0808   Post-Procedure Depth (cm) 0.1 cm 09/08/22 0808   Post-Procedure Surface Area (cm^2) 0.02 cm^2 09/08/22 6844 Post-Procedure Volume (cm^3) 0.002 cm^3 09/08/22 4196   Number of days: 13           Asessment: Patient is a 71 y.o. male with:    Diagnosis Orders   1. Skin ulcer of toe of left foot, limited to breakdown of skin ([facility] [address]        2. DM type 2, uncontrolled, with neuropathy ([facility] [address]              Ulcer Classification:  Diabetic ulcer grade 1 B with 1A look. IDSA  no infection. Plan:   Patient examined and evaluated. Diabetic foot education and exam.  Current Active wound management took place 100% non excisional with the use of  tissue nippers. All non viable tissue (including epidermis and/or dermis) and bio burden was removed to promote healing. Bleeding was present. Please see chart for exact measurements, if not documented then size was less than 20 sq cm. Patient advised importance of overall diabetic control and will continue offloading as advised and stressed the importance of this in regards to wound healing. Today's dressing:abx ointment qd  Continue offloading sx shoe  Contact clinic with any questions/problems/concerns or new signs of infection. Follow-up at [facility] in 1 week(s).

## 2022-09-08 NOTE — PATIENT INSTRUCTIONS
Antibiotic ointment to left 4th toe and cover with a band aid daily. Return next week to see Dayana Brito as scheduled.

## 2022-09-15 ENCOUNTER — OFFICE VISIT (OUTPATIENT)
Dept: PODIATRY | Age: 72
End: 2022-09-15
Payer: MEDICARE

## 2022-09-15 VITALS
DIASTOLIC BLOOD PRESSURE: 68 MMHG | BODY MASS INDEX: 28.75 KG/M2 | HEART RATE: 72 BPM | RESPIRATION RATE: 20 BRPM | WEIGHT: 212 LBS | SYSTOLIC BLOOD PRESSURE: 126 MMHG

## 2022-09-15 DIAGNOSIS — M20.40 HAMMER TOE, UNSPECIFIED LATERALITY: ICD-10-CM

## 2022-09-15 DIAGNOSIS — L97.521 SKIN ULCER OF TOE OF LEFT FOOT, LIMITED TO BREAKDOWN OF SKIN (HCC): Primary | ICD-10-CM

## 2022-09-15 PROCEDURE — 3017F COLORECTAL CA SCREEN DOC REV: CPT | Performed by: PODIATRIST

## 2022-09-15 PROCEDURE — 1036F TOBACCO NON-USER: CPT | Performed by: PODIATRIST

## 2022-09-15 PROCEDURE — 99212 OFFICE O/P EST SF 10 MIN: CPT | Performed by: PODIATRIST

## 2022-09-15 PROCEDURE — 99213 OFFICE O/P EST LOW 20 MIN: CPT | Performed by: PODIATRIST

## 2022-09-15 PROCEDURE — 3051F HG A1C>EQUAL 7.0%<8.0%: CPT | Performed by: PODIATRIST

## 2022-09-15 PROCEDURE — G8417 CALC BMI ABV UP PARAM F/U: HCPCS | Performed by: PODIATRIST

## 2022-09-15 PROCEDURE — 1123F ACP DISCUSS/DSCN MKR DOCD: CPT | Performed by: PODIATRIST

## 2022-09-15 PROCEDURE — 2022F DILAT RTA XM EVC RTNOPTHY: CPT | Performed by: PODIATRIST

## 2022-09-15 PROCEDURE — G8427 DOCREV CUR MEDS BY ELIG CLIN: HCPCS | Performed by: PODIATRIST

## 2022-09-15 NOTE — PROGRESS NOTES
Subjective:    Emili Mendez is a 71 y.o. male who presents with ulcer L toe. Pt has been compliant with the offloading. Patient relates pain is Absent . Pain is rated 0 out of 10 and is described as none. Currently denies F/C/N/V. Overall diabetic control is worsened. No Known Allergies    Past Medical History:   Diagnosis Date    Anemia     minimal anemia, hemoglobin 13.6    Benign prostatic hypertrophy     Cyst in hand     distal phalanx, mid finger left hand    Hyperlipidemia     Hypertension     Keratosis     right forehead    Overweight(278.02)     Seborrhea     Type II or unspecified type diabetes mellitus without mention of complication, not stated as uncontrolled        Prior to Admission medications    Medication Sig Start Date End Date Taking? Authorizing Provider   rosuvastatin (CRESTOR) 10 MG tablet TAKE 1 TABLET BY MOUTH EVERY DAY 8/15/22  Yes Jeanine Watkins MD   blood glucose test strips (ACCU-CHEK BYRON PLUS) strip TEST TWO TIMES DAILY FOR IRREGULAR BLOOD GLUCOSE 22  Yes Keke Mae APRN - CNP   metFORMIN (GLUCOPHAGE-XR) 500 MG extended release tablet TAKE 2 TABLETS BY MOUTH TWO TIMES A DAY 22  Yes Jeanine Watkins MD   lisinopril (PRINIVIL;ZESTRIL) 10 MG tablet TAKE 1 TABLET BY MOUTH EVERY DAY 22  Yes Jeanine Watkins MD   aspirin 81 MG EC tablet Take 81 mg by mouth daily   Yes Historical Provider, MD   Blood Glucose Monitoring Suppl (ONE TOUCH ULTRA 2) by Does not apply route.  Tests blood sugar once a day   Yes Historical Provider, MD       Social History     Tobacco Use    Smoking status: Former     Packs/day: 3.00     Years: 20.00     Pack years: 60.00     Types: Cigarettes     Quit date: 2000     Years since quittin.7    Smokeless tobacco: Never   Substance Use Topics    Alcohol use: Yes     Comment: no alcohol ingestion after a heavier intake eearlier in his life       ROS: All 14 ROS systems reviewed and pertinent positives noted above, all others negative. Lower Extremity Physical Examination:     Vitals:   Vitals:    09/15/22 0741   BP: 126/68   Pulse: 72   Resp: 20     General: AAO x 3 in NAD. Vascular: DP and PT pulses palpable 2/4, bilateral.  CFT <3 seconds, bilateral.  Hair growth absent to the level of the digits, bilateral.  Edema present, bilateral.  Varicosities present, bilateral. Erythema present L 4th toe. Distal Rubor absent bilateral.  Temperature decreased bilateral. Hyperpigmentation present bilateral. Atrophic skin no    Neurological: Sensation Impaired to light touch to level of digits, bilateral.  Protective sensation intact  0/10 sites via 5.07/10g [place]-Katia Monofilament, bilateral.  negative Tinel's, bilateral.  negative Valleix sign, bilateral.  Vibratory abnormal  bilateral.  Reflexes Decreased bilateral.  Paresthesias positive. Dysthesias negative. Sharp/dull abnormal  bilateral.    Musculoskeletal: Muscle strength 5/5, bilateral.  Pain absent upon palpation bilateral. Normal medial longitudinal arch, bilateral.  Ankle ROM within normal limits,bilateral.  1st MPJ ROM within normal limits, bilateral.  Dorsally contracted digits present. No other foot deformities. Derm: ulcer dorsolateral L 4th toe resolved,  Interdigital maceration absent to web spaces , Bilateral.  Nails b/lthickened, dystrophic and crumbly, discolored with subungual debris. Fissures absent, Bilateral.       Asessment: Patient is a 71 y.o. male with:    Diagnosis Orders   1. Skin ulcer of toe of left foot, limited to breakdown of skin ([address]        2. DM type 2, uncontrolled, with neuropathy ([address]        3. Hammer toe, unspecified laterality              Ulcer Classification:  Diabetic ulcer grade 1 B with 1A look resolved. IDSA  no infection. Plan:   Patient examined and evaluated. Diabetic foot education and exam.  Current No debridement needed of the ulcer today. The ulcer is completley resolved at this time.  Pt was educated about means of prevention and risk factor modification. Pt should return to the clinic PRN if any further ulceration should occur. Pt given option of DM shoes, pt wishes to hold off for now. Patient is low level medical decision making. Patient is stable chronic condition. No new testing. Low risk morbidity. Contact clinic with any questions/problems/concerns or new signs of infection.  [facility]

## 2022-10-20 ENCOUNTER — OFFICE VISIT (OUTPATIENT)
Dept: PODIATRY | Age: 72
End: 2022-10-20
Payer: MEDICARE

## 2022-10-20 VITALS
DIASTOLIC BLOOD PRESSURE: 60 MMHG | TEMPERATURE: 96.6 F | HEART RATE: 68 BPM | WEIGHT: 217.8 LBS | BODY MASS INDEX: 29.54 KG/M2 | SYSTOLIC BLOOD PRESSURE: 124 MMHG | RESPIRATION RATE: 20 BRPM

## 2022-10-20 DIAGNOSIS — L03.032 CELLULITIS OF TOE OF LEFT FOOT: ICD-10-CM

## 2022-10-20 DIAGNOSIS — E11.42 DM TYPE 2 WITH DIABETIC PERIPHERAL NEUROPATHY (HCC): ICD-10-CM

## 2022-10-20 DIAGNOSIS — L97.521 SKIN ULCER OF TOE OF LEFT FOOT, LIMITED TO BREAKDOWN OF SKIN (HCC): Primary | ICD-10-CM

## 2022-10-20 PROCEDURE — G8427 DOCREV CUR MEDS BY ELIG CLIN: HCPCS | Performed by: PODIATRIST

## 2022-10-20 PROCEDURE — 1036F TOBACCO NON-USER: CPT | Performed by: PODIATRIST

## 2022-10-20 PROCEDURE — 3017F COLORECTAL CA SCREEN DOC REV: CPT | Performed by: PODIATRIST

## 2022-10-20 PROCEDURE — G8484 FLU IMMUNIZE NO ADMIN: HCPCS | Performed by: PODIATRIST

## 2022-10-20 PROCEDURE — 99213 OFFICE O/P EST LOW 20 MIN: CPT | Performed by: PODIATRIST

## 2022-10-20 PROCEDURE — 2022F DILAT RTA XM EVC RTNOPTHY: CPT | Performed by: PODIATRIST

## 2022-10-20 PROCEDURE — G8417 CALC BMI ABV UP PARAM F/U: HCPCS | Performed by: PODIATRIST

## 2022-10-20 PROCEDURE — 99212 OFFICE O/P EST SF 10 MIN: CPT | Performed by: PODIATRIST

## 2022-10-20 PROCEDURE — 97597 DBRDMT OPN WND 1ST 20 CM/<: CPT | Performed by: PODIATRIST

## 2022-10-20 PROCEDURE — 1123F ACP DISCUSS/DSCN MKR DOCD: CPT | Performed by: PODIATRIST

## 2022-10-20 PROCEDURE — 3051F HG A1C>EQUAL 7.0%<8.0%: CPT | Performed by: PODIATRIST

## 2022-10-20 RX ORDER — CEPHALEXIN 500 MG/1
500 CAPSULE ORAL 3 TIMES DAILY
Qty: 30 CAPSULE | Refills: 0 | Status: SHIPPED | OUTPATIENT
Start: 2022-10-20 | End: 2022-10-30

## 2022-10-20 NOTE — PROGRESS NOTES
Subjective:    Jose Payan is a 71 y.o. male who presents with new ulcer L toe. No tx tried. No draiange seen at [facility]  Patient relates pain is Absent . Pain is rated 0 out of 10 and is described as none. Currently denies F/C/N/V. Overall diabetic control is worsened. No Known Allergies    Past Medical History:   Diagnosis Date    Anemia     minimal anemia, hemoglobin 13.6    Benign prostatic hypertrophy     Cyst in hand     distal phalanx, mid finger left hand    Hyperlipidemia     Hypertension     Keratosis     right forehead    Overweight(278.02)     Seborrhea     Type II or unspecified type diabetes mellitus without mention of complication, not stated as uncontrolled        Prior to Admission medications    Medication Sig Start Date End Date Taking? Authorizing Provider   rosuvastatin (CRESTOR) 10 MG tablet TAKE 1 TABLET BY MOUTH EVERY DAY 8/15/22  Yes Gutierrez Velasco MD   blood glucose test strips (ACCU-CHEK BYRON PLUS) strip TEST TWO TIMES DAILY FOR IRREGULAR BLOOD GLUCOSE 22  Yes Keke Crump APRN - CNP   metFORMIN (GLUCOPHAGE-XR) 500 MG extended release tablet TAKE 2 TABLETS BY MOUTH TWO TIMES A DAY 22  Yes Gutierrez Velasco MD   lisinopril (PRINIVIL;ZESTRIL) 10 MG tablet TAKE 1 TABLET BY MOUTH EVERY DAY 22  Yes Gutierrez Velasco MD   aspirin 81 MG EC tablet Take 81 mg by mouth daily   Yes Historical Provider, MD   Blood Glucose Monitoring Suppl (ONE TOUCH ULTRA 2) by Does not apply route.  Tests blood sugar once a day   Yes Historical Provider, MD       Social History     Tobacco Use    Smoking status: Former     Packs/day: 3.00     Years: 20.00     Pack years: 60.00     Types: Cigarettes     Quit date: 2000     Years since quittin.8    Smokeless tobacco: Never   Substance Use Topics    Alcohol use: Yes     Comment: no alcohol ingestion after a heavier intake lizzy in his life       ROS: All 14 ROS systems reviewed and pertinent positives noted above, all others negative. Lower Extremity Physical Examination:     Vitals:   Vitals:    10/20/22 1531   BP: 124/60   Pulse: 68   Resp: 20   Temp: (!) 96.6 °F (35.9 °C)     General: AAO x 3 in NAD. Vascular: DP and PT pulses palpable 2/4, bilateral.  CFT <3 seconds, bilateral.  Hair growth absent to the level of the digits, bilateral.  Edema present, bilateral.  Varicosities present, bilateral. Erythema present L 4th toe. Distal Rubor absent bilateral.  Temperature decreased bilateral. Hyperpigmentation present bilateral. Atrophic skin no    Neurological: Sensation Impaired to light touch to level of digits, bilateral.  Protective sensation intact  0/10 sites via 5.07/10g [place]ia Monofilament, bilateral.  negative Tinel's, bilateral.  negative Valleix sign, bilateral.  Vibratory abnormal  bilateral.  Reflexes Decreased bilateral.  Paresthesias positive. Dysthesias negative. Sharp/dull abnormal  bilateral.    Musculoskeletal: Muscle strength 5/5, bilateral.  Pain absent upon palpation bilateral. Normal medial longitudinal arch, bilateral.  Ankle ROM within normal limits,bilateral.  1st MPJ ROM within normal limits, bilateral.  Dorsally contracted digits present. No other foot deformities. Derm: ulcer dorsolateral L 4th toe 0.5x0.4x0.2cm, positive fibrin and callous rim, healthy red granular base, no probing no undermining no malodor. Proximal side edema and erythema are seen. Interdigital maceration absent to web spaces , Bilateral.  Nails b/lthickened, dystrophic and crumbly, discolored with subungual debris. Fissures absent, Bilateral.       Asessment: Patient is a 71 y.o. male with:    Diagnosis Orders   1. Skin ulcer of toe of left foot, limited to breakdown of skin ([address]        2. Cellulitis of toe of left foot        3. DM type 2 with diabetic peripheral neuropathy (HCC)              Ulcer Classification:  Diabetic ulcer grade 1 B  IDSA  mild infection. Plan:   Patient examined and evaluated. Diabetic foot education and exam.  Current Active wound management took place 100% non excisional with the use of  tissue nippers. All non viable tissue (including epidermis and/or dermis) and bio burden was removed to promote healing. Bleeding was present. Please see chart for exact measurements, if not documented then size was less than 20 sq cm. Orders Placed This Encounter   Medications    cephALEXin (KEFLEX) 500 MG capsule     Sig: Take 1 capsule by mouth 3 times daily for 10 days     Dispense:  30 capsule     Refill:  0     Patient encouraged to use external steel toe covers. Patient does not have to wear those steel toes for work but is preferred it. Patient should consider diabetic shoe. Patient is low level medical decision making. Patient is had uncomplicated condition. 1 new prescription. Low risk morbidity the current treatment course. Contact clinic with any questions/problems/concerns or new signs of infection.  Follow-up at [facility] in 1 week

## 2022-10-20 NOTE — PATIENT INSTRUCTIONS
Collagen to left 4th toe then cover with a dry dressing. Change dressing every day. [facility]

## 2022-10-27 ENCOUNTER — OFFICE VISIT (OUTPATIENT)
Dept: PODIATRY | Age: 72
End: 2022-10-27
Payer: MEDICARE

## 2022-10-27 VITALS
DIASTOLIC BLOOD PRESSURE: 80 MMHG | TEMPERATURE: 96.8 F | WEIGHT: 217.8 LBS | SYSTOLIC BLOOD PRESSURE: 132 MMHG | BODY MASS INDEX: 29.54 KG/M2 | RESPIRATION RATE: 20 BRPM | HEART RATE: 76 BPM

## 2022-10-27 DIAGNOSIS — M20.40 HAMMER TOE, UNSPECIFIED LATERALITY: ICD-10-CM

## 2022-10-27 DIAGNOSIS — L97.521 SKIN ULCER OF TOE OF LEFT FOOT, LIMITED TO BREAKDOWN OF SKIN (HCC): Primary | ICD-10-CM

## 2022-10-27 DIAGNOSIS — E11.42 DM TYPE 2 WITH DIABETIC PERIPHERAL NEUROPATHY (HCC): ICD-10-CM

## 2022-10-27 PROCEDURE — 99212 OFFICE O/P EST SF 10 MIN: CPT | Performed by: PODIATRIST

## 2022-10-27 PROCEDURE — 97597 DBRDMT OPN WND 1ST 20 CM/<: CPT | Performed by: PODIATRIST

## 2022-10-27 PROCEDURE — 99999 PR OFFICE/OUTPT VISIT,PROCEDURE ONLY: CPT | Performed by: PODIATRIST

## 2022-10-27 NOTE — PROGRESS NOTES
Subjective:    Mayr Menjivar is a 71 y.o. male who presents with ulcer L toe. No tx tried. Pt thinks his toe has looked better at home. Patient relates pain is Absent . Pain is rated 0 out of 10 and is described as none. Currently denies F/C/N/V. Overall diabetic control is worsened. No Known Allergies    Past Medical History:   Diagnosis Date    Anemia     minimal anemia, hemoglobin 13.6    Benign prostatic hypertrophy     Cyst in hand     distal phalanx, mid finger left hand    Hyperlipidemia     Hypertension     Keratosis     right forehead    Overweight(278.02)     Seborrhea     Type II or unspecified type diabetes mellitus without mention of complication, not stated as uncontrolled        Prior to Admission medications    Medication Sig Start Date End Date Taking? Authorizing Provider   cephALEXin (KEFLEX) 500 MG capsule Take 1 capsule by mouth 3 times daily for 10 days 10/20/22 10/30/22 Yes Merline Doyne Rohdy, DPM   rosuvastatin (CRESTOR) 10 MG tablet TAKE 1 TABLET BY MOUTH EVERY DAY 8/15/22  Yes Rosemarie Hansen MD   blood glucose test strips (ACCU-CHEK BYRON PLUS) strip TEST TWO TIMES DAILY FOR IRREGULAR BLOOD GLUCOSE 22  Yes Keke Neumann, APRN - CNP   metFORMIN (GLUCOPHAGE-XR) 500 MG extended release tablet TAKE 2 TABLETS BY MOUTH TWO TIMES A DAY 22  Yes Rosemarie Hansen MD   lisinopril (PRINIVIL;ZESTRIL) 10 MG tablet TAKE 1 TABLET BY MOUTH EVERY DAY 22  Yes Rosemarie Hansen MD   aspirin 81 MG EC tablet Take 81 mg by mouth daily   Yes Historical Provider, MD   Blood Glucose Monitoring Suppl (ONE TOUCH ULTRA 2) by Does not apply route.  Tests blood sugar once a day   Yes Historical Provider, MD       Social History     Tobacco Use    Smoking status: Former     Packs/day: 3.00     Years: 20.00     Pack years: 60.00     Types: Cigarettes     Quit date: 2000     Years since quittin.8    Smokeless tobacco: Never   Substance Use Topics    Alcohol use: Yes     Comment: no alcohol ingestion after a heavier intake eearlier in his life       ROS: All 14 ROS systems reviewed and pertinent positives noted above, all others negative. Lower Extremity Physical Examination:     Vitals:   Vitals:    10/27/22 0747   BP: 132/80   Pulse: 76   Resp: 20   Temp: 96.8 °F (36 °C)     General: AAO x 3 in NAD. Vascular: DP and PT pulses palpable 2/4, bilateral.  CFT <3 seconds, bilateral.  Hair growth absent to the level of the digits, bilateral.  Edema present, bilateral.  Varicosities present, bilateral. Erythema present L 4th toe. Distal Rubor absent bilateral.  Temperature decreased bilateral. Hyperpigmentation present bilateral. Atrophic skin no    Neurological: Sensation Impaired to light touch to level of digits, bilateral.  Protective sensation intact  0/10 sites via 5.07/10g [place]-Katia Monofilament, bilateral.  negative Tinel's, bilateral.  negative Valleix sign, bilateral.  Vibratory abnormal  bilateral.  Reflexes Decreased bilateral.  Paresthesias positive. Dysthesias negative. Sharp/dull abnormal  bilateral.    Musculoskeletal: Muscle strength 5/5, bilateral.  Pain absent upon palpation bilateral. Normal medial longitudinal arch, bilateral.  Ankle ROM within normal limits,bilateral.  1st MPJ ROM within normal limits, bilateral.  Dorsally contracted digits present. No other foot deformities. Derm: ulcer dorsolateral L 4th toe 0.2x0.2x0.2cm, positive fibrin and minimal callous rim, healthy red granular base, no probing no undermining no malodor. Proximal side edema and erythema are seen. Interdigital maceration absent to web spaces , Bilateral.  Nails b/lthickened, dystrophic and crumbly, discolored with subungual debris. Fissures absent, Bilateral.       Asessment: Patient is a 71 y.o. male with:    Diagnosis Orders   1. Skin ulcer of toe of left foot, limited to breakdown of skin ([address]        2.  DM type 2 with diabetic peripheral neuropathy (HCC)        3. Hammer toe, unspecified laterality              Ulcer Classification:  Diabetic ulcer grade 1 B with 1C look  IDSA  no infection. Plan:   Patient examined and evaluated. Diabetic foot education and exam.  Current Active wound management took place 100% non excisional with the use of  tissue nippers. All non viable tissue (including epidermis and/or dermis) and bio burden was removed to promote healing. Bleeding was present. Please see chart for exact measurements, if not documented then size was less than 20 sq cm. Dressing: silvadne qd    Contact clinic with any questions/problems/concerns or new signs of infection.  Follow-up at [facility] in 1 week

## 2022-11-14 ENCOUNTER — HOSPITAL ENCOUNTER (OUTPATIENT)
Dept: LAB | Age: 72
Discharge: HOME | End: 2022-11-14
Payer: MEDICARE

## 2022-11-14 DIAGNOSIS — E11.9 TYPE 2 DIABETES MELLITUS WITHOUT COMPLICATION, WITHOUT LONG-TERM CURRENT USE OF INSULIN (HCC): ICD-10-CM

## 2022-11-14 PROCEDURE — 83036 HEMOGLOBIN GLYCOSYLATED A1C: CPT

## 2022-11-14 PROCEDURE — 36415 COLL VENOUS BLD VENIPUNCTURE: CPT

## 2022-11-15 LAB
ESTIMATED AVERAGE GLUCOSE: 151 MG/DL
HBA1C MFR BLD: 6.9 % (ref 4–6)

## 2022-11-17 ENCOUNTER — OFFICE VISIT (OUTPATIENT)
Dept: PODIATRY | Age: 72
End: 2022-11-17
Payer: MEDICARE

## 2022-11-17 ENCOUNTER — OFFICE VISIT (OUTPATIENT)
Dept: DIABETES SERVICES | Age: 72
End: 2022-11-17
Payer: MEDICARE

## 2022-11-17 VITALS
DIASTOLIC BLOOD PRESSURE: 72 MMHG | HEART RATE: 60 BPM | SYSTOLIC BLOOD PRESSURE: 132 MMHG | HEIGHT: 72 IN | WEIGHT: 212 LBS | BODY MASS INDEX: 28.71 KG/M2

## 2022-11-17 VITALS
RESPIRATION RATE: 14 BRPM | BODY MASS INDEX: 28.71 KG/M2 | HEART RATE: 60 BPM | HEIGHT: 72 IN | WEIGHT: 212 LBS | DIASTOLIC BLOOD PRESSURE: 72 MMHG | SYSTOLIC BLOOD PRESSURE: 132 MMHG

## 2022-11-17 DIAGNOSIS — E11.42 DM TYPE 2 WITH DIABETIC PERIPHERAL NEUROPATHY (HCC): ICD-10-CM

## 2022-11-17 DIAGNOSIS — L97.521 SKIN ULCER OF LEFT GREAT TOE, LIMITED TO BREAKDOWN OF SKIN (HCC): Primary | ICD-10-CM

## 2022-11-17 DIAGNOSIS — E11.9 TYPE 2 DIABETES MELLITUS WITHOUT COMPLICATION, WITHOUT LONG-TERM CURRENT USE OF INSULIN (HCC): Primary | ICD-10-CM

## 2022-11-17 DIAGNOSIS — L84 CORNS AND CALLOSITIES: ICD-10-CM

## 2022-11-17 DIAGNOSIS — E78.49 OTHER HYPERLIPIDEMIA: ICD-10-CM

## 2022-11-17 DIAGNOSIS — I10 ESSENTIAL HYPERTENSION: ICD-10-CM

## 2022-11-17 PROCEDURE — 3017F COLORECTAL CA SCREEN DOC REV: CPT | Performed by: NURSE PRACTITIONER

## 2022-11-17 PROCEDURE — 3078F DIAST BP <80 MM HG: CPT | Performed by: NURSE PRACTITIONER

## 2022-11-17 PROCEDURE — G8417 CALC BMI ABV UP PARAM F/U: HCPCS | Performed by: NURSE PRACTITIONER

## 2022-11-17 PROCEDURE — G8427 DOCREV CUR MEDS BY ELIG CLIN: HCPCS | Performed by: PODIATRIST

## 2022-11-17 PROCEDURE — 2022F DILAT RTA XM EVC RTNOPTHY: CPT | Performed by: NURSE PRACTITIONER

## 2022-11-17 PROCEDURE — G8417 CALC BMI ABV UP PARAM F/U: HCPCS | Performed by: PODIATRIST

## 2022-11-17 PROCEDURE — 99214 OFFICE O/P EST MOD 30 MIN: CPT | Performed by: NURSE PRACTITIONER

## 2022-11-17 PROCEDURE — 99213 OFFICE O/P EST LOW 20 MIN: CPT | Performed by: PODIATRIST

## 2022-11-17 PROCEDURE — 3044F HG A1C LEVEL LT 7.0%: CPT | Performed by: NURSE PRACTITIONER

## 2022-11-17 PROCEDURE — 3017F COLORECTAL CA SCREEN DOC REV: CPT | Performed by: PODIATRIST

## 2022-11-17 PROCEDURE — 3078F DIAST BP <80 MM HG: CPT | Performed by: PODIATRIST

## 2022-11-17 PROCEDURE — 1123F ACP DISCUSS/DSCN MKR DOCD: CPT | Performed by: PODIATRIST

## 2022-11-17 PROCEDURE — 1036F TOBACCO NON-USER: CPT | Performed by: PODIATRIST

## 2022-11-17 PROCEDURE — G8484 FLU IMMUNIZE NO ADMIN: HCPCS | Performed by: PODIATRIST

## 2022-11-17 PROCEDURE — 3074F SYST BP LT 130 MM HG: CPT | Performed by: PODIATRIST

## 2022-11-17 PROCEDURE — 11055 PARING/CUTG B9 HYPRKER LES 1: CPT | Performed by: PODIATRIST

## 2022-11-17 PROCEDURE — 1036F TOBACCO NON-USER: CPT | Performed by: NURSE PRACTITIONER

## 2022-11-17 PROCEDURE — 1123F ACP DISCUSS/DSCN MKR DOCD: CPT | Performed by: NURSE PRACTITIONER

## 2022-11-17 PROCEDURE — 97597 DBRDMT OPN WND 1ST 20 CM/<: CPT | Performed by: PODIATRIST

## 2022-11-17 PROCEDURE — 3074F SYST BP LT 130 MM HG: CPT | Performed by: NURSE PRACTITIONER

## 2022-11-17 PROCEDURE — 99213 OFFICE O/P EST LOW 20 MIN: CPT | Performed by: NURSE PRACTITIONER

## 2022-11-17 PROCEDURE — 3044F HG A1C LEVEL LT 7.0%: CPT | Performed by: PODIATRIST

## 2022-11-17 PROCEDURE — G8484 FLU IMMUNIZE NO ADMIN: HCPCS | Performed by: NURSE PRACTITIONER

## 2022-11-17 PROCEDURE — 2022F DILAT RTA XM EVC RTNOPTHY: CPT | Performed by: PODIATRIST

## 2022-11-17 PROCEDURE — G8427 DOCREV CUR MEDS BY ELIG CLIN: HCPCS | Performed by: NURSE PRACTITIONER

## 2022-11-17 ASSESSMENT — ENCOUNTER SYMPTOMS
ABDOMINAL PAIN: 0
RESPIRATORY NEGATIVE: 1
DIARRHEA: 0
SHORTNESS OF BREATH: 0

## 2022-11-17 NOTE — PROGRESS NOTES
Foot Care Worksheet  PCP: Akua Bowers MD  Last visit: 9/1/2022    Nail description: Thick , Yellow , Crumbly , Marked limitation of ambulation     Pain resulting from thickened and dystrophy of nail plate No    Nails involved  Right     (T5-T9)  Left       (TA-T4)    Q7 1 Class A Finding - Non traumatic amputation of foot No    Q8 2 Class B Findings - Absent DP pulse No, Absent PT pulse No, Advanced tropic changes (3 required) Yes    Decrease hair growth Yes, Nail changes/thickening Yes, Pigmented changes/discoloration Yes, Skin texture (thin, shiny) No, Skin color (rubor/redness) No    Q9 1 Class B and 2 Class C Findings  Claudication No, Temperature change Yes, Paresthesia Yes, Burning No, Edema Yes    Subjective:    Ashley Groves is a 71 y.o. male who presents with new ulcer L big toe. Was a lot more active in the yard. Patient states he wore old shoe and blue least for multiple yards. Noticed large blisters on both areas. Happened 1 to 2 weeks ago. No tx tried. Mild drainage seen at home. Patient relates pain is Absent . Pain is rated 0 out of 10 and is described as none. Patient also request callus care. Currently denies F/C/N/V. Overall diabetic control is worsened. No Known Allergies    Past Medical History:   Diagnosis Date    Anemia     minimal anemia, hemoglobin 13.6    Benign prostatic hypertrophy     Cyst in hand     distal phalanx, mid finger left hand    Hyperlipidemia     Hypertension     Keratosis     right forehead    Overweight(278.02)     Seborrhea     Type II or unspecified type diabetes mellitus without mention of complication, not stated as uncontrolled        Prior to Admission medications    Medication Sig Start Date End Date Taking?  Authorizing Provider   rosuvastatin (CRESTOR) 10 MG tablet TAKE 1 TABLET BY MOUTH EVERY DAY 8/15/22  Yes Akua Bowers MD   blood glucose test strips (ACCU-CHEK BYRON PLUS) strip TEST TWO TIMES DAILY FOR IRREGULAR BLOOD GLUCOSE 8/11/22 Yes Keke Barrera, APRN - CNP   metFORMIN (GLUCOPHAGE-XR) 500 MG extended release tablet TAKE 2 TABLETS BY MOUTH TWO TIMES A DAY 22  Yes Whitney Maurer MD   lisinopril (PRINIVIL;ZESTRIL) 10 MG tablet TAKE 1 TABLET BY MOUTH EVERY DAY 22  Yes Whitney Maurer MD   aspirin 81 MG EC tablet Take 81 mg by mouth daily   Yes Historical Provider, MD   Blood Glucose Monitoring Suppl (ONE TOUCH ULTRA 2) by Does not apply route. Tests blood sugar once a day   Yes Historical Provider, MD       Social History     Tobacco Use    Smoking status: Former     Packs/day: 3.00     Years: 20.00     Pack years: 60.00     Types: Cigarettes     Quit date: 2000     Years since quittin.9    Smokeless tobacco: Never   Substance Use Topics    Alcohol use: Yes     Comment: no alcohol ingestion after a heavier intake eearlier in his life       ROS: All 14 ROS systems reviewed and pertinent positives noted above, all others negative. Lower Extremity Physical Examination:     Vitals:   Vitals:    22 0821   BP: 132/72   Pulse: 60     General: AAO x 3 in NAD. Vascular: DP and PT pulses palpable 2/4, bilateral.  CFT <3 seconds, bilateral.  Hair growth absent to the level of the digits, bilateral.  Edema present, bilateral.  Varicosities present, bilateral. Erythema present L 4th toe. Distal Rubor absent bilateral.  Temperature decreased bilateral. Hyperpigmentation present bilateral. Atrophic skin no    Neurological: Sensation Impaired to light touch to level of digits, bilateral.  Protective sensation intact  0/10 sites via 5.07/10g [place]-Katia Monofilament, bilateral.  negative Tinel's, bilateral.  negative Valleix sign, bilateral.  Vibratory abnormal  bilateral.  Reflexes Decreased bilateral.  Paresthesias positive. Dysthesias negative.   Sharp/dull abnormal  bilateral.    Musculoskeletal: Muscle strength 5/5, bilateral.  Pain absent upon palpation bilateral. Normal medial longitudinal arch, bilateral. Ankle ROM within normal limits,bilateral.  1st MPJ ROM within normal limits, bilateral.  Dorsally contracted digits present. No other foot deformities. Derm: ulcer sub L hallux 0.5x0.2x0.3cm,  positive fibrin then healthy red granular base, no probing no undermining no malodor. No surrounding signs of infx. , Interdigital maceration absent to web spaces , Bilateral.  Nails b/lthickened, dystrophic and crumbly, discolored with subungual debris. Fissures absent, Bilateral. Hyperkeratotic tissue dorrsal l 4th toe. e      Asessment: Patient is a 71 y.o. male with:    Diagnosis Orders   1. Skin ulcer of left great toe, limited to breakdown of skin (HCC)   DIABETES FOOT EXAM    CO DEBRIDEMENT OPEN WOUND 20 SQ CM<      2. DM type 2 with diabetic peripheral neuropathy (HCC)   DIABETES FOOT EXAM    CO TRIM HYPERKERATOTIC SKIN LESION, ONE    CO DEBRIDEMENT OPEN WOUND 20 SQ CM<      3. Corns and callosities  HM DIABETES FOOT EXAM    CO TRIM HYPERKERATOTIC SKIN LESION, ONE            Ulcer Classification:  Diabetic ulcer grade 1 C  IDSA  no infection. Plan:   Patient examined and evaluated. Diabetic foot education and exam.  Current Active wound management took place 100% non excisional with the use of  tissue nippers. All non viable tissue (including epidermis and/or dermis) and bio burden was removed to promote healing. Bleeding was present. Please see chart for exact measurements, if not documented then size was less than 20 sq cm. Dressing: abx ointment at home qd  Further offloading pads applied to L insole  Patient encouraged to use external steel toe covers. Patient should consider diabetic shoe. Patient is low level medical decision making. Patient is had uncomplicated condition. No new Rx. Low risk morbidity the current treatment course. Paring of 1 benign hyperkeratotic lesions (as listed above) took place with #10 blade or tissue nippers.   Patient advised OTC methods for treatment of the mycotic nails. Patient will follow up in 10 weeks. Contact clinic with any questions/problems/concerns or new signs of infection.  Follow-up at [facility] in 1 week

## 2022-11-17 NOTE — PATIENT INSTRUCTIONS
Discontinue previous dressing. Apply Silvadene, an antimicrobial cream which is a prescription medicine. Apply once or twice a day to your wound as instructed by your doctor. Cover with a dry dressing. Call with any concerns. SIGNS OF INFECTION  - Redness, swelling, skin hot  - Wound bed turns black or stringy yellow  - Foul odor  - Increased drainage or pus  - Increased pain  - Fever greater than 100F    CALL YOUR DOCTOR OR SEEK MEDICAL ATTENTION IF SIGNS OF INFECTION.   DO NOT WAIT UNTIL YOUR NEXT APPOINTMENT    Call the Wound Care Nurse with any other questions or concerns- [phone]

## 2022-11-17 NOTE — PROGRESS NOTES
[address], [address]  [facility] [id number] [phone]        HISTORY:    Carolee Goyal presents today for evaluation and management of:  Chief Complaint   Patient presents with    Diabetes    3 Month Follow-Up       Patient Care Team:  Salud Lemons MD as PCP - General (Internal Medicine)  Salud Lemons MD as PCP - [facility] Provider  AXEL Whitfield - CNP as Nurse Practitioner (Nurse Practitioner)      Interval History:    Past DM Medications   Glimepiride-therapy completed  Starlix-therapy completed  Ozempic-cost     Current Diabetic Medications  Metformin 1000 mg bid     DKA episodes: 0    08/11/22   Carolee Goyal is a 71 y.o. male patient who presents to clinic today for his diabetes. he has a history of HTN, hyperlipidemia, PAD and obesity which contributes to his diabetes. At previous visit diabetes counseling was provided. he denies any current signs or symptoms of hyper/hypoglycemia. he states they are taking their medications as prescribed without any adverse effects. He was recently on antibiotics and steroids. Blood sugars were running high, glimepiride was started briefly but stopped due to hypoglycemia. Diet: low carb  Exercise: none  BS testing: once daily average 140  Issues: denies   Diabetic foot exam up-to-date: Yes  Diabetic retinal exam up-to-date: Yes  Hypoglycemia as needed treatment: snack     11/17/22   Carolee Goyal is a 71 y.o. male patient who presents to clinic today for his diabetes. he has a history of HTN, hyperlipidemia, PAD and obesity  which contributes to his diabetes. At previous visit diabetes counseling was provided. he denies any current signs or symptoms of hyper/hypoglycemia. he states they are taking their medications as prescribed without any adverse effects.    Diet: low carb  Exercise: none  BS testing: fasting range: 120-140, patient tests 1 time(s) per day  Hypoglycemia: No  Hypoglycemia as needed treatment: german  Issues:   Diabetic foot exam up-to-date: Yes  Diabetic retinal exam up-to-date: Yes    Diabetes complications:none      High cholesterol-  Takes crestor and denies any adverse effects with its use. Watches diet and exercise. Hypertension-  Takes lisinopril and denies any adverse effects with their use. Watches diet and exercise. Denies any chest pain, dizziness or edema.           Past Medical History:   Diagnosis Date    Anemia     minimal anemia, hemoglobin 13.6    Benign prostatic hypertrophy     Cyst in hand     distal phalanx, mid finger left hand    Hyperlipidemia     Hypertension     Keratosis     right forehead    Overweight(278.02)     Seborrhea     Type II or unspecified type diabetes mellitus without mention of complication, not stated as uncontrolled      Family History   Problem Relation Age of Onset    Diabetes Mother     Cancer Father         throat cancer    Stroke Father     Cancer Brother 64        pancreatic    Cancer Paternal Uncle         lung     Social History     Tobacco Use    Smoking status: Former     Packs/day: 3.00     Years: 20.00     Pack years: 60.00     Types: Cigarettes     Quit date: 2000     Years since quittin.9    Smokeless tobacco: Never   Vaping Use    Vaping Use: Never used   Substance Use Topics    Alcohol use: Yes     Comment: no alcohol ingestion after a heavier intake eearlier in his life    Drug use: No     No Known Allergies    MEDICATIONS:  Current Outpatient Medications   Medication Sig Dispense Refill    rosuvastatin (CRESTOR) 10 MG tablet TAKE 1 TABLET BY MOUTH EVERY DAY 90 tablet 3    metFORMIN (GLUCOPHAGE-XR) 500 MG extended release tablet TAKE 2 TABLETS BY MOUTH TWO TIMES A  tablet 3    lisinopril (PRINIVIL;ZESTRIL) 10 MG tablet TAKE 1 TABLET BY MOUTH EVERY DAY 90 tablet 3    aspirin 81 MG EC tablet Take 81 mg by mouth daily      blood glucose test strips (ACCU-CHEK BYRON PLUS) strip TEST TWO TIMES DAILY FOR IRREGULAR BLOOD GLUCOSE 200 strip 3    Blood Glucose Monitoring Suppl (ONE TOUCH ULTRA 2) by Does not apply route. Tests blood sugar once a day       No current facility-administered medications for this visit. Review ofSymptoms:  Review of Systems   Constitutional:  Positive for fatigue. Negative for unexpected weight change. Eyes:  Negative for visual disturbance. Respiratory: Negative. Negative for shortness of breath. Cardiovascular:  Negative for chest pain and leg swelling. Gastrointestinal:  Negative for abdominal pain and diarrhea. Endocrine: Negative for polydipsia, polyphagia and polyuria. Genitourinary: Negative. Musculoskeletal: Negative. Skin:  Negative for rash and wound. Neurological:  Negative for dizziness, tremors, seizures and headaches. Psychiatric/Behavioral: Negative. Negative for confusion and decreased concentration. Theremainder of a complete 14-point review of systems is negative. Vital Signs: /72 (Site: Right Upper Arm, Position: Sitting, Cuff Size: Medium Adult)   Pulse 60   Resp 14   Ht 6' (1.829 m)   Wt 212 lb (96.2 kg)   BMI 28.75 kg/m²      Wt Readings from Last 3 Encounters:   11/17/22 212 lb (96.2 kg)   10/27/22 217 lb 12.8 oz (98.8 kg)   10/20/22 217 lb 12.8 oz (98.8 kg)     Body mass index is 28.75 kg/m².   LABS:  Hemoglobin A1C   Date Value Ref Range Status   11/14/2022 6.9 (H) 4.0 - 6.0 % Final   08/08/2022 7.0 (H) 4.0 - 6.0 % Final     Lab Results   Component Value Date    LABMICR Can not be calculated 08/25/2022     Lab Results   Component Value Date     08/25/2022    K 4.8 08/25/2022     08/25/2022    CO2 28 08/25/2022    BUN 20 08/25/2022    CREATININE 0.86 08/25/2022    GLUCOSE 149 (H) 08/25/2022    CALCIUM 9.2 08/25/2022    PROT 6.6 08/25/2022    LABALBU 4.3 08/25/2022    BILITOT 0.40 08/25/2022    ALKPHOS 68 08/25/2022    AST 12 08/25/2022    ALT 14 08/25/2022    LABGLOM >60 08/25/2022    GFRAA >60 08/25/2022     Lab Results   Component Value Date    CHOL 95 08/25/2022    CHOL 93 07/26/2021    CHOL 99 07/23/2020     Lab Results   Component Value Date    TRIG 64 08/25/2022    TRIG 93 07/26/2021    TRIG 91 07/23/2020     Lab Results   Component Value Date    HDL 34 (L) 08/25/2022    HDL 38 (L) 07/26/2021    HDL 29 (L) 07/23/2020     Lab Results   Component Value Date    LDLCHOLESTEROL 48 08/25/2022    LDLCHOLESTEROL 36 07/26/2021    LDLCHOLESTEROL 52 07/23/2020     Lab Results   Component Value Date    VLDL NOT REPORTED 07/26/2021    VLDL NOT REPORTED 07/23/2020    VLDL NOT REPORTED 07/16/2019     Lab Results   Component Value Date    CHOLHDLRATIO 2.8 08/25/2022    CHOLHDLRATIO 2.4 07/26/2021    CHOLHDLRATIO 3.4 07/23/2020           Physical Exam  Constitutional:       Appearance: Normal appearance. He is well-developed. HENT:      Head: Normocephalic. Eyes:      Pupils: Pupils are equal, round, and reactive to light. Cardiovascular:      Rate and Rhythm: Normal rate and regular rhythm. Pulmonary:      Effort: Pulmonary effort is normal.      Breath sounds: Normal breath sounds. Musculoskeletal:         General: Normal range of motion. Cervical back: Normal range of motion. Skin:     General: Skin is warm and dry. Findings: No lesion (no lipohypertrophy) or rash. Neurological:      Mental Status: He is alert and oriented to person, place, and time. Mental status is at baseline. Sensory: No sensory deficit. Psychiatric:         Mood and Affect: Mood normal.         Speech: Speech normal.         Behavior: Behavior normal.         Thought Content: Thought content normal.         Judgment: Judgment normal.           ASSESSMENT/PLAN:     Diagnosis Orders   1. Type 2 diabetes mellitus without complication, without long-term current use of insulin (HCC)  Hemoglobin A1C      2. Other hyperlipidemia        3.  Essential hypertension Orders Placed This Encounter   Procedures    Hemoglobin A1C     No orders of the defined types were placed in this encounter. Requested Prescriptions      No prescriptions requested or ordered in this encounter       1. Type 2 diabetes mellitus without complication, without long-term current use of insulin (HCC)  - Stable  HbA1C goal is less than 7%. - Fasting blood glucose goal is 70-120mg/dl and postprandial blood sugar goal is less than 180 mg/dl. - Labs reviewed including most recent A1c, UACR and kidney function. Repeat labs due in 3 months.    -We discussed in great detail dietary modifications they can make to better improve their blood sugars. -follow up diabetes education completed, all questions answered. Patient Instructions   Check in to price of farxiga   Labs before next visit       Discussed signs and symptoms of hyper/hypoglycemia and how to treat. Encouraged 150 minutes of physical activity per week. Follow a low carbohydrate diet. Encouraged at least 7 hours of sleep. The patient was informed of the goals of diabetes management. This can only be accomplished by watching their diet and exercise levels. We certainly use medicines to help attain these goals. The consequences of not controlling blood sugars were discussed. These include blindness, heart disease, stroke, kidney disease, and possibly need for dialysis. They were told to be careful with their foot care as diabetics often have nerve damage, infections and risk for limb amutations . They also need a dilated eye exam yearly. We discussed the issues of diet, exercise, medication, complication avoidance, reviewed the signs and symptoms of diabetes, hypoglycemic episodes, significance of HbA1C.     - Hemoglobin A1C; Future    2. Other hyperlipidemia  stable, lipid panel reviewed, continue current medications. Diet and exercise. 3. Essential hypertension   stable, continue current medications.  Diet and exercise Seek emergent care if chest pain develops. Answered all patient questions. Agrees to follow plan of care and to follow up in 3 months, sooner if needed. Call office if unexplained blood sugars less than 70 occur or above 400. Call office or access MyChart with any further questions or concerns. Be sure to bring glucometer/food log at next appointment. Total time spent reviewing chart, labs, counseling patient and documenting on the date of the encounter: 30 min.       Electronically signed by AXEL Oneill CNP on 11/17/2022 at 8:22 AM      (Please note that portions of this note were completed with a voice-recognition program. Efforts were made to edit the dictation but occasionally words are mis-transcribed.)

## 2022-12-01 ENCOUNTER — OFFICE VISIT (OUTPATIENT)
Dept: PODIATRY | Age: 72
End: 2022-12-01
Payer: MEDICARE

## 2022-12-01 VITALS
TEMPERATURE: 98.1 F | DIASTOLIC BLOOD PRESSURE: 60 MMHG | WEIGHT: 213 LBS | BODY MASS INDEX: 28.85 KG/M2 | HEART RATE: 74 BPM | SYSTOLIC BLOOD PRESSURE: 100 MMHG | HEIGHT: 72 IN

## 2022-12-01 DIAGNOSIS — L97.521 SKIN ULCER OF LEFT GREAT TOE, LIMITED TO BREAKDOWN OF SKIN (HCC): Primary | ICD-10-CM

## 2022-12-01 DIAGNOSIS — E11.42 DM TYPE 2 WITH DIABETIC PERIPHERAL NEUROPATHY (HCC): ICD-10-CM

## 2022-12-01 PROCEDURE — 97597 DBRDMT OPN WND 1ST 20 CM/<: CPT | Performed by: PODIATRIST

## 2022-12-01 PROCEDURE — 99212 OFFICE O/P EST SF 10 MIN: CPT | Performed by: PODIATRIST

## 2022-12-01 NOTE — PROGRESS NOTES
Subjective:    Pavel Harris is a 71 y.o. male who presents with ulcer L big toe. Pt think it has looked better at home. Patient relates pain is Absent . Pain is rated 0 out of 10 and is described as none. Currently denies F/C/N/V. Overall diabetic control is worsened. No Known Allergies    Past Medical History:   Diagnosis Date    Anemia     minimal anemia, hemoglobin 13.6    Benign prostatic hypertrophy     Cyst in hand     distal phalanx, mid finger left hand    Hyperlipidemia     Hypertension     Keratosis     right forehead    Overweight(278.02)     Seborrhea     Type II or unspecified type diabetes mellitus without mention of complication, not stated as uncontrolled        Prior to Admission medications    Medication Sig Start Date End Date Taking? Authorizing Provider   rosuvastatin (CRESTOR) 10 MG tablet TAKE 1 TABLET BY MOUTH EVERY DAY 8/15/22  Yes Jonnie Perez MD   blood glucose test strips (ACCU-CHEK BYRON PLUS) strip TEST TWO TIMES DAILY FOR IRREGULAR BLOOD GLUCOSE 22  Yes Crystal Vic Foots, APRN - CNP   metFORMIN (GLUCOPHAGE-XR) 500 MG extended release tablet TAKE 2 TABLETS BY MOUTH TWO TIMES A DAY 22  Yes Jonnie Perez MD   lisinopril (PRINIVIL;ZESTRIL) 10 MG tablet TAKE 1 TABLET BY MOUTH EVERY DAY 22  Yes Jonnie Perez MD   aspirin 81 MG EC tablet Take 81 mg by mouth daily   Yes Historical Provider, MD   Blood Glucose Monitoring Suppl (ONE TOUCH ULTRA 2) by Does not apply route.  Tests blood sugar once a day   Yes Historical Provider, MD       Social History     Tobacco Use    Smoking status: Former     Packs/day: 3.00     Years: 20.00     Pack years: 60.00     Types: Cigarettes     Quit date: 2000     Years since quittin.9    Smokeless tobacco: Never   Substance Use Topics    Alcohol use: Yes     Comment: no alcohol ingestion after a heavier intake joshuakarineer in his life       ROS: All 14 ROS systems reviewed and pertinent positives noted above, all others negative. Lower Extremity Physical Examination:     Vitals:   Vitals:    12/01/22 0746   BP: 100/60   Pulse: 74   Temp: 98.1 °F (36.7 °C)     General: AAO x 3 in NAD. Vascular: DP and PT pulses palpable 2/4, bilateral.  CFT <3 seconds, bilateral.  Hair growth absent to the level of the digits, bilateral.  Edema present, bilateral.  Varicosities present, bilateral. Erythema present L 4th toe. Distal Rubor absent bilateral.  Temperature decreased bilateral. Hyperpigmentation present bilateral. Atrophic skin no    Neurological: Sensation Impaired to light touch to level of digits, bilateral.  Protective sensation intact  0/10 sites via 5.07/10g Ellsworth-Katia Monofilament, bilateral.  negative Tinel's, bilateral.  negative Valleix sign, bilateral.  Vibratory abnormal  bilateral.  Reflexes Decreased bilateral.  Paresthesias positive. Dysthesias negative. Sharp/dull abnormal  bilateral.    Musculoskeletal: Muscle strength 5/5, bilateral.  Pain absent upon palpation bilateral. Normal medial longitudinal arch, bilateral.  Ankle ROM within normal limits,bilateral.  1st MPJ ROM within normal limits, bilateral.  Dorsally contracted digits present. No other foot deformities. Derm: ulcer sub L hallux 0.3x0.2x0.2cm,  positive fibrin then healthy red granular base, no probing no undermining no malodor. No surrounding signs of infx. , Interdigital maceration absent to web spaces , Bilateral.  Nails b/lthickened, dystrophic and crumbly, discolored with subungual debris. Fissures absent, Bilateral. Hyperkeratotic tissue dorrsal l 4th toe. e      Asessment: Patient is a 71 y.o. male with:    Diagnosis Orders   1. Skin ulcer of left great toe, limited to breakdown of skin ([address]        2. DM type 2 with diabetic peripheral neuropathy (HCC)              Ulcer Classification:  Diabetic ulcer grade 1 C  IDSA  no infection. Plan:   Patient examined and evaluated.  Diabetic foot education and exam.  Current Active wound management took place 100% non excisional with the use of  tissue nippers. All non viable tissue (including epidermis and/or dermis) and bio burden was removed to promote healing. Bleeding was present. Please see chart for exact measurements, if not documented then size was less than 20 sq cm. Dressing: abx ointment at home qd  Patient encouraged to use external steel toe covers. Patient should consider diabetic shoe. Contact clinic with any questions/problems/concerns or new signs of infection.  Follow-up at [facility] in 1 week

## 2022-12-09 ENCOUNTER — OFFICE VISIT (OUTPATIENT)
Dept: OTOLARYNGOLOGY | Age: 72
End: 2022-12-09
Payer: MEDICARE

## 2022-12-09 VITALS
BODY MASS INDEX: 28.85 KG/M2 | DIASTOLIC BLOOD PRESSURE: 72 MMHG | SYSTOLIC BLOOD PRESSURE: 130 MMHG | HEART RATE: 67 BPM | WEIGHT: 213 LBS | TEMPERATURE: 97.9 F | HEIGHT: 72 IN

## 2022-12-09 DIAGNOSIS — L81.8 AMALGAM TATTOO, ORAL: Primary | ICD-10-CM

## 2022-12-09 DIAGNOSIS — K13.70 ORAL LESION: ICD-10-CM

## 2022-12-09 PROCEDURE — G8427 DOCREV CUR MEDS BY ELIG CLIN: HCPCS | Performed by: OTOLARYNGOLOGY

## 2022-12-09 PROCEDURE — 3074F SYST BP LT 130 MM HG: CPT | Performed by: OTOLARYNGOLOGY

## 2022-12-09 PROCEDURE — G8484 FLU IMMUNIZE NO ADMIN: HCPCS | Performed by: OTOLARYNGOLOGY

## 2022-12-09 PROCEDURE — 3078F DIAST BP <80 MM HG: CPT | Performed by: OTOLARYNGOLOGY

## 2022-12-09 PROCEDURE — 99212 OFFICE O/P EST SF 10 MIN: CPT | Performed by: OTOLARYNGOLOGY

## 2022-12-09 PROCEDURE — 1123F ACP DISCUSS/DSCN MKR DOCD: CPT | Performed by: OTOLARYNGOLOGY

## 2022-12-09 PROCEDURE — 1036F TOBACCO NON-USER: CPT | Performed by: OTOLARYNGOLOGY

## 2022-12-09 PROCEDURE — 99213 OFFICE O/P EST LOW 20 MIN: CPT | Performed by: OTOLARYNGOLOGY

## 2022-12-09 PROCEDURE — 3017F COLORECTAL CA SCREEN DOC REV: CPT | Performed by: OTOLARYNGOLOGY

## 2022-12-09 PROCEDURE — G8417 CALC BMI ABV UP PARAM F/U: HCPCS | Performed by: OTOLARYNGOLOGY

## 2022-12-09 NOTE — PROGRESS NOTES
2022 1:44 PM EDT  Radha Miranda (:  1950) is a 71 y.o. male,New patient, here for evaluation of the following chief complaint(s):  Follow-up (3 month follow up gum lesion)      ASSESSMENT/PLAN:  1. Amalgam tattoo, oral    2. Oral lesion      1. Amalgam tattoo, oral  2. Oral lesion  Patient is resistant to pursue further dental work at this time due to financial issues and is looking to touch base with dentist again early in . Right buccal mucosal lesion most consistent with a venous lake type lesion. Left buccal mucosa lesion is most consistent with a dental amalgam tattoo. Patient states that the dental office have never commented on an oral lesion. Evidence of bluish discoloration around the base of several teeth. Extensive filling history. Discussed possible biopsy if there are any changes. Recommend follow-up with dentist to also specifically addressed the possibility of a dental amalgam tattoo. Fu with PCP to check the lesions    No follow-ups on file. SUBJECTIVE/OBJECTIVE:  HPI  30-year-old man who presented to urgent care on  with a 2-day history of pain associated with the left central incisor that has recently had a crown put on it without a root canal.  He woke up in the morning of  with facial swelling associated with the left upper lip, eye, cheek. He was treated with Augmentin and prednisone. He has been monitoring his blood glucose levels. The swelling and pain have resolved. At the time of the evaluation he was noted to have 2 lesions of the posterior buccal mucosa and was referred for further evaluation. He sees Dr. Miranda Griffiths. Follows up today for 3 month check. Doing well. No changes.     Past Medical History:   Diagnosis Date    Anemia     minimal anemia, hemoglobin 13.6    Benign prostatic hypertrophy     Cyst in hand     distal phalanx, mid finger left hand    Hyperlipidemia     Hypertension     Keratosis     right forehead    Overweight(278.02) Seborrhea     Type II or unspecified type diabetes mellitus without mention of complication, not stated as uncontrolled      Past Surgical History:   Procedure Laterality Date    COLONOSCOPY N/A 9/14/2020    diffuse severe diverticulosis, TA, hyperplastic polyp.  at [facility]     Social History       Tobacco History       Smoking Status  Former Quit Date  12/9/2000 Smoking Frequency  3.00 packs/day for 20.00 years (60.00 pk-yrs) Smoking Tobacco Type  Cigarettes quit in 12/9/2000      Smokeless Tobacco Use  Never              Alcohol History       Alcohol Use Status  Yes Comment  no alcohol ingestion after a heavier intake eearlier in his life              Drug Use       Drug Use Status  No              Sexual Activity       Sexually Active  Not Asked                  Family History   Problem Relation Age of Onset    Diabetes Mother     Cancer Father         throat cancer    Stroke Father     Cancer Brother 64        pancreatic    Cancer Paternal Uncle         lung     Current Outpatient Medications   Medication Instructions    aspirin 81 mg, Oral, DAILY    Blood Glucose Monitoring Suppl (ONE TOUCH ULTRA 2) by Does not apply route. Tests blood sugar once a day    blood glucose test strips (ACCU-CHEK BYRON PLUS) strip TEST TWO TIMES DAILY FOR IRREGULAR BLOOD GLUCOSE    lisinopril (PRINIVIL;ZESTRIL) 10 MG tablet TAKE 1 TABLET BY MOUTH EVERY DAY    metFORMIN (GLUCOPHAGE-XR) 500 MG extended release tablet TAKE 2 TABLETS BY MOUTH TWO TIMES A DAY    rosuvastatin (CRESTOR) 10 MG tablet TAKE 1 TABLET BY MOUTH EVERY DAY     No Known Allergies    ENT ROS: positive for -oral lesions    General: The patient is found to be alert and normally responsive male. Hearing: grossly normal   Voice: Clear   Skin: The skin has normal colour and turgor. Face: The facial contour is symmetric at rest and with movement. Ears: The pinnae have normal contours.     AD: EAC clear, TM intact, no effusion/erythema/retraction   AS: EAC clear, TM intact, no effusion/erythema/retraction   Eye: The ocular movements are full and symmetric, the conjunctiva is unremarkable; sclera are anicteric, pupillary response is symmetric. No nystagmus is found. Nose:   The external nasal contour is normal  The nasal mucosa has a normal appearance. The nasal septum is straight. The turbinates have a normal appearance  The nares are patent without evidence of polyposis   Oral cavity:   The dentition is healthy. The oral mucosa right posterior buccal mucosa has a raised, well-circumscribed, nonulcerative lesion of deep venous coloration most consistent with venous lake, about 1x1cm; left posterior buccal mucosa has irregular deep purple discoloration directly adjacent to the posterior distal aspect of the most distal mandibular molar which has extensive silver filling material;  the tongue is symmetric with full mobility and is without fasciculation. The soft palate is symmetric. The oropharynx is unremarkable. Neck: The neck has a normal contour; no masses are found on palpation        An electronic signature was used to authenticate this note.     --Char Mcmullen MD     12/9/2022 1:44 PM EDT

## 2022-12-16 ENCOUNTER — OFFICE VISIT (OUTPATIENT)
Dept: PODIATRY | Age: 72
End: 2022-12-16
Payer: MEDICARE

## 2022-12-16 VITALS
SYSTOLIC BLOOD PRESSURE: 126 MMHG | WEIGHT: 214 LBS | DIASTOLIC BLOOD PRESSURE: 76 MMHG | HEIGHT: 72 IN | HEART RATE: 74 BPM | BODY MASS INDEX: 28.99 KG/M2

## 2022-12-16 DIAGNOSIS — E11.42 DM TYPE 2 WITH DIABETIC PERIPHERAL NEUROPATHY (HCC): ICD-10-CM

## 2022-12-16 DIAGNOSIS — L97.521 SKIN ULCER OF LEFT GREAT TOE, LIMITED TO BREAKDOWN OF SKIN (HCC): Primary | ICD-10-CM

## 2022-12-16 PROCEDURE — 1036F TOBACCO NON-USER: CPT | Performed by: PODIATRIST

## 2022-12-16 PROCEDURE — G8484 FLU IMMUNIZE NO ADMIN: HCPCS | Performed by: PODIATRIST

## 2022-12-16 PROCEDURE — 3017F COLORECTAL CA SCREEN DOC REV: CPT | Performed by: PODIATRIST

## 2022-12-16 PROCEDURE — G8417 CALC BMI ABV UP PARAM F/U: HCPCS | Performed by: PODIATRIST

## 2022-12-16 PROCEDURE — 3074F SYST BP LT 130 MM HG: CPT | Performed by: PODIATRIST

## 2022-12-16 PROCEDURE — 99212 OFFICE O/P EST SF 10 MIN: CPT | Performed by: PODIATRIST

## 2022-12-16 PROCEDURE — 1123F ACP DISCUSS/DSCN MKR DOCD: CPT | Performed by: PODIATRIST

## 2022-12-16 PROCEDURE — 3044F HG A1C LEVEL LT 7.0%: CPT | Performed by: PODIATRIST

## 2022-12-16 PROCEDURE — 2022F DILAT RTA XM EVC RTNOPTHY: CPT | Performed by: PODIATRIST

## 2022-12-16 PROCEDURE — 3078F DIAST BP <80 MM HG: CPT | Performed by: PODIATRIST

## 2022-12-16 PROCEDURE — G8427 DOCREV CUR MEDS BY ELIG CLIN: HCPCS | Performed by: PODIATRIST

## 2022-12-16 PROCEDURE — 99213 OFFICE O/P EST LOW 20 MIN: CPT | Performed by: PODIATRIST

## 2022-12-16 NOTE — PROGRESS NOTES
Subjective:    Jatinder Meléndez is a 71 y.o. male who presents with ulcer L big toe. No issues seen at home. No signs of infx seen at home. Currently denies F/C/N/V. Overall diabetic control is worsened. No Known Allergies    Past Medical History:   Diagnosis Date    Anemia     minimal anemia, hemoglobin 13.6    Benign prostatic hypertrophy     Cyst in hand     distal phalanx, mid finger left hand    Hyperlipidemia     Hypertension     Keratosis     right forehead    Overweight(278.02)     Seborrhea     Type II or unspecified type diabetes mellitus without mention of complication, not stated as uncontrolled        Prior to Admission medications    Medication Sig Start Date End Date Taking? Authorizing Provider   rosuvastatin (CRESTOR) 10 MG tablet TAKE 1 TABLET BY MOUTH EVERY DAY 8/15/22  Yes Lamin Epstein MD   blood glucose test strips (ACCU-CHEK BYRON PLUS) strip TEST TWO TIMES DAILY FOR IRREGULAR BLOOD GLUCOSE 22  Yes AXEL Smith - CNP   metFORMIN (GLUCOPHAGE-XR) 500 MG extended release tablet TAKE 2 TABLETS BY MOUTH TWO TIMES A DAY 22  Yes Lamin Epstein MD   lisinopril (PRINIVIL;ZESTRIL) 10 MG tablet TAKE 1 TABLET BY MOUTH EVERY DAY 22  Yes Lamin Epstein MD   aspirin 81 MG EC tablet Take 81 mg by mouth daily   Yes Historical Provider, MD   Blood Glucose Monitoring Suppl (ONE TOUCH ULTRA 2) by Does not apply route. Tests blood sugar once a day   Yes Historical Provider, MD       Social History     Tobacco Use    Smoking status: Former     Packs/day: 3.00     Years: 20.00     Pack years: 60.00     Types: Cigarettes     Quit date: 2000     Years since quittin.0    Smokeless tobacco: Never   Substance Use Topics    Alcohol use: Yes     Comment: no alcohol ingestion after a heavier intake eearlier in his life       ROS: All 14 ROS systems reviewed and pertinent positives noted above, all others negative.     Lower Extremity Physical Examination:     Vitals: Vitals:    12/16/22 0909   BP: 126/76   Pulse: 74     General: AAO x 3 in NAD. Vascular: DP and PT pulses palpable 2/4, bilateral.  CFT <3 seconds, bilateral.  Hair growth absent to the level of the digits, bilateral.  Edema present, bilateral.  Varicosities present, bilateral. Erythema present L 4th toe. Distal Rubor absent bilateral.  Temperature decreased bilateral. Hyperpigmentation present bilateral. Atrophic skin no    Neurological: Sensation Impaired to light touch to level of digits, bilateral.  Protective sensation intact  0/10 sites via 5.07/10g [place] Monofilament, bilateral.  negative Tinel's, bilateral.  negative Valleix sign, bilateral.  Vibratory abnormal  bilateral.  Reflexes Decreased bilateral.  Paresthesias positive. Dysthesias negative. Sharp/dull abnormal  bilateral.    Musculoskeletal: Muscle strength 5/5, bilateral.  Pain absent upon palpation bilateral. Normal medial longitudinal arch, bilateral.  Ankle ROM within normal limits,bilateral.  1st MPJ ROM within normal limits, bilateral.  Dorsally contracted digits present. No other foot deformities. Derm: ulcer sub L hallux resolved. Interdigital maceration absent to web spaces , Bilateral.  Nails b/lthickened, dystrophic and crumbly, discolored with subungual debris. Fissures absent, Bilateral. Hyperkeratotic tissue dorsal l 4th toe. Asessment: Patient is a 71 y.o. male with:    Diagnosis Orders   1. Skin ulcer of left great toe, limited to breakdown of skin ([address]        2. DM type 2 with diabetic peripheral neuropathy (HCC)              Ulcer Classification:  Diabetic ulcer grade 1 C resolved  IDSA  no infection. Plan:   Patient examined and evaluated. Diabetic foot education and exam.  Current No debridement needed of the ulcer today. The ulcer is completley resolved at this time. Pt was educated about means of prevention and risk factor modification.   Pt should return to the clinic PRN if any further ulceration should occur. Patient low level medical decision making. Patient stable chronic condition. No new testing or prescriptions. Lower extremity due to high possibility of recurrence of ulcer  Patient encouraged to use external steel toe covers. Patient should consider diabetic shoe. Contact clinic with any questions/problems/concerns or new signs of infection. Follow-up at [facility] in [facility]. Near future for callous care.

## 2023-02-02 ENCOUNTER — OFFICE VISIT (OUTPATIENT)
Dept: PODIATRY | Age: 73
End: 2023-02-02
Payer: MEDICARE

## 2023-02-02 ENCOUNTER — TELEPHONE (OUTPATIENT)
Dept: INTERNAL MEDICINE | Age: 73
End: 2023-02-02

## 2023-02-02 VITALS
WEIGHT: 218.6 LBS | DIASTOLIC BLOOD PRESSURE: 74 MMHG | BODY MASS INDEX: 29.61 KG/M2 | RESPIRATION RATE: 20 BRPM | HEART RATE: 84 BPM | HEIGHT: 72 IN | SYSTOLIC BLOOD PRESSURE: 128 MMHG

## 2023-02-02 DIAGNOSIS — L84 CORNS AND CALLOSITIES: ICD-10-CM

## 2023-02-02 DIAGNOSIS — E11.42 DM TYPE 2 WITH DIABETIC PERIPHERAL NEUROPATHY (HCC): Primary | ICD-10-CM

## 2023-02-02 PROCEDURE — 11056 PARNG/CUTG B9 HYPRKR LES 2-4: CPT | Performed by: PODIATRIST

## 2023-02-02 PROCEDURE — 99999 PR OFFICE/OUTPT VISIT,PROCEDURE ONLY: CPT | Performed by: PODIATRIST

## 2023-02-02 NOTE — TELEPHONE ENCOUNTER
Patient stopped in stating that he tried to check on the cost of Therman Cholo but that he needs to know what dose crystal would want to start him on and how often he would be taking it before pharmacy will give him a price. Please advise.

## 2023-02-02 NOTE — PROGRESS NOTES
Foot Care Worksheet  PCP: Samantha Foreman MD  Last visit: 09 / 01 / 2022    Nail description: Thick , Yellow , Crumbly , Marked limitation of ambulation     Pain resulting from thickened and dystrophy of nail plate No    Nails involved  Right     (T5-T9)  Left       (TA-T4)    Q7 1 Class A Finding - Non traumatic amputation of foot No    Q8 2 Class B Findings - Absent DP pulse No, Absent PT pulse No, Advanced tropic changes (3 required) Yes    Decrease hair growth Yes, Nail changes/thickening Yes, Pigmented changes/discoloration Yes, Skin texture (thin, shiny) No, Skin color (rubor/redness) No    Q9 1 Class B and 2 Class C Findings  Claudication No, Temperature change Yes, Paresthesia Yes, Burning No, Edema Yes    Subjective:  [facility] diabetic [facility] care. Patient denies any new problems with their feet. Patient's diabetic control has been not changed. No Known Allergies    Past Medical History:   Diagnosis Date    Anemia     minimal anemia, hemoglobin 13.6    Benign prostatic hypertrophy     Cyst in hand     distal phalanx, mid finger left hand    Hyperlipidemia     Hypertension     Keratosis     right forehead    Overweight(278.02)     Seborrhea     Type II or unspecified type diabetes mellitus without mention of complication, not stated as uncontrolled        Prior to Admission medications    Medication Sig Start Date End Date Taking?  Authorizing Provider   rosuvastatin (CRESTOR) 10 MG tablet TAKE 1 TABLET BY MOUTH EVERY DAY 8/15/22   Samantha Foreman MD   blood glucose test strips (ACCU-CHEK BYRON PLUS) strip TEST TWO TIMES DAILY FOR IRREGULAR BLOOD GLUCOSE 8/11/22   AXEL Gibbs - CNP   metFORMIN (GLUCOPHAGE-XR) 500 MG extended release tablet TAKE 2 TABLETS BY MOUTH TWO TIMES A DAY 2/24/22   Samantha Foreman MD   lisinopril (PRINIVIL;ZESTRIL) 10 MG tablet TAKE 1 TABLET BY MOUTH EVERY DAY 2/24/22   Samantha Foreman MD   aspirin 81 MG EC tablet Take 81 mg by mouth daily    Historical Provider, MD   Blood Glucose Monitoring Suppl (ONE TOUCH ULTRA 2) by Does not apply route. Tests blood sugar once a day    Historical Provider, MD       Social History     Tobacco Use    Smoking status: Former     Packs/day: 3.00     Years: 20.00     Pack years: 60.00     Types: Cigarettes     Quit date: 2000     Years since quittin.1    Smokeless tobacco: Never   Substance Use Topics    Alcohol use: Yes     Comment: no alcohol ingestion after a heavier intake eearlier in his life     ROS: All 14 ROS systems reviewed and pertinent positives noted above, all others negative. Objective:  Vascular: DP and PT pulses palpable 2/4, bilateral.  CFT <3 seconds, bilateral.  Hair growth absent to the level of the digits, bilateral.  Edema present, bilateral.  Varicosities present, bilateral. Erythema absent, bilateral. Distal Rubor absent bilateral.  Temperature decreased bilateral. Hyperpigmentation present bilateral. Atrophic skin no    Neurological: Sensation intact to light touch to level of digits, bilateral.  Protective sensation intact 10/10 sites via 5.07/10g [place] Monofilament, bilateral.  negative Tinel's, bilateral.  negative Valleix sign, bilateral.  Vibratory intact bilateral.  Reflexes Decreased bilateral.  Paresthesias negative. Dysthesias negative. Sharp/dull intact bilateral.     Integument: Warm, dry, supple, Bilateral.  Open lesion absent, Bilateral.  Interdigital maceration absent to web spaces,absent Bilateral.  Nails b/l  thickened, dystrophic and crumbly, discolored with subungual debris. Fissures absent, Bilateral. Hyperkeratotic tissue is present, seen sub l hallux and dorsolateral l 4th toe. Assessment:    Diagnosis Orders   1. DM type 2 with diabetic peripheral neuropathy ([facility] [address]        2.  Corns and callosities            Plan:  Diabetic foot education and exam.  Paring of 2 benign hyperkeratotic lesions took place with #10 blade or tissue nippers. Patient advised about OTC treatments for nails and callous. Patient will follow up in 10 weeks for routine foot care or PRN if any further problems arise.

## 2023-02-11 DIAGNOSIS — I10 ESSENTIAL HYPERTENSION: ICD-10-CM

## 2023-02-11 DIAGNOSIS — E11.9 TYPE 2 DIABETES MELLITUS WITHOUT COMPLICATION, WITHOUT LONG-TERM CURRENT USE OF INSULIN (HCC): ICD-10-CM

## 2023-02-13 RX ORDER — METFORMIN HYDROCHLORIDE 500 MG/1
TABLET, EXTENDED RELEASE ORAL
Qty: 360 TABLET | Refills: 0 | Status: SHIPPED | OUTPATIENT
Start: 2023-02-13

## 2023-02-13 RX ORDER — LISINOPRIL 10 MG/1
TABLET ORAL
Qty: 90 TABLET | Refills: 0 | Status: SHIPPED | OUTPATIENT
Start: 2023-02-13

## 2023-02-13 NOTE — TELEPHONE ENCOUNTER
Kelvin Barron called requesting a refill of the below medication which has been pended for you:     Requested Prescriptions     Pending Prescriptions Disp Refills    lisinopril (PRINIVIL;ZESTRIL) 10 MG tablet [Pharmacy Med Name: Lisinopril Oral Tablet 10 MG] 90 tablet 0     Sig: TAKE 1 TABLET BY MOUTH EVERY DAY    metFORMIN (GLUCOPHAGE-XR) 500 MG extended release tablet [Pharmacy Med Name: metFORMIN HCl ER Oral Tablet Extended Release 24 Hour 500 MG] 360 tablet 0     Sig: TAKE 2 TABLETS BY MOUTH TWO TIMES A DAY       Last Appointment Date: 9/1/2022  Next Appointment Date: 3/2/2023    No Known Allergies

## 2023-02-23 ENCOUNTER — HOSPITAL ENCOUNTER (OUTPATIENT)
Age: 73
Discharge: HOME | End: 2023-02-23
Payer: MEDICARE

## 2023-02-23 DIAGNOSIS — D50.9 IRON DEFICIENCY ANEMIA, UNSPECIFIED IRON DEFICIENCY ANEMIA TYPE: ICD-10-CM

## 2023-02-23 DIAGNOSIS — E11.9 TYPE 2 DIABETES MELLITUS WITHOUT COMPLICATION, WITHOUT LONG-TERM CURRENT USE OF INSULIN (HCC): ICD-10-CM

## 2023-02-23 LAB
EST. AVERAGE GLUCOSE BLD GHB EST-MCNC: 160 MG/DL
HBA1C MFR BLD: 7.2 % (ref 4–6)
HGB BLD-MCNC: 13.2 G/DL (ref 13–17)
IRON SATURATION: 27 % (ref 20–55)
IRON SERPL-MCNC: 69 UG/DL (ref 59–158)
TIBC SERPL-MCNC: 257 UG/DL (ref 250–450)
UNSATURATED IRON BINDING CAPACITY: 188 UG/DL (ref 112–347)

## 2023-02-23 PROCEDURE — 83036 HEMOGLOBIN GLYCOSYLATED A1C: CPT

## 2023-02-23 PROCEDURE — 36415 COLL VENOUS BLD VENIPUNCTURE: CPT

## 2023-02-23 PROCEDURE — 83540 ASSAY OF IRON: CPT

## 2023-02-23 PROCEDURE — 85018 HEMOGLOBIN: CPT

## 2023-02-23 PROCEDURE — 83550 IRON BINDING TEST: CPT

## 2023-03-02 ENCOUNTER — OFFICE VISIT (OUTPATIENT)
Dept: INTERNAL MEDICINE | Age: 73
End: 2023-03-02
Payer: MEDICARE

## 2023-03-02 ENCOUNTER — OFFICE VISIT (OUTPATIENT)
Dept: DIABETES SERVICES | Age: 73
End: 2023-03-02
Payer: MEDICARE

## 2023-03-02 VITALS
DIASTOLIC BLOOD PRESSURE: 68 MMHG | BODY MASS INDEX: 29.39 KG/M2 | WEIGHT: 217 LBS | RESPIRATION RATE: 18 BRPM | SYSTOLIC BLOOD PRESSURE: 120 MMHG | HEART RATE: 60 BPM | HEIGHT: 72 IN

## 2023-03-02 VITALS
BODY MASS INDEX: 29.39 KG/M2 | DIASTOLIC BLOOD PRESSURE: 68 MMHG | OXYGEN SATURATION: 96 % | SYSTOLIC BLOOD PRESSURE: 120 MMHG | HEART RATE: 60 BPM | RESPIRATION RATE: 16 BRPM | WEIGHT: 217 LBS | HEIGHT: 72 IN

## 2023-03-02 DIAGNOSIS — Z12.5 SPECIAL SCREENING FOR MALIGNANT NEOPLASM OF PROSTATE: ICD-10-CM

## 2023-03-02 DIAGNOSIS — Z00.00 GENERAL MEDICAL EXAM: Primary | ICD-10-CM

## 2023-03-02 DIAGNOSIS — I10 PRIMARY HYPERTENSION: ICD-10-CM

## 2023-03-02 DIAGNOSIS — E11.9 TYPE 2 DIABETES MELLITUS WITHOUT COMPLICATION, WITHOUT LONG-TERM CURRENT USE OF INSULIN (HCC): ICD-10-CM

## 2023-03-02 DIAGNOSIS — Z00.00 MEDICARE ANNUAL WELLNESS VISIT, SUBSEQUENT: ICD-10-CM

## 2023-03-02 DIAGNOSIS — I10 ESSENTIAL HYPERTENSION: ICD-10-CM

## 2023-03-02 DIAGNOSIS — E78.49 OTHER HYPERLIPIDEMIA: ICD-10-CM

## 2023-03-02 DIAGNOSIS — I73.9 PAD (PERIPHERAL ARTERY DISEASE) (HCC): ICD-10-CM

## 2023-03-02 DIAGNOSIS — E11.9 TYPE 2 DIABETES MELLITUS WITHOUT COMPLICATION, WITHOUT LONG-TERM CURRENT USE OF INSULIN (HCC): Primary | ICD-10-CM

## 2023-03-02 DIAGNOSIS — D50.9 IRON DEFICIENCY ANEMIA, UNSPECIFIED IRON DEFICIENCY ANEMIA TYPE: ICD-10-CM

## 2023-03-02 PROCEDURE — 3078F DIAST BP <80 MM HG: CPT | Performed by: NURSE PRACTITIONER

## 2023-03-02 PROCEDURE — G8484 FLU IMMUNIZE NO ADMIN: HCPCS | Performed by: NURSE PRACTITIONER

## 2023-03-02 PROCEDURE — 1123F ACP DISCUSS/DSCN MKR DOCD: CPT | Performed by: NURSE PRACTITIONER

## 2023-03-02 PROCEDURE — 99213 OFFICE O/P EST LOW 20 MIN: CPT | Performed by: NURSE PRACTITIONER

## 2023-03-02 PROCEDURE — 2022F DILAT RTA XM EVC RTNOPTHY: CPT | Performed by: NURSE PRACTITIONER

## 2023-03-02 PROCEDURE — 3074F SYST BP LT 130 MM HG: CPT | Performed by: NURSE PRACTITIONER

## 2023-03-02 PROCEDURE — G8417 CALC BMI ABV UP PARAM F/U: HCPCS | Performed by: NURSE PRACTITIONER

## 2023-03-02 PROCEDURE — 99397 PER PM REEVAL EST PAT 65+ YR: CPT | Performed by: INTERNAL MEDICINE

## 2023-03-02 PROCEDURE — 3051F HG A1C>EQUAL 7.0%<8.0%: CPT | Performed by: NURSE PRACTITIONER

## 2023-03-02 PROCEDURE — 99214 OFFICE O/P EST MOD 30 MIN: CPT | Performed by: NURSE PRACTITIONER

## 2023-03-02 PROCEDURE — 1036F TOBACCO NON-USER: CPT | Performed by: NURSE PRACTITIONER

## 2023-03-02 PROCEDURE — G8427 DOCREV CUR MEDS BY ELIG CLIN: HCPCS | Performed by: NURSE PRACTITIONER

## 2023-03-02 PROCEDURE — 3017F COLORECTAL CA SCREEN DOC REV: CPT | Performed by: NURSE PRACTITIONER

## 2023-03-02 SDOH — ECONOMIC STABILITY: FOOD INSECURITY: WITHIN THE PAST 12 MONTHS, THE FOOD YOU BOUGHT JUST DIDN'T LAST AND YOU DIDN'T HAVE MONEY TO GET MORE.: NEVER TRUE

## 2023-03-02 SDOH — ECONOMIC STABILITY: INCOME INSECURITY: HOW HARD IS IT FOR YOU TO PAY FOR THE VERY BASICS LIKE FOOD, HOUSING, MEDICAL CARE, AND HEATING?: NOT HARD AT ALL

## 2023-03-02 SDOH — ECONOMIC STABILITY: HOUSING INSECURITY
IN THE LAST 12 MONTHS, WAS THERE A TIME WHEN YOU DID NOT HAVE A STEADY PLACE TO SLEEP OR SLEPT IN A SHELTER (INCLUDING NOW)?: NO

## 2023-03-02 SDOH — ECONOMIC STABILITY: FOOD INSECURITY: WITHIN THE PAST 12 MONTHS, YOU WORRIED THAT YOUR FOOD WOULD RUN OUT BEFORE YOU GOT MONEY TO BUY MORE.: NEVER TRUE

## 2023-03-02 ASSESSMENT — ENCOUNTER SYMPTOMS
CONSTIPATION: 0
EYE PAIN: 0
DIARRHEA: 0
ABDOMINAL PAIN: 0
SHORTNESS OF BREATH: 0
RESPIRATORY NEGATIVE: 1
VOMITING: 0
SHORTNESS OF BREATH: 0
COUGH: 0
NAUSEA: 0
BACK PAIN: 0
ABDOMINAL PAIN: 0
BLOOD IN STOOL: 0
DIARRHEA: 0

## 2023-03-02 ASSESSMENT — LIFESTYLE VARIABLES
HOW MANY STANDARD DRINKS CONTAINING ALCOHOL DO YOU HAVE ON A TYPICAL DAY: 1 OR 2
HOW OFTEN DO YOU HAVE A DRINK CONTAINING ALCOHOL: 2-3 TIMES A WEEK

## 2023-03-02 NOTE — PROGRESS NOTES
[facility]  [address] [place] [address]  Dept: [phone]  Dept Fax: [phone]  Loc: [phone]     Lula Leiva is a 71 y.o. male who presents today for his medical conditions/complaintsas noted below. Lula Leiva is c/o of   Chief Complaint   Patient presents with    Medicare AWV     6 month    Hypertension    Hyperlipidemia    Diabetes         HPI:     HPI    Hemoglobin A1C (%)   Date Value   02/23/2023 7.2 (H)   11/14/2022 6.9 (H)   08/08/2022 7.0 (H)            Microalb/Crt. Ratio (mcg/mg creat)   Date Value   08/25/2022 Can not be calculated     LDL Cholesterol (mg/dL)   Date Value   08/25/2022 48   07/26/2021 36   07/23/2020 52         AST (U/L)   Date Value   08/25/2022 12     ALT (U/L)   Date Value   08/25/2022 14     BUN (mg/dL)   Date Value   08/25/2022 20     BP Readings from Last 3 Encounters:   03/02/23 120/68   03/02/23 120/68   02/02/23 128/74              Past Medical History:   Diagnosis Date    Anemia     minimal anemia, hemoglobin 13.6    Benign prostatic hypertrophy     Cyst in hand     distal phalanx, mid finger left hand    Hyperlipidemia     Hypertension     Keratosis     right forehead    Overweight(278.02)     Seborrhea     Type II or unspecified type diabetes mellitus without mention of complication, not stated as uncontrolled       Past Surgical History:   Procedure Laterality Date    COLONOSCOPY N/A 9/14/2020    diffuse severe diverticulosis, TA, hyperplastic polyp.   at [facility]       Family History   Problem Relation Age of Onset    Diabetes Mother     Cancer Father         throat cancer    Stroke Father     Cancer Brother 64        pancreatic    Cancer Paternal Uncle         lung          Social History     Tobacco Use    Smoking status: Former     Packs/day: 3.00     Years: 20.00     Pack years: 60.00     Types: Cigarettes     Quit date: 12/9/2000     Years since quittin.2    Smokeless tobacco: Never   Substance Use Topics    Alcohol use: Yes     Comment: no alcohol ingestion after a heavier intake eearlier in his life         Current Outpatient Medications   Medication Sig Dispense Refill    lisinopril (PRINIVIL;ZESTRIL) 10 MG tablet TAKE 1 TABLET BY MOUTH EVERY DAY 90 tablet 0    metFORMIN (GLUCOPHAGE-XR) 500 MG extended release tablet TAKE 2 TABLETS BY MOUTH TWO TIMES A  tablet 0    rosuvastatin (CRESTOR) 10 MG tablet TAKE 1 TABLET BY MOUTH EVERY DAY 90 tablet 3    blood glucose test strips (ACCU-CHEK BYRON PLUS) strip TEST TWO TIMES DAILY FOR IRREGULAR BLOOD GLUCOSE 200 strip 3    aspirin 81 MG EC tablet Take 81 mg by mouth daily      Blood Glucose Monitoring Suppl (ONE TOUCH ULTRA 2) by Does not apply route. Tests blood sugar once a day       No current facility-administered medications for this visit.      No Known Allergies    Health Maintenance   Topic Date Due    Pneumococcal 65+ years Vaccine (1 - PCV) Never done    Hepatitis C screen  Never done    Shingles vaccine (1 of 2) Never done    Depression Screen  2023    Diabetic Alb to Cr ratio (uACR) test  2023    Lipids  2023    GFR test (Diabetes, CKD 3-4, OR last GFR 15-59)  2023    Diabetic foot exam  2023    A1C test (Diabetic or Prediabetic)  2024    Annual Wellness Visit (AWV)  2024    Diabetic retinal exam  2024    Colorectal Cancer Screen  2025    DTaP/Tdap/Td vaccine (2 - Td or Tdap) 02/10/2031    Flu vaccine  Completed    COVID-19 Vaccine  Completed    AAA screen  Completed    Hepatitis A vaccine  Aged Out    Hib vaccine  Aged Out    Meningococcal (ACWY) vaccine  Aged Out       Subjective:      Review of Systems    Objective:     Physical Exam   /68 (Site: Left Upper Arm, Position: Sitting, Cuff Size: Large Adult)   Pulse 60   Resp 16   Ht 6' (1.829 m)   Wt 217 lb (98.4 kg)   SpO2 96%   BMI 29.43 kg/m²     Assessment: Diagnosis Orders   1. Medicare annual wellness visit, subsequent                  Plan:       No follow-ups on file. No orders of the defined types were placed in this encounter. No orders of the defined types were placed in this encounter. Patientgiven educational materials - see patient instructions. Discussed use, benefit,and side effects of prescribed medications. All patient questions answered. Ptvoiced understanding. Reviewed health maintenance. Instructed to continue currentmedications, diet and exercise. Patient agreed with treatment plan. Follow up asdirected.      Electronically signed by Monica Og MD on 3/2/2023 at 8:42 AM

## 2023-03-02 NOTE — PATIENT INSTRUCTIONS
Personalized Preventive Plan for Beth Alejandro - 3/2/2023  Medicare offers a range of preventive health benefits. Some of the tests and screenings are paid in full while other may be subject to a deductible, co-insurance, and/or copay. Some of these benefits include a comprehensive review of your medical history including lifestyle, illnesses that may run in your family, and various assessments and screenings as appropriate. After reviewing your medical record and screening and assessments performed today your provider may have ordered immunizations, labs, imaging, and/or referrals for you. A list of these orders (if applicable) as well as your Preventive Care list are included within your After Visit Summary for your review. Other Preventive Recommendations:    A preventive eye exam performed by an eye specialist is recommended every 1-2 years to screen for glaucoma; cataracts, macular degeneration, and other eye disorders. A preventive dental visit is recommended every 6 months. Try to get at least 150 minutes of exercise per week or 10,000 steps per day on a pedometer . Order or download the FREE \"Exercise & Physical Activity: Your Everyday Guide\" from [employer]. Call 0-962.665.5964 or search [employer] online. You need 5901-9651 mg of calcium and 9993-9251 IU of vitamin D per day. It is possible to meet your calcium requirement with diet alone, but a vitamin D supplement is usually necessary to meet this goal.  When exposed to the sun, use a sunscreen that protects against both UVA and UVB radiation with an SPF of 30 or greater. Reapply every 2 to 3 hours or after sweating, drying off with a towel, or swimming. Always wear a seat belt when traveling in a car. Always wear a helmet when riding a bicycle or motorcycle.

## 2023-03-02 NOTE — PROGRESS NOTES
[place] [address]  Dept: [phone]  Dept Fax: [phone]  Loc: [phone]     Bryn Hathaway is a 71 y.o. male who presents today for his medical conditions/complaintsas noted below. Bryn Hathaway is c/o of   Chief Complaint   Patient presents with    Medicare AWV     6 month    Hypertension    Hyperlipidemia    Diabetes         HPI:     Hypertension  This is a chronic problem. The current episode started more than 1 year ago. The problem has been waxing and waning since onset. The problem is controlled. Pertinent negatives include no chest pain, headaches, neck pain, palpitations or shortness of breath. Hyperlipidemia  This is a chronic problem. The current episode started more than 1 year ago. The problem is controlled. Recent lipid tests were reviewed and are variable. Pertinent negatives include no chest pain or shortness of breath. Diabetes  He presents for his follow-up diabetic visit. He has type 2 diabetes mellitus. The initial diagnosis of diabetes was made 11 years ago. His disease course has been fluctuating. Pertinent negatives for hypoglycemia include no confusion, dizziness, headaches, nervousness/anxiousness or pallor. Pertinent negatives for diabetes include no chest pain, no polydipsia, no polyuria and no weakness. Other  This is a recurrent (4-General medical exam,  5- PAD) problem. The current episode started today. The problem occurs intermittently. The problem has been waxing and waning. Pertinent negatives include no abdominal pain, arthralgias, chest pain, chills, coughing, fever, headaches, nausea, neck pain, numbness, rash, vomiting or weakness. Hemoglobin A1C (%)   Date Value   02/23/2023 7.2 (H)   11/14/2022 6.9 (H)   08/08/2022 7.0 (H)            Microalb/Crt.  Ratio (mcg/mg creat)   Date Value   08/25/2022 Can not be calculated     LDL Cholesterol (mg/dL)   Date Value   2022 48   2021 36   2020 52         AST (U/L)   Date Value   2022 12     ALT (U/L)   Date Value   2022 14     BUN (mg/dL)   Date Value   2022 20     BP Readings from Last 3 Encounters:   23 120/68   23 120/68   23 128/74              Past Medical History:   Diagnosis Date    Anemia     minimal anemia, hemoglobin 13.6    Benign prostatic hypertrophy     Cyst in hand     distal phalanx, mid finger left hand    Hyperlipidemia     Hypertension     Keratosis     right forehead    Overweight(278.02)     Seborrhea     Type II or unspecified type diabetes mellitus without mention of complication, not stated as uncontrolled       Past Surgical History:   Procedure Laterality Date    COLONOSCOPY N/A 2020    diffuse severe diverticulosis, TA, hyperplastic polyp.   at [facility], [place]       Family History   Problem Relation Age of Onset    Diabetes Mother     Cancer Father         throat cancer    Stroke Father     Cancer Brother 64        pancreatic    Cancer Paternal Uncle         lung          Social History     Tobacco Use    Smoking status: Former     Packs/day: 3.00     Years: 20.00     Pack years: 60.00     Types: Cigarettes     Quit date: 2000     Years since quittin.2    Smokeless tobacco: Never   Substance Use Topics    Alcohol use: Yes     Comment: no alcohol ingestion after a heavier intake eearlier in his life         Current Outpatient Medications   Medication Sig Dispense Refill    lisinopril (PRINIVIL;ZESTRIL) 10 MG tablet TAKE 1 TABLET BY MOUTH EVERY DAY 90 tablet 0    metFORMIN (GLUCOPHAGE-XR) 500 MG extended release tablet TAKE 2 TABLETS BY MOUTH TWO TIMES A  tablet 0    rosuvastatin (CRESTOR) 10 MG tablet TAKE 1 TABLET BY MOUTH EVERY DAY 90 tablet 3    blood glucose test strips (ACCU-CHEK BYRON PLUS) strip TEST TWO TIMES DAILY FOR IRREGULAR BLOOD GLUCOSE 200 strip 3    aspirin 81 MG EC tablet Take 81 mg by mouth daily Blood Glucose Monitoring Suppl (ONE TOUCH ULTRA 2) by Does not apply route. Tests blood sugar once a day       No current facility-administered medications for this visit. No Known Allergies    Health Maintenance   Topic Date Due    Pneumococcal 65+ years Vaccine (1 - PCV) Never done    Hepatitis C screen  Never done    Shingles vaccine (1 of 2) Never done    Depression Screen  02/25/2023    Diabetic Alb to Cr ratio (uACR) test  08/25/2023    Lipids  08/25/2023    GFR test (Diabetes, CKD 3-4, OR last GFR 15-59)  08/25/2023    Diabetic foot exam  11/17/2023    A1C test (Diabetic or Prediabetic)  02/23/2024    Annual Wellness Visit (AWV)  03/02/2024    Diabetic retinal exam  04/01/2024    Colorectal Cancer Screen  09/14/2025    DTaP/Tdap/Td vaccine (2 - Td or Tdap) 02/10/2031    Flu vaccine  Completed    COVID-19 Vaccine  Completed    AAA screen  Completed    Hepatitis A vaccine  Aged Out    Hib vaccine  Aged Out    Meningococcal (ACWY) vaccine  Aged Out       Subjective:      Review of Systems   Constitutional:  Negative for chills and fever. HENT:  Negative for hearing loss. Eyes:  Negative for pain and visual disturbance. Respiratory:  Negative for cough and shortness of breath. Cardiovascular:  Negative for chest pain, palpitations and leg swelling. Gastrointestinal:  Negative for abdominal pain, blood in stool, constipation, diarrhea, nausea and vomiting. Endocrine: Negative for cold intolerance, polydipsia and polyuria. Genitourinary:  Negative for difficulty urinating, dysuria and hematuria. Musculoskeletal:  Negative for arthralgias, back pain, gait problem and neck pain. Skin:  Negative for pallor and rash. Neurological:  Negative for dizziness, weakness, numbness and headaches. Hematological:  Negative for adenopathy. Does not bruise/bleed easily. Psychiatric/Behavioral:  Negative for confusion. The patient is not nervous/anxious.       Objective:     Physical Exam  Vitals reviewed. Constitutional:       Appearance: He is well-developed. HENT:      Head: Normocephalic and atraumatic. Eyes:      Pupils: Pupils are equal, round, and reactive to light. Cardiovascular:      Rate and Rhythm: Normal rate and regular rhythm. Heart sounds: No murmur heard. No friction rub. No gallop. Pulmonary:      Effort: Pulmonary effort is normal.      Breath sounds: Normal breath sounds. No wheezing or rales. Abdominal:      General: There is no distension. Palpations: Abdomen is soft. There is no mass. Tenderness: There is no abdominal tenderness. There is no rebound. Musculoskeletal:         General: Normal range of motion. Cervical back: Neck supple. Lymphadenopathy:      Cervical: No cervical adenopathy. Skin:     General: Skin is warm and dry. Findings: No rash. Neurological:      Mental Status: He is alert and oriented to person, place, and time. Cranial Nerves: No cranial nerve deficit (grossly). Psychiatric:         Thought Content: Thought content normal.      /68 (Site: Left Upper Arm, Position: Sitting, Cuff Size: Large Adult)   Pulse 60   Resp 16   Ht 6' (1.829 m)   Wt 217 lb (98.4 kg)   SpO2 96%   BMI 29.43 kg/m²     Assessment:       Diagnosis Orders   1. General medical exam        2. Medicare annual wellness visit, subsequent        3. Other hyperlipidemia  Lipid Panel      4. PAD (peripheral artery disease) ([facility] [address]        5. Type 2 diabetes mellitus without complication, without long-term current use of insulin (HCC)  Hemoglobin A1C    Microalbumin, Ur      6. Primary hypertension  Comprehensive Metabolic Panel      7. Special screening for malignant neoplasm of prostate  PSA Screening      8. Iron deficiency anemia, unspecified iron deficiency anemia type  Hemoglobin    Iron and TIBC                Plan:       Return in about 6 months (around 9/8/2023) for Hypertension, Hyperlipidemia, Diabetes.     Orders Placed This Encounter   Procedures    Comprehensive Metabolic Panel     Standing Status:   Future     Standing Expiration Date:   3/2/2024    Lipid Panel     Standing Status:   Future     Standing Expiration Date:   3/2/2024     Order Specific Question:   Is Patient Fasting?/# of Hours     Answer:   12    Hemoglobin A1C     Standing Status:   Future     Standing Expiration Date:   3/2/2024    PSA Screening     Standing Status:   Future     Standing Expiration Date:   3/2/2024    Microalbumin, Ur     Standing Status:   Future     Standing Expiration Date:   3/2/2024    Hemoglobin     Standing Status:   Future     Standing Expiration Date:   3/2/2024    Iron and TIBC     Standing Status:   Future     Standing Expiration Date:   3/2/2024     Order Specific Question:   Is Patient Fasting? Answer:   na     Order Specific Question:   No of Hours? Answer:   na       No orders of the defined types were placed in this encounter. Patientgiven educational materials - see patient instructions. Discussed use, benefit,and side effects of prescribed medications. All patient questions answered. Ptvoiced understanding. Reviewed health maintenance. Instructed to continue currentmedications, diet and exercise. Patient agreed with treatment plan. Follow up asdirected.      Electronically signed by Monica Og MD on 3/2/2023 at 8:46 AM

## 2023-03-02 NOTE — PROGRESS NOTES
Medicare Annual Wellness Visit    Marquis Fournier is here for Medicare AWV (6 month), Hypertension, Hyperlipidemia, and Diabetes    Assessment & Plan    Recommendations for Preventive Services Due: see orders and patient instructions/AVS.  Recommended screening schedule for the next 5-10 years is provided to the patient in written form: see Patient Instructions/AVS.     Return in about 6 months (around 9/8/2023) for Hypertension, Hyperlipidemia, Diabetes. Subjective       Patient's complete Health Risk Assessment and screening values have been reviewed and are found in Flowsheets. The following problems were reviewed today and where indicated follow up appointments were made and/or referrals ordered. Positive Risk Factor Screenings with Interventions:                                       Objective   Vitals:    03/02/23 0814   BP: 120/68   Site: Left Upper Arm   Position: Sitting   Cuff Size: Large Adult   Pulse: 60   Resp: 16   SpO2: 96%   Weight: 217 lb (98.4 kg)   Height: 6' (1.829 m)      Body mass index is 29.43 kg/m². No Known Allergies  Prior to Visit Medications    Medication Sig Taking? Authorizing Provider   lisinopril (PRINIVIL;ZESTRIL) 10 MG tablet TAKE 1 TABLET BY MOUTH EVERY DAY  Sherry Youngblood MD   metFORMIN (GLUCOPHAGE-XR) 500 MG extended release tablet TAKE 2 TABLETS BY MOUTH TWO TIMES A DAY  Sherry Youngblood MD   rosuvastatin (CRESTOR) 10 MG tablet TAKE 1 TABLET BY MOUTH EVERY DAY  Sherry Youngblood MD   blood glucose test strips (ACCU-CHEK BYRON PLUS) strip TEST TWO TIMES DAILY FOR IRREGULAR BLOOD GLUCOSE  Crystal Nikki Res, APRN - CNP   aspirin 81 MG EC tablet Take 81 mg by mouth daily  Historical Provider, MD   Blood Glucose Monitoring Suppl (ONE TOUCH ULTRA 2) by Does not apply route.  Tests blood sugar once a day  Historical Provider, MD [place] (Including outside providers/suppliers regularly involved in providing care):   Patient Care Team:  Sherry Youngblood MD as PCP - General (Internal Medicine)  Nanette Og MD as PCP - Empaneled Provider  [address] [place] [address], AXEL - CNP as Nurse Practitioner (Nurse Practitioner)     Reviewed and updated this visit:  Tobacco  Allergies  Meds  Problems  Med Hx  Surg Hx  Soc Hx  Fam Hx                 Nanette Og MD   I reviewed multiple test results for this appointment. 2/23/2023 okay A1c at 7.2    2/23/2023 normal hemoglobin 13.2    2/23/2023 normal iron at 69    Patient told to continue metformin.

## 2023-03-02 NOTE — PROGRESS NOTES
[address], [address]  [facility] [id number] [phone]        HISTORY:    Mukesh Conroy presents today for evaluation and management of:  Chief Complaint   Patient presents with    Diabetes    3 Month Follow-Up       Patient Care Team:  Clifford Molina MD as PCP - General (Internal Medicine)  Clifford Molina MD as PCP - [facility]  [address] [place] [address], [facility] - CNP as Nurse Practitioner (Nurse Practitioner)      Interval History:    Past DM Medications   Glimepiride-therapy completed  Starlix-therapy completed  Ozempic-cost     Current Diabetic Medications  Metformin 1000 mg bid     DKA episodes: 0    11/17/22   Mukesh Conroy is a 71 y.o. male patient who presents to clinic today for his diabetes. he has a history of HTN, hyperlipidemia, PAD and obesity  which contributes to his diabetes. At previous visit diabetes counseling was provided. he denies any current signs or symptoms of hyper/hypoglycemia. he states they are taking their medications as prescribed without any adverse effects. Diet: low carb  Exercise: none  BS testing: fasting range: 120-140, patient tests 1 time(s) per day  Hypoglycemia: No  Hypoglycemia as needed treatment: german  Issues:   Diabetic foot exam up-to-date: Yes  Diabetic retinal exam up-to-date: Yes     Diabetes complications:none    79/90/47   Mukesh Conroy is a 71 y.o. male patient who presents to clinic today for his diabetes. he has a history of HTN, hyperlipidemia, PAD and obesity which contributes to his diabetes. At previous visit diabetes counseling was provided. he denies any current signs or symptoms of hyper/hypoglycemia. he states they are taking their medications as prescribed without any adverse effects.    Diet: regular  Exercise: none  BS testing: fasting range: 130-150, patient tests 1 time(s) per day  Hypoglycemia: No  Hypoglycemia as needed treatment: snack  Issues:   Diabetic foot exam up-to-date: Yes  Diabetic retinal exam up-to-date: Yes    Diabetes complications:none         High cholesterol-  Takes crestor and denies any adverse effects with its use. Watches diet and exercise. Hypertension-  Takes lisinopril and denies any adverse effects with their use. Watches diet and exercise. Denies any chest pain, dizziness or edema.              Past Medical History:   Diagnosis Date    Anemia     minimal anemia, hemoglobin 13.6    Benign prostatic hypertrophy     Cyst in hand     distal phalanx, mid finger left hand    Hyperlipidemia     Hypertension     Keratosis     right forehead    Overweight(278.02)     Seborrhea     Type II or unspecified type diabetes mellitus without mention of complication, not stated as uncontrolled      Family History   Problem Relation Age of Onset    Diabetes Mother     Cancer Father         throat cancer    Stroke Father     Cancer Brother 64        pancreatic    Cancer Paternal Uncle         lung     Social History     Tobacco Use    Smoking status: Former     Packs/day: 3.00     Years: 20.00     Pack years: 60.00     Types: Cigarettes     Quit date: 2000     Years since quittin.2    Smokeless tobacco: Never   Vaping Use    Vaping Use: Never used   Substance Use Topics    Alcohol use: Yes     Comment: no alcohol ingestion after a heavier intake eearlier in his life    Drug use: No     No Known Allergies    MEDICATIONS:  Current Outpatient Medications   Medication Sig Dispense Refill    lisinopril (PRINIVIL;ZESTRIL) 10 MG tablet TAKE 1 TABLET BY MOUTH EVERY DAY 90 tablet 0    metFORMIN (GLUCOPHAGE-XR) 500 MG extended release tablet TAKE 2 TABLETS BY MOUTH TWO TIMES A  tablet 0    rosuvastatin (CRESTOR) 10 MG tablet TAKE 1 TABLET BY MOUTH EVERY DAY 90 tablet 3    aspirin 81 MG EC tablet Take 81 mg by mouth daily      blood glucose test strips (ACCU-CHEK BYRON PLUS) strip TEST TWO TIMES DAILY FOR IRREGULAR BLOOD GLUCOSE 200 strip 3    Blood Glucose Monitoring Suppl (ONE TOUCH ULTRA 2) by Does not apply route. Tests blood sugar once a day       No current facility-administered medications for this visit. Review ofSymptoms:  Review of Systems   Constitutional:  Positive for fatigue. Negative for unexpected weight change. Eyes:  Negative for visual disturbance. Respiratory: Negative. Negative for shortness of breath. Cardiovascular:  Negative for chest pain and leg swelling. Gastrointestinal:  Negative for abdominal pain and diarrhea. Endocrine: Negative for polydipsia, polyphagia and polyuria. Genitourinary: Negative. Musculoskeletal: Negative. Skin:  Negative for rash and wound. Neurological:  Negative for dizziness, tremors, seizures and headaches. Psychiatric/Behavioral: Negative. Negative for confusion and decreased concentration. Theremainder of a complete 14-point review of systems is negative. Vital Signs: /68   Pulse 60   Resp 18   Ht 6' (1.829 m)   Wt 217 lb (98.4 kg)   BMI 29.43 kg/m²      Wt Readings from Last 3 Encounters:   03/02/23 217 lb (98.4 kg)   02/02/23 218 lb 9.6 oz (99.2 kg)   12/16/22 214 lb (97.1 kg)     Body mass index is 29.43 kg/m².   LABS:  Hemoglobin A1C   Date Value Ref Range Status   02/23/2023 7.2 (H) 4.0 - 6.0 % Final   11/14/2022 6.9 (H) 4.0 - 6.0 % Final     Lab Results   Component Value Date    LABMICR Can not be calculated 08/25/2022     Lab Results   Component Value Date     08/25/2022    K 4.8 08/25/2022     08/25/2022    CO2 28 08/25/2022    BUN 20 08/25/2022    CREATININE 0.86 08/25/2022    GLUCOSE 149 (H) 08/25/2022    CALCIUM 9.2 08/25/2022    PROT 6.6 08/25/2022    LABALBU 4.3 08/25/2022    BILITOT 0.40 08/25/2022    ALKPHOS 68 08/25/2022    AST 12 08/25/2022    ALT 14 08/25/2022    LABGLOM >60 08/25/2022    GFRAA >60 08/25/2022     Lab Results   Component Value Date    CHOL 95 08/25/2022    CHOL 93 07/26/2021 CHOL 99 07/23/2020     Lab Results   Component Value Date    TRIG 64 08/25/2022    TRIG 93 07/26/2021    TRIG 91 07/23/2020     Lab Results   Component Value Date    HDL 34 (L) 08/25/2022    HDL 38 (L) 07/26/2021    HDL 29 (L) 07/23/2020     Lab Results   Component Value Date    LDLCHOLESTEROL 48 08/25/2022    LDLCHOLESTEROL 36 07/26/2021    LDLCHOLESTEROL 52 07/23/2020     Lab Results   Component Value Date    VLDL NOT REPORTED 07/26/2021    VLDL NOT REPORTED 07/23/2020    VLDL NOT REPORTED 07/16/2019     Lab Results   Component Value Date    CHOLHDLRATIO 2.8 08/25/2022    CHOLHDLRATIO 2.4 07/26/2021    CHOLHDLRATIO 3.4 07/23/2020           Physical Exam  Constitutional:       Appearance: Normal appearance. He is well-developed. HENT:      Head: Normocephalic. Eyes:      Pupils: Pupils are equal, round, and reactive to light. Cardiovascular:      Rate and Rhythm: Normal rate and regular rhythm. Pulmonary:      Effort: Pulmonary effort is normal.      Breath sounds: Normal breath sounds. Musculoskeletal:         General: Normal range of motion. Cervical back: Normal range of motion. Skin:     General: Skin is warm and dry. Findings: No lesion (no lipohypertrophy) or rash. Neurological:      Mental Status: He is alert and oriented to person, place, and time. Mental status is at baseline. Sensory: No sensory deficit. Psychiatric:         Mood and Affect: Mood normal.         Speech: Speech normal.         Behavior: Behavior normal.         Thought Content: Thought content normal.         Judgment: Judgment normal.           ASSESSMENT/PLAN:     Diagnosis Orders   1. Type 2 diabetes mellitus without complication, without long-term current use of insulin (HCC)  Hemoglobin A1C      2. Other hyperlipidemia        3. Essential hypertension          Orders Placed This Encounter   Procedures    Hemoglobin A1C     No orders of the defined types were placed in this encounter.     Requested Prescriptions      No prescriptions requested or ordered in this encounter       1. Type 2 diabetes mellitus without complication, without long-term current use of insulin (HCC)  - Unstable  HbA1C goal is less than 7%. - Fasting blood glucose goal is 70-120mg/dl and postprandial blood sugar goal is less than 180 mg/dl. - Labs reviewed including most recent A1c, UACR and kidney function. Repeat labs due in 3 months.    -We discussed in great detail dietary modifications they can make to better improve their blood sugars. -follow up diabetes education completed, all questions answered.  -will work on lifestyle and consider sglt2 if not at goal. May need patient assistance. Discussed signs and symptoms of hyper/hypoglycemia and how to treat. Encouraged 150 minutes of physical activity per week. Follow a low carbohydrate diet. Encouraged at least 7 hours of sleep. The patient was informed of the goals of diabetes management. This can only be accomplished by watching their diet and exercise levels. We certainly use medicines to help attain these goals. The consequences of not controlling blood sugars were discussed. These include blindness, heart disease, stroke, kidney disease, and possibly need for dialysis. They were told to be careful with their foot care as diabetics often have nerve damage, infections and risk for limb amutations . They also need a dilated eye exam yearly. We discussed the issues of diet, exercise, medication, complication avoidance, reviewed the signs and symptoms of diabetes, hypoglycemic episodes, significance of HbA1C.     - Hemoglobin A1C; Future    2. Other hyperlipidemia  stable, lipid panel reviewed, continue current medications. Diet and exercise. 3. Essential hypertension   stable, continue current medications. Diet and exercise Seek emergent care if chest pain develops. Answered all patient questions.  Agrees to follow plan of care and to follow up in 6 months, sooner if needed. Call office if unexplained blood sugars less than 70 occur or above 400. Call office or access [employer] with any further questions or concerns. Be sure to bring glucometer/food log at next appointment. Total time spent reviewing chart, labs, counseling patient and documenting on the date of the encounter: 30 min.       Electronically signed by AXEL Harmon CNP on 3/2/2023 at 8:28 AM      (Please note that portions of this note were completed with a voice-recognition program. Efforts were made to edit the dictation but occasionally words are mis-transcribed.)

## 2023-05-12 DIAGNOSIS — I10 ESSENTIAL HYPERTENSION: ICD-10-CM

## 2023-05-12 DIAGNOSIS — E11.9 TYPE 2 DIABETES MELLITUS WITHOUT COMPLICATION, WITHOUT LONG-TERM CURRENT USE OF INSULIN (HCC): ICD-10-CM

## 2023-05-12 RX ORDER — LISINOPRIL 10 MG/1
TABLET ORAL
Qty: 90 TABLET | Refills: 0 | Status: SHIPPED | OUTPATIENT
Start: 2023-05-12

## 2023-05-12 RX ORDER — METFORMIN HYDROCHLORIDE 500 MG/1
TABLET, EXTENDED RELEASE ORAL
Qty: 360 TABLET | Refills: 0 | Status: SHIPPED | OUTPATIENT
Start: 2023-05-12

## 2023-05-12 NOTE — TELEPHONE ENCOUNTER
Arleth Mcwilliams called requesting a refill of the below medication which has been pended for you:     Requested Prescriptions     Pending Prescriptions Disp Refills    metFORMIN (GLUCOPHAGE-XR) 500 MG extended release tablet [Pharmacy Med Name: metFORMIN HCl ER Oral Tablet Extended Release 24 Hour 500 MG] 360 tablet 0     Sig: TAKE 2 TABLETS BY MOUTH 2 TIMES A DAY    lisinopril (PRINIVIL;ZESTRIL) 10 MG tablet [Pharmacy Med Name: Lisinopril Oral Tablet 10 MG] 90 tablet 0     Sig: TAKE 1 TABLET BY MOUTH EVERY DAY       Last Appointment Date: 3/2/2023  Next Appointment Date: 9/7/2023    No Known Allergies

## 2023-06-01 ENCOUNTER — HOSPITAL ENCOUNTER (OUTPATIENT)
Age: 73
Discharge: HOME | End: 2023-06-01
Payer: MEDICARE

## 2023-06-01 DIAGNOSIS — E11.9 TYPE 2 DIABETES MELLITUS WITHOUT COMPLICATION, WITHOUT LONG-TERM CURRENT USE OF INSULIN (HCC): ICD-10-CM

## 2023-06-01 LAB
EST. AVERAGE GLUCOSE BLD GHB EST-MCNC: 157 MG/DL
HBA1C MFR BLD: 7.1 % (ref 4–6)

## 2023-06-01 PROCEDURE — 36415 COLL VENOUS BLD VENIPUNCTURE: CPT

## 2023-06-01 PROCEDURE — 83036 HEMOGLOBIN GLYCOSYLATED A1C: CPT

## 2023-06-08 ENCOUNTER — OFFICE VISIT (OUTPATIENT)
Dept: DIABETES SERVICES | Age: 73
End: 2023-06-08
Payer: MEDICARE

## 2023-06-08 VITALS
RESPIRATION RATE: 18 BRPM | WEIGHT: 218 LBS | BODY MASS INDEX: 29.53 KG/M2 | SYSTOLIC BLOOD PRESSURE: 128 MMHG | HEART RATE: 64 BPM | DIASTOLIC BLOOD PRESSURE: 82 MMHG | HEIGHT: 72 IN

## 2023-06-08 DIAGNOSIS — E78.49 OTHER HYPERLIPIDEMIA: ICD-10-CM

## 2023-06-08 DIAGNOSIS — I10 ESSENTIAL HYPERTENSION: ICD-10-CM

## 2023-06-08 DIAGNOSIS — E11.9 TYPE 2 DIABETES MELLITUS WITHOUT COMPLICATION, WITHOUT LONG-TERM CURRENT USE OF INSULIN (HCC): Primary | ICD-10-CM

## 2023-06-08 PROCEDURE — G8427 DOCREV CUR MEDS BY ELIG CLIN: HCPCS | Performed by: NURSE PRACTITIONER

## 2023-06-08 PROCEDURE — 99213 OFFICE O/P EST LOW 20 MIN: CPT | Performed by: NURSE PRACTITIONER

## 2023-06-08 PROCEDURE — 1036F TOBACCO NON-USER: CPT | Performed by: NURSE PRACTITIONER

## 2023-06-08 PROCEDURE — 3074F SYST BP LT 130 MM HG: CPT | Performed by: NURSE PRACTITIONER

## 2023-06-08 PROCEDURE — 3051F HG A1C>EQUAL 7.0%<8.0%: CPT | Performed by: NURSE PRACTITIONER

## 2023-06-08 PROCEDURE — G8417 CALC BMI ABV UP PARAM F/U: HCPCS | Performed by: NURSE PRACTITIONER

## 2023-06-08 PROCEDURE — 99214 OFFICE O/P EST MOD 30 MIN: CPT | Performed by: NURSE PRACTITIONER

## 2023-06-08 PROCEDURE — 1123F ACP DISCUSS/DSCN MKR DOCD: CPT | Performed by: NURSE PRACTITIONER

## 2023-06-08 PROCEDURE — 3079F DIAST BP 80-89 MM HG: CPT | Performed by: NURSE PRACTITIONER

## 2023-06-08 PROCEDURE — 3017F COLORECTAL CA SCREEN DOC REV: CPT | Performed by: NURSE PRACTITIONER

## 2023-06-08 PROCEDURE — 2022F DILAT RTA XM EVC RTNOPTHY: CPT | Performed by: NURSE PRACTITIONER

## 2023-06-08 ASSESSMENT — ENCOUNTER SYMPTOMS
SHORTNESS OF BREATH: 0
ABDOMINAL PAIN: 0
RESPIRATORY NEGATIVE: 1
DIARRHEA: 0

## 2023-06-08 NOTE — PATIENT INSTRUCTIONS
Call to check on price of Farxiga or Jardiance---- ozempic, trulicity or mounjaro rybelsus     Work on smaller portions and cutting down on bread

## 2023-06-08 NOTE — PROGRESS NOTES
[address], [address]  [facility] [id number] [phone]        HISTORY:    Jessie Torres presents today for evaluation and management of:  Chief Complaint   Patient presents with    Diabetes    3 Month Follow-Up       Patient Care Team:  Avni Conrad MD as PCP - General (Internal Medicine)  Avni Conrad [facility] P[facility] - [facility]  [address] [place] [address], [facility] - CNP as Nurse Practitioner (Nurse Practitioner)      Interval History:    Past DM Medications   Glimepiride-therapy completed  Starlix-therapy completed  Ozempic-cost     Current Diabetic Medications  Metformin 1000 mg bid     DKA episodes: 0    03/02/23   Jessie Torres is a 71 y.o. male patient who presents to clinic today for his diabetes. he has a history of HTN, hyperlipidemia, PAD and obesity which contributes to his diabetes. At previous visit diabetes counseling was provided. he denies any current signs or symptoms of hyper/hypoglycemia. he states they are taking their medications as prescribed without any adverse effects. Diet: regular  Exercise: none  BS testing: fasting range: 130-150, patient tests 1 time(s) per day  Hypoglycemia: No  Hypoglycemia as needed treatment: snack  Issues:   Diabetic foot exam up-to-date: Yes  Diabetic retinal exam up-to-date: Yes     Diabetes complications:none    82/35/08   Jessie Torres is a 71 y.o. male patient who presents to clinic today for his diabetes. he has a history of HTN, hyperlipidemia, PAD and obesity which contributes to his diabetes. At previous visit diabetes counseling was provided. he denies any current signs or symptoms of hyper/hypoglycemia. he states they are taking their medications as prescribed without any adverse effects.    Diet: regular  Exercise: none  BS testing: fasting range: 150-170, patient tests 1 time(s) per day  Hypoglycemia: No  Hypoglycemia as needed treatment:

## 2023-06-22 ENCOUNTER — OFFICE VISIT (OUTPATIENT)
Dept: PODIATRY | Age: 73
End: 2023-06-22
Payer: MEDICARE

## 2023-06-22 VITALS
HEART RATE: 72 BPM | SYSTOLIC BLOOD PRESSURE: 126 MMHG | DIASTOLIC BLOOD PRESSURE: 70 MMHG | RESPIRATION RATE: 20 BRPM | BODY MASS INDEX: 29.62 KG/M2 | WEIGHT: 218.4 LBS

## 2023-06-22 DIAGNOSIS — L84 CORNS AND CALLOSITIES: ICD-10-CM

## 2023-06-22 DIAGNOSIS — E11.42 DM TYPE 2 WITH DIABETIC PERIPHERAL NEUROPATHY (HCC): Primary | ICD-10-CM

## 2023-06-22 PROCEDURE — 11056 PARNG/CUTG B9 HYPRKR LES 2-4: CPT | Performed by: PODIATRIST

## 2023-06-22 NOTE — PROGRESS NOTES
Foot Care Worksheet  PCP: Merle Domingo MD  Last visit: 03 / 02 / 2023    Nail description: Thick , Yellow , Crumbly , Marked limitation of ambulation     Pain resulting from thickened and dystrophy of nail plate No    Nails involved  Right     (T5-T9)  Left       (TA-T4)    Q7 1 Class A Finding - Non traumatic amputation of foot No    Q8 2 Class B Findings - Absent DP pulse No, Absent PT pulse No, Advanced tropic changes (3 required) Yes    Decrease hair growth Yes, Nail changes/thickening Yes, Pigmented changes/discoloration Yes, Skin texture (thin, shiny) No, Skin color (rubor/redness) No    Q9 1 Class B and 2 Class C Findings  Claudication No, Temperature change Yes, Paresthesia Yes, Burning No, Edema Yes    Subjective:  Patient presents to [facility] today for routine diabetic foot care. Patient's diabetic control has been not changed. No Known Allergies    Past Medical History:   Diagnosis Date    Anemia     minimal anemia, hemoglobin 13.6    Benign prostatic hypertrophy     Cyst in hand     distal phalanx, mid finger left hand    Hyperlipidemia     Hypertension     Keratosis     right forehead    Overweight(278.02)     Seborrhea     Type II or unspecified type diabetes mellitus without mention of complication, not stated as uncontrolled        Prior to Admission medications    Medication Sig Start Date End Date Taking?  Authorizing Provider   metFORMIN (GLUCOPHAGE-XR) 500 MG extended release tablet TAKE 2 TABLETS BY MOUTH 2 TIMES A DAY 5/12/23  Yes Merle Domingo MD   lisinopril (PRINIVIL;ZESTRIL) 10 MG tablet TAKE 1 TABLET BY MOUTH EVERY DAY 5/12/23  Yes Merle Domingo MD   rosuvastatin (CRESTOR) 10 MG tablet TAKE 1 TABLET BY MOUTH EVERY DAY 8/15/22  Yes Merle Domingo MD   blood glucose test strips (ACCU-CHEK BYRON PLUS) strip TEST TWO TIMES DAILY FOR IRREGULAR BLOOD GLUCOSE 8/11/22  Yes Keke Silverio APRN - CNP   aspirin 81 MG EC tablet Take 1 tablet by mouth daily   Yes

## 2023-08-10 DIAGNOSIS — E11.9 TYPE 2 DIABETES MELLITUS WITHOUT COMPLICATION, WITHOUT LONG-TERM CURRENT USE OF INSULIN (HCC): ICD-10-CM

## 2023-08-10 DIAGNOSIS — E78.49 OTHER HYPERLIPIDEMIA: ICD-10-CM

## 2023-08-10 DIAGNOSIS — I10 ESSENTIAL HYPERTENSION: ICD-10-CM

## 2023-08-10 RX ORDER — ROSUVASTATIN CALCIUM 10 MG/1
TABLET, COATED ORAL
Qty: 90 TABLET | Refills: 0 | Status: SHIPPED | OUTPATIENT
Start: 2023-08-10

## 2023-08-10 RX ORDER — METFORMIN HYDROCHLORIDE 500 MG/1
TABLET, EXTENDED RELEASE ORAL
Qty: 360 TABLET | Refills: 0 | Status: SHIPPED | OUTPATIENT
Start: 2023-08-10

## 2023-08-10 RX ORDER — LISINOPRIL 10 MG/1
TABLET ORAL
Qty: 90 TABLET | Refills: 0 | Status: SHIPPED | OUTPATIENT
Start: 2023-08-10

## 2023-08-10 NOTE — TELEPHONE ENCOUNTER
Corinne Petty called requesting a refill of the below medication which has been pended for you:     Requested Prescriptions     Pending Prescriptions Disp Refills    rosuvastatin (CRESTOR) 10 MG tablet [Pharmacy Med Name: Rosuvastatin Calcium Oral Tablet 10 MG] 90 tablet 0     Sig: TAKE 1 TABLET BY MOUTH EVERY DAY    lisinopril (PRINIVIL;ZESTRIL) 10 MG tablet [Pharmacy Med Name: Lisinopril Oral Tablet 10 MG] 90 tablet 0     Sig: TAKE 1 TABLET BY MOUTH EVERY DAY    metFORMIN (GLUCOPHAGE-XR) 500 MG extended release tablet [Pharmacy Med Name: metFORMIN HCl ER Oral Tablet Extended Release 24 Hour 500 MG] 360 tablet 0     Sig: TAKE 2 TABLETS BY MOUTH 2 TIMES A DAY       Last Appointment Date: 3/2/2023  Next Appointment Date: 9/7/2023    No Known Allergies

## 2023-08-27 DIAGNOSIS — E11.9 TYPE 2 DIABETES MELLITUS WITHOUT COMPLICATION, WITHOUT LONG-TERM CURRENT USE OF INSULIN (HCC): ICD-10-CM

## 2023-08-28 RX ORDER — BLOOD SUGAR DIAGNOSTIC
STRIP MISCELLANEOUS
Qty: 200 EACH | Refills: 3 | Status: SHIPPED | OUTPATIENT
Start: 2023-08-28

## 2023-08-31 ENCOUNTER — OFFICE VISIT (OUTPATIENT)
Dept: PODIATRY | Age: 73
End: 2023-08-31
Payer: MEDICARE

## 2023-08-31 ENCOUNTER — HOSPITAL ENCOUNTER (OUTPATIENT)
Age: 73
Discharge: HOME | End: 2023-08-31
Payer: MEDICARE

## 2023-08-31 VITALS
RESPIRATION RATE: 20 BRPM | WEIGHT: 219.6 LBS | SYSTOLIC BLOOD PRESSURE: 132 MMHG | HEART RATE: 72 BPM | BODY MASS INDEX: 29.78 KG/M2 | DIASTOLIC BLOOD PRESSURE: 70 MMHG

## 2023-08-31 DIAGNOSIS — I10 PRIMARY HYPERTENSION: ICD-10-CM

## 2023-08-31 DIAGNOSIS — L84 CORNS AND CALLOSITIES: ICD-10-CM

## 2023-08-31 DIAGNOSIS — E78.49 OTHER HYPERLIPIDEMIA: ICD-10-CM

## 2023-08-31 DIAGNOSIS — E11.42 DM TYPE 2 WITH DIABETIC PERIPHERAL NEUROPATHY (HCC): Primary | ICD-10-CM

## 2023-08-31 DIAGNOSIS — D50.9 IRON DEFICIENCY ANEMIA, UNSPECIFIED IRON DEFICIENCY ANEMIA TYPE: ICD-10-CM

## 2023-08-31 DIAGNOSIS — Z12.5 SPECIAL SCREENING FOR MALIGNANT NEOPLASM OF PROSTATE: ICD-10-CM

## 2023-08-31 DIAGNOSIS — E11.9 TYPE 2 DIABETES MELLITUS WITHOUT COMPLICATION, WITHOUT LONG-TERM CURRENT USE OF INSULIN (HCC): ICD-10-CM

## 2023-08-31 LAB
ALBUMIN SERPL-MCNC: 4.5 G/DL (ref 3.5–5.2)
ALBUMIN/GLOB SERPL: 2.1 {RATIO} (ref 1–2.5)
ALP SERPL-CCNC: 52 U/L (ref 40–129)
ALT SERPL-CCNC: 19 U/L (ref 5–41)
ANION GAP SERPL CALCULATED.3IONS-SCNC: 8 MMOL/L (ref 9–17)
AST SERPL-CCNC: 17 U/L
BILIRUB SERPL-MCNC: 0.7 MG/DL (ref 0.3–1.2)
BUN SERPL-MCNC: 16 MG/DL (ref 8–23)
BUN/CREAT SERPL: 16 (ref 9–20)
CALCIUM SERPL-MCNC: 9.3 MG/DL (ref 8.6–10.4)
CHLORIDE SERPL-SCNC: 102 MMOL/L (ref 98–107)
CHOLEST SERPL-MCNC: 100 MG/DL
CHOLESTEROL/HDL RATIO: 3.3
CO2 SERPL-SCNC: 28 MMOL/L (ref 20–31)
CREAT SERPL-MCNC: 1 MG/DL (ref 0.7–1.2)
CREAT UR-MCNC: 54.5 MG/DL (ref 39–259)
EST. AVERAGE GLUCOSE BLD GHB EST-MCNC: 160 MG/DL
GFR SERPL CREATININE-BSD FRML MDRD: >60 ML/MIN/1.73M2
GLUCOSE SERPL-MCNC: 147 MG/DL (ref 70–99)
HBA1C MFR BLD: 7.2 % (ref 4–6)
HDLC SERPL-MCNC: 30 MG/DL
HGB BLD-MCNC: 13.6 G/DL (ref 13–17)
IRON SATN MFR SERPL: 36 % (ref 20–55)
IRON SERPL-MCNC: 101 UG/DL (ref 59–158)
LDLC SERPL CALC-MCNC: 52 MG/DL (ref 0–130)
MICROALBUMIN UR-MCNC: <12 MG/L
MICROALBUMIN/CREAT UR-RTO: NORMAL MCG/MG CREAT
POTASSIUM SERPL-SCNC: 4.5 MMOL/L (ref 3.7–5.3)
PROT SERPL-MCNC: 6.6 G/DL (ref 6.4–8.3)
PSA SERPL-MCNC: 1.08 NG/ML
SODIUM SERPL-SCNC: 138 MMOL/L (ref 135–144)
TIBC SERPL-MCNC: 283 UG/DL (ref 250–450)
TRIGL SERPL-MCNC: 89 MG/DL
UNSATURATED IRON BINDING CAPACITY: 182 UG/DL (ref 112–347)

## 2023-08-31 PROCEDURE — 83550 IRON BINDING TEST: CPT

## 2023-08-31 PROCEDURE — 80053 COMPREHEN METABOLIC PANEL: CPT

## 2023-08-31 PROCEDURE — 83036 HEMOGLOBIN GLYCOSYLATED A1C: CPT

## 2023-08-31 PROCEDURE — 83540 ASSAY OF IRON: CPT

## 2023-08-31 PROCEDURE — G0103 PSA SCREENING: HCPCS

## 2023-08-31 PROCEDURE — 82570 ASSAY OF URINE CREATININE: CPT

## 2023-08-31 PROCEDURE — 82043 UR ALBUMIN QUANTITATIVE: CPT

## 2023-08-31 PROCEDURE — 11056 PARNG/CUTG B9 HYPRKR LES 2-4: CPT | Performed by: PODIATRIST

## 2023-08-31 PROCEDURE — 36415 COLL VENOUS BLD VENIPUNCTURE: CPT

## 2023-08-31 PROCEDURE — 85018 HEMOGLOBIN: CPT

## 2023-08-31 PROCEDURE — 80061 LIPID PANEL: CPT

## 2023-08-31 NOTE — PROGRESS NOTES
Foot Care Worksheet  PCP: Nate Gan MD  Last visit: 03 / 02 / 2023    Nail description: Thick , Yellow , Crumbly , Marked limitation of ambulation     Pain resulting from thickened and dystrophy of nail plate No    Nails involved  Right     (T5-T9)  Left       (TA-T4)    Q7 1 Class A Finding - Non traumatic amputation of foot No    Q8 2 Class B Findings - Absent DP pulse No, Absent PT pulse No, Advanced tropic changes (3 required) Yes    Decrease hair growth Yes, Nail changes/thickening Yes, Pigmented changes/discoloration Yes, Skin texture (thin, shiny) No, Skin color (rubor/redness) No    Q9 1 Class B and 2 Class C Findings  Claudication No, Temperature change Yes, Paresthesia Yes, Burning No, Edema Yes    Subjective:  Patient presents to [facility] today for routine diabetic foot care. Patient's diabetic control has been not changed. No Known Allergies    Past Medical History:   Diagnosis Date    Anemia     minimal anemia, hemoglobin 13.6    Benign prostatic hypertrophy     Cyst in hand     distal phalanx, mid finger left hand    Hyperlipidemia     Hypertension     Keratosis     right forehead    Overweight(278.02)     Seborrhea     Type II or unspecified type diabetes mellitus without mention of complication, not stated as uncontrolled        Prior to Admission medications    Medication Sig Start Date End Date Taking?  Authorizing Provider   ACCU-CHEK BYRON PLUS strip TEST TWO TIMES DAILY FOR IRREGULAR BLOOD GLUCOSE 8/28/23  Yes Keke Oden APRN - CNP   rosuvastatin (CRESTOR) 10 MG tablet TAKE 1 TABLET BY MOUTH EVERY DAY 8/10/23  Yes Nate Gan MD   lisinopril (PRINIVIL;ZESTRIL) 10 MG tablet TAKE 1 TABLET BY MOUTH EVERY DAY 8/10/23  Yes Nate Gan MD   metFORMIN (GLUCOPHAGE-XR) 500 MG extended release tablet TAKE 2 TABLETS BY MOUTH 2 TIMES A DAY 8/10/23  Yes Nate Gan MD   aspirin 81 MG EC tablet Take 1 tablet by mouth daily   Yes Historical Provider, MD   Blood

## 2023-09-07 ENCOUNTER — OFFICE VISIT (OUTPATIENT)
Dept: DIABETES SERVICES | Age: 73
End: 2023-09-07
Payer: MEDICARE

## 2023-09-07 ENCOUNTER — OFFICE VISIT (OUTPATIENT)
Dept: INTERNAL MEDICINE | Age: 73
End: 2023-09-07
Payer: MEDICARE

## 2023-09-07 VITALS
WEIGHT: 220 LBS | DIASTOLIC BLOOD PRESSURE: 78 MMHG | BODY MASS INDEX: 29.8 KG/M2 | SYSTOLIC BLOOD PRESSURE: 136 MMHG | HEART RATE: 68 BPM | HEIGHT: 72 IN | OXYGEN SATURATION: 98 %

## 2023-09-07 VITALS
HEIGHT: 72 IN | RESPIRATION RATE: 16 BRPM | HEART RATE: 68 BPM | DIASTOLIC BLOOD PRESSURE: 78 MMHG | BODY MASS INDEX: 29.8 KG/M2 | WEIGHT: 220 LBS | SYSTOLIC BLOOD PRESSURE: 136 MMHG | OXYGEN SATURATION: 98 %

## 2023-09-07 DIAGNOSIS — I10 PRIMARY HYPERTENSION: Primary | ICD-10-CM

## 2023-09-07 DIAGNOSIS — E78.49 OTHER HYPERLIPIDEMIA: ICD-10-CM

## 2023-09-07 DIAGNOSIS — E11.9 TYPE 2 DIABETES MELLITUS WITHOUT COMPLICATION, WITHOUT LONG-TERM CURRENT USE OF INSULIN (HCC): ICD-10-CM

## 2023-09-07 DIAGNOSIS — I10 ESSENTIAL HYPERTENSION: ICD-10-CM

## 2023-09-07 DIAGNOSIS — D50.9 IRON DEFICIENCY ANEMIA, UNSPECIFIED IRON DEFICIENCY ANEMIA TYPE: ICD-10-CM

## 2023-09-07 DIAGNOSIS — E11.9 TYPE 2 DIABETES MELLITUS WITHOUT COMPLICATION, WITHOUT LONG-TERM CURRENT USE OF INSULIN (HCC): Primary | ICD-10-CM

## 2023-09-07 PROCEDURE — 3078F DIAST BP <80 MM HG: CPT | Performed by: INTERNAL MEDICINE

## 2023-09-07 PROCEDURE — 3051F HG A1C>EQUAL 7.0%<8.0%: CPT | Performed by: NURSE PRACTITIONER

## 2023-09-07 PROCEDURE — 3078F DIAST BP <80 MM HG: CPT | Performed by: NURSE PRACTITIONER

## 2023-09-07 PROCEDURE — 3051F HG A1C>EQUAL 7.0%<8.0%: CPT | Performed by: INTERNAL MEDICINE

## 2023-09-07 PROCEDURE — 1123F ACP DISCUSS/DSCN MKR DOCD: CPT | Performed by: NURSE PRACTITIONER

## 2023-09-07 PROCEDURE — 3075F SYST BP GE 130 - 139MM HG: CPT | Performed by: INTERNAL MEDICINE

## 2023-09-07 PROCEDURE — G8417 CALC BMI ABV UP PARAM F/U: HCPCS | Performed by: INTERNAL MEDICINE

## 2023-09-07 PROCEDURE — G8417 CALC BMI ABV UP PARAM F/U: HCPCS | Performed by: NURSE PRACTITIONER

## 2023-09-07 PROCEDURE — 1036F TOBACCO NON-USER: CPT | Performed by: INTERNAL MEDICINE

## 2023-09-07 PROCEDURE — 3017F COLORECTAL CA SCREEN DOC REV: CPT | Performed by: INTERNAL MEDICINE

## 2023-09-07 PROCEDURE — G8427 DOCREV CUR MEDS BY ELIG CLIN: HCPCS | Performed by: INTERNAL MEDICINE

## 2023-09-07 PROCEDURE — 2022F DILAT RTA XM EVC RTNOPTHY: CPT | Performed by: NURSE PRACTITIONER

## 2023-09-07 PROCEDURE — 99214 OFFICE O/P EST MOD 30 MIN: CPT | Performed by: NURSE PRACTITIONER

## 2023-09-07 PROCEDURE — G8427 DOCREV CUR MEDS BY ELIG CLIN: HCPCS | Performed by: NURSE PRACTITIONER

## 2023-09-07 PROCEDURE — 99214 OFFICE O/P EST MOD 30 MIN: CPT | Performed by: INTERNAL MEDICINE

## 2023-09-07 PROCEDURE — 1123F ACP DISCUSS/DSCN MKR DOCD: CPT | Performed by: INTERNAL MEDICINE

## 2023-09-07 PROCEDURE — 3075F SYST BP GE 130 - 139MM HG: CPT | Performed by: NURSE PRACTITIONER

## 2023-09-07 PROCEDURE — 1036F TOBACCO NON-USER: CPT | Performed by: NURSE PRACTITIONER

## 2023-09-07 PROCEDURE — 2022F DILAT RTA XM EVC RTNOPTHY: CPT | Performed by: INTERNAL MEDICINE

## 2023-09-07 PROCEDURE — 3017F COLORECTAL CA SCREEN DOC REV: CPT | Performed by: NURSE PRACTITIONER

## 2023-09-07 PROCEDURE — 99212 OFFICE O/P EST SF 10 MIN: CPT | Performed by: INTERNAL MEDICINE

## 2023-09-07 PROCEDURE — 99212 OFFICE O/P EST SF 10 MIN: CPT | Performed by: NURSE PRACTITIONER

## 2023-09-07 ASSESSMENT — ENCOUNTER SYMPTOMS
RESPIRATORY NEGATIVE: 1
VOMITING: 0
SHORTNESS OF BREATH: 0
NAUSEA: 0
SHORTNESS OF BREATH: 0
ABDOMINAL PAIN: 0
ABDOMINAL PAIN: 0
COUGH: 0
CONSTIPATION: 0
EYE PAIN: 0
DIARRHEA: 0
BACK PAIN: 0
BLOOD IN STOOL: 0
DIARRHEA: 0

## 2023-09-07 NOTE — PROGRESS NOTES
in great detail dietary modifications they can make to better improve their blood sugars. -follow up diabetes education completed, all questions answered. -a1c is stable, he will consider adding glp1, he will check on price with insurance. Discussed signs and symptoms of hyper/hypoglycemia and how to treat. Encouraged 150 minutes of physical activity per week. Follow a low carbohydrate diet. Encouraged at least 7 hours of sleep. The patient was informed of the goals of diabetes management. This can only be accomplished by watching their diet and exercise levels. We certainly use medicines to help attain these goals. The consequences of not controlling blood sugars were discussed. These include blindness, heart disease, stroke, kidney disease, and possibly need for dialysis. They were told to be careful with their foot care as diabetics often have nerve damage, infections and risk for limb amutations . They also need a dilated eye exam yearly. We discussed the issues of diet, exercise, medication, complication avoidance, reviewed the signs and symptoms of diabetes, hypoglycemic episodes, significance of HbA1C.     - Hemoglobin A1C; Future  - Basic Metabolic Panel; Future    2. Other hyperlipidemia  stable, lipid panel reviewed, continue current medications. Diet and exercise. 3. Essential hypertension   stable, continue current medications. Diet and exercise Seek emergent care if chest pain develops. Answered all patient questions. Agrees to follow plan of care and to follow up in 3 months, sooner if needed. Call office if unexplained blood sugars less than 70 occur or above 400. Call office or access [employer] with any further questions or concerns. Be sure to bring glucometer/food log at next appointment. Total time spent reviewing chart, labs, counseling patient and documenting on the date of the encounter: 30 min.       Electronically signed by AXEL Singer CNP on 9/7/2023

## 2023-09-07 NOTE — PROGRESS NOTES
patient questions answered. Ptvoiced understanding. Reviewed health maintenance. Instructed to continue currentmedications, diet and exercise. Patient agreed with treatment plan. Follow up asdirected.      Electronically signed by Kennedy Murillo MD on 9/7/2023 at 9:26 AM

## 2023-11-16 ENCOUNTER — OFFICE VISIT (OUTPATIENT)
Dept: PODIATRY | Age: 73
End: 2023-11-16

## 2023-11-16 VITALS
BODY MASS INDEX: 29.8 KG/M2 | SYSTOLIC BLOOD PRESSURE: 127 MMHG | DIASTOLIC BLOOD PRESSURE: 83 MMHG | WEIGHT: 220 LBS | HEIGHT: 72 IN | HEART RATE: 68 BPM

## 2023-11-16 DIAGNOSIS — E11.42 DM TYPE 2 WITH DIABETIC PERIPHERAL NEUROPATHY (HCC): Primary | ICD-10-CM

## 2023-11-16 DIAGNOSIS — L84 CORNS AND CALLOSITIES: ICD-10-CM

## 2023-11-16 NOTE — PROGRESS NOTES
took place with #10 blade or tissue nippers. Patient advised about OTC treatments for nails and callous. Patient will follow up in 10 weeks for routine foot care or PRN if any further problems arise.

## 2023-11-20 DIAGNOSIS — I10 ESSENTIAL HYPERTENSION: ICD-10-CM

## 2023-11-20 RX ORDER — LISINOPRIL 10 MG/1
TABLET ORAL
Qty: 90 TABLET | Refills: 0 | Status: SHIPPED | OUTPATIENT
Start: 2023-11-20

## 2023-11-20 NOTE — TELEPHONE ENCOUNTER
Yahaira Holland called requesting a refill of the below medication which has been pended for you:     Requested Prescriptions     Pending Prescriptions Disp Refills    lisinopril (PRINIVIL;ZESTRIL) 10 MG tablet [Pharmacy Med Name: Lisinopril Oral Tablet 10 MG] 90 tablet 0     Sig: TAKE 1 TABLET BY MOUTH EVERY DAY       Last Appointment Date: 9/7/2023  Next Appointment Date: 3/7/2024    No Known Allergies

## 2023-12-05 ENCOUNTER — HOSPITAL ENCOUNTER (OUTPATIENT)
Age: 73
Discharge: HOME | End: 2023-12-05
Payer: COMMERCIAL

## 2023-12-05 ENCOUNTER — HOSPITAL ENCOUNTER (OUTPATIENT)
Age: 73
End: 2023-12-05
Payer: COMMERCIAL

## 2023-12-05 DIAGNOSIS — E11.9 TYPE 2 DIABETES MELLITUS WITHOUT COMPLICATION, WITHOUT LONG-TERM CURRENT USE OF INSULIN (HCC): ICD-10-CM

## 2023-12-05 LAB
ANION GAP SERPL CALCULATED.3IONS-SCNC: 8 MMOL/L (ref 9–17)
BUN SERPL-MCNC: 15 MG/DL (ref 8–23)
BUN/CREAT SERPL: 17 (ref 9–20)
CALCIUM SERPL-MCNC: 9.1 MG/DL (ref 8.6–10.4)
CHLORIDE SERPL-SCNC: 102 MMOL/L (ref 98–107)
CO2 SERPL-SCNC: 27 MMOL/L (ref 20–31)
CREAT SERPL-MCNC: 0.9 MG/DL (ref 0.7–1.2)
EST. AVERAGE GLUCOSE BLD GHB EST-MCNC: 160 MG/DL
GFR SERPL CREATININE-BSD FRML MDRD: >60 ML/MIN/1.73M2
GLUCOSE SERPL-MCNC: 153 MG/DL (ref 70–99)
HBA1C MFR BLD: 7.2 % (ref 4–6)
POTASSIUM SERPL-SCNC: 4.4 MMOL/L (ref 3.7–5.3)
SODIUM SERPL-SCNC: 137 MMOL/L (ref 135–144)

## 2023-12-05 PROCEDURE — 83036 HEMOGLOBIN GLYCOSYLATED A1C: CPT

## 2023-12-05 PROCEDURE — 36415 COLL VENOUS BLD VENIPUNCTURE: CPT

## 2023-12-05 PROCEDURE — 80048 BASIC METABOLIC PNL TOTAL CA: CPT

## 2023-12-07 ENCOUNTER — OFFICE VISIT (OUTPATIENT)
Dept: DIABETES SERVICES | Age: 73
End: 2023-12-07
Payer: COMMERCIAL

## 2023-12-07 VITALS
SYSTOLIC BLOOD PRESSURE: 112 MMHG | WEIGHT: 218 LBS | DIASTOLIC BLOOD PRESSURE: 64 MMHG | OXYGEN SATURATION: 98 % | BODY MASS INDEX: 29.53 KG/M2 | HEIGHT: 72 IN | HEART RATE: 68 BPM

## 2023-12-07 DIAGNOSIS — I10 ESSENTIAL HYPERTENSION: ICD-10-CM

## 2023-12-07 DIAGNOSIS — E11.9 TYPE 2 DIABETES MELLITUS WITHOUT COMPLICATION, WITHOUT LONG-TERM CURRENT USE OF INSULIN (HCC): ICD-10-CM

## 2023-12-07 DIAGNOSIS — E78.49 OTHER HYPERLIPIDEMIA: ICD-10-CM

## 2023-12-07 PROCEDURE — 3074F SYST BP LT 130 MM HG: CPT | Performed by: NURSE PRACTITIONER

## 2023-12-07 PROCEDURE — G8427 DOCREV CUR MEDS BY ELIG CLIN: HCPCS | Performed by: NURSE PRACTITIONER

## 2023-12-07 PROCEDURE — 1036F TOBACCO NON-USER: CPT | Performed by: NURSE PRACTITIONER

## 2023-12-07 PROCEDURE — 99214 OFFICE O/P EST MOD 30 MIN: CPT | Performed by: NURSE PRACTITIONER

## 2023-12-07 PROCEDURE — 3078F DIAST BP <80 MM HG: CPT | Performed by: NURSE PRACTITIONER

## 2023-12-07 PROCEDURE — 3051F HG A1C>EQUAL 7.0%<8.0%: CPT | Performed by: NURSE PRACTITIONER

## 2023-12-07 PROCEDURE — G8417 CALC BMI ABV UP PARAM F/U: HCPCS | Performed by: NURSE PRACTITIONER

## 2023-12-07 PROCEDURE — G8484 FLU IMMUNIZE NO ADMIN: HCPCS | Performed by: NURSE PRACTITIONER

## 2023-12-07 PROCEDURE — 2022F DILAT RTA XM EVC RTNOPTHY: CPT | Performed by: NURSE PRACTITIONER

## 2023-12-07 PROCEDURE — 3017F COLORECTAL CA SCREEN DOC REV: CPT | Performed by: NURSE PRACTITIONER

## 2023-12-07 PROCEDURE — 1123F ACP DISCUSS/DSCN MKR DOCD: CPT | Performed by: NURSE PRACTITIONER

## 2023-12-07 RX ORDER — BLOOD SUGAR DIAGNOSTIC
STRIP MISCELLANEOUS
Qty: 200 EACH | Refills: 3 | Status: SHIPPED | OUTPATIENT
Start: 2023-12-07 | End: 2023-12-07 | Stop reason: SDUPTHER

## 2023-12-07 RX ORDER — BLOOD SUGAR DIAGNOSTIC
STRIP MISCELLANEOUS
Qty: 200 EACH | Refills: 5 | Status: SHIPPED | OUTPATIENT
Start: 2023-12-07

## 2023-12-07 RX ORDER — BLOOD SUGAR DIAGNOSTIC
STRIP MISCELLANEOUS
Qty: 200 EACH | Refills: 3 | Status: CANCELLED | OUTPATIENT
Start: 2023-12-07

## 2023-12-07 RX ORDER — LISINOPRIL 10 MG/1
10 TABLET ORAL DAILY
Qty: 90 TABLET | Refills: 0 | Status: SHIPPED | OUTPATIENT
Start: 2023-12-07

## 2023-12-07 RX ORDER — METFORMIN HYDROCHLORIDE 500 MG/1
1000 TABLET, EXTENDED RELEASE ORAL 2 TIMES DAILY
Qty: 360 TABLET | Refills: 0 | Status: SHIPPED | OUTPATIENT
Start: 2023-12-07

## 2023-12-07 RX ORDER — ROSUVASTATIN CALCIUM 10 MG/1
10 TABLET, COATED ORAL DAILY
Qty: 90 TABLET | Refills: 0 | Status: SHIPPED | OUTPATIENT
Start: 2023-12-07

## 2023-12-07 ASSESSMENT — ENCOUNTER SYMPTOMS
ABDOMINAL PAIN: 0
DIARRHEA: 0
SHORTNESS OF BREATH: 0
RESPIRATORY NEGATIVE: 1

## 2023-12-07 NOTE — TELEPHONE ENCOUNTER
[address] [place] [address] called in on patients Zehra test strips that were sent over. They can not accept rx with directions of \"as needed\" and will need a new script with directions. New RX pended if agreeable.      Last Appt:  12/7/2023  Next Appt:  3/7/2024

## 2023-12-07 NOTE — PROGRESS NOTES
[address] [id number] [phone]        HISTORY:    Gretel Murillo presents today for evaluation and management of:  Chief Complaint   Patient presents with    Diabetes     3 month follow up       Patient Care Team:  Klain Celaya MD as PCP - General (Internal Medicine)  Kalin Celaya MD as PCP - Empaneled Provider  AXEL Harry - CNP as Nurse Practitioner (Nurse Practitioner)      Interval History:    Past DM Medications   Glimepiride-therapy completed  Starlix-therapy completed  Ozempic-cost     Current Diabetic Medications  Metformin 1000 mg bid     DKA episodes: 0    09/07/23   Gretel Murillo is a 68 y.o. male patient who presents to clinic today for his diabetes. he has a history of HTN, hyperlipidemia, PAD and obesity  which contributes to his diabetes. At previous visit diabetes counseling was provided. he denies any current signs or symptoms of hyper/hypoglycemia. he states they are taking their medications as prescribed without any adverse effects. Diet: regular  Exercise: none  BS testing: fasting range: 150-190, patient tests 1 time(s) per day  Hypoglycemia: No  Hypoglycemia as needed treatment: snack  Issues:   Diabetic foot exam up-to-date: Yes  Diabetic retinal exam up-to-date: Yes     Diabetes complications:none    56/43/70   Gretel Murillo is a 68 y.o. male patient who presents to clinic today for his diabetes. he has a history of HTN, hyperlipidemia, PAD and obesity which contributes to his diabetes. At previous visit diabetes counseling was provided. he denies any current signs or symptoms of hyper/hypoglycemia. he states they are taking their medications as prescribed without any adverse effects.    Diet: regular  Exercise: none  BS testing: fasting range: 150-190, patient tests 1 time(s) per day  Hypoglycemia: No  Hypoglycemia as needed

## 2024-01-25 ENCOUNTER — OFFICE VISIT (OUTPATIENT)
Dept: PODIATRY | Age: 74
End: 2024-01-25
Payer: MEDICARE

## 2024-01-25 VITALS
HEART RATE: 76 BPM | RESPIRATION RATE: 20 BRPM | BODY MASS INDEX: 30.76 KG/M2 | SYSTOLIC BLOOD PRESSURE: 128 MMHG | DIASTOLIC BLOOD PRESSURE: 70 MMHG | WEIGHT: 226.8 LBS

## 2024-01-25 DIAGNOSIS — E11.42 DM TYPE 2 WITH DIABETIC PERIPHERAL NEUROPATHY (HCC): Primary | ICD-10-CM

## 2024-01-25 DIAGNOSIS — B35.1 DERMATOPHYTOSIS OF NAIL: ICD-10-CM

## 2024-01-25 DIAGNOSIS — L84 CORNS AND CALLOSITIES: ICD-10-CM

## 2024-01-25 PROCEDURE — 11720 DEBRIDE NAIL 1-5: CPT | Performed by: PODIATRIST

## 2024-01-25 PROCEDURE — 11056 PARNG/CUTG B9 HYPRKR LES 2-4: CPT | Performed by: PODIATRIST

## 2024-01-25 NOTE — PROGRESS NOTES
Foot Care Worksheet  PCP: Ryan Aleman MD  Last visit: 09 / 01 / 2024    Nail description: Thick , Yellow , Crumbly , Marked limitation of ambulation     Pain resulting from thickened and dystrophy of nail plate No    Nails involved  Right   1,5  (T5-T9)  Left    1,5    (TA-T4)    Q7 1 Class A Finding - Non traumatic amputation of foot No    Q8 2 Class B Findings - Absent DP pulse No, Absent PT pulse No, Advanced tropic changes (3 required) Yes    Decrease hair growth Yes, Nail changes/thickening Yes, Pigmented changes/discoloration Yes, Skin texture (thin, shiny) No, Skin color (rubor/redness) No    Q9 1 Class B and 2 Class C Findings  Claudication No, Temperature change Yes, Paresthesia Yes, Burning No, Edema Yes    Subjective:  Patient presents to [facility] today for routine diabetic foot care.   Patient's diabetic control has been not changed.    No Known Allergies    Past Medical History:   Diagnosis Date    Anemia     minimal anemia, hemoglobin 13.6    Benign prostatic hypertrophy     Cyst in hand     distal phalanx, mid finger left hand    Hyperlipidemia     Hypertension     Keratosis     right forehead    Overweight(278.02)     Seborrhea     Type II or unspecified type diabetes mellitus without mention of complication, not stated as uncontrolled        Prior to Admission medications    Medication Sig Start Date End Date Taking? Authorizing Provider   metFORMIN (GLUCOPHAGE-XR) 500 MG extended release tablet Take 2 tablets by mouth 2 times daily 12/7/23  Yes Keke Bright APRN - CNP   rosuvastatin (CRESTOR) 10 MG tablet Take 1 tablet by mouth daily 12/7/23  Yes Keke Bright APRN - CNP   lisinopril (PRINIVIL;ZESTRIL) 10 MG tablet Take 1 tablet by mouth daily 12/7/23  Yes Keke Bright APRN - CNP   blood glucose test strips (ACCU-CHEK BYRON PLUS) strip Use to check blood glucose once daily. 12/7/23  Yes Keke Bright APRN - CNP   aspirin 81 MG EC tablet Take 1 tablet by mouth

## 2024-02-29 ENCOUNTER — HOSPITAL ENCOUNTER (OUTPATIENT)
Age: 74
Discharge: HOME | End: 2024-02-29
Payer: MEDICARE

## 2024-02-29 DIAGNOSIS — D50.9 IRON DEFICIENCY ANEMIA, UNSPECIFIED IRON DEFICIENCY ANEMIA TYPE: ICD-10-CM

## 2024-02-29 DIAGNOSIS — E11.9 TYPE 2 DIABETES MELLITUS WITHOUT COMPLICATION, WITHOUT LONG-TERM CURRENT USE OF INSULIN (HCC): ICD-10-CM

## 2024-02-29 LAB
ANION GAP SERPL CALCULATED.3IONS-SCNC: 8 MMOL/L (ref 9–17)
BUN SERPL-MCNC: 18 MG/DL (ref 8–23)
BUN/CREAT SERPL: 18 (ref 9–20)
CALCIUM SERPL-MCNC: 9.4 MG/DL (ref 8.6–10.4)
CHLORIDE SERPL-SCNC: 103 MMOL/L (ref 98–107)
CO2 SERPL-SCNC: 30 MMOL/L (ref 20–31)
CREAT SERPL-MCNC: 1 MG/DL (ref 0.7–1.2)
EST. AVERAGE GLUCOSE BLD GHB EST-MCNC: 163 MG/DL
GFR SERPL CREATININE-BSD FRML MDRD: >60 ML/MIN/1.73M2
GLUCOSE SERPL-MCNC: 150 MG/DL (ref 70–99)
HBA1C MFR BLD: 7.3 % (ref 4–6)
HGB BLD-MCNC: 14.3 G/DL (ref 13–17)
IRON SATN MFR SERPL: 37 % (ref 20–55)
IRON SERPL-MCNC: 106 UG/DL (ref 59–158)
POTASSIUM SERPL-SCNC: 4.4 MMOL/L (ref 3.7–5.3)
SODIUM SERPL-SCNC: 141 MMOL/L (ref 135–144)
TIBC SERPL-MCNC: 286 UG/DL (ref 250–450)
UNSATURATED IRON BINDING CAPACITY: 180 UG/DL (ref 112–347)

## 2024-02-29 PROCEDURE — 83036 HEMOGLOBIN GLYCOSYLATED A1C: CPT

## 2024-02-29 PROCEDURE — 83550 IRON BINDING TEST: CPT

## 2024-02-29 PROCEDURE — 83540 ASSAY OF IRON: CPT

## 2024-02-29 PROCEDURE — 80048 BASIC METABOLIC PNL TOTAL CA: CPT

## 2024-02-29 PROCEDURE — 85018 HEMOGLOBIN: CPT

## 2024-02-29 PROCEDURE — 36415 COLL VENOUS BLD VENIPUNCTURE: CPT

## 2024-03-07 ENCOUNTER — OFFICE VISIT (OUTPATIENT)
Dept: DIABETES SERVICES | Age: 74
End: 2024-03-07
Payer: MEDICARE

## 2024-03-07 ENCOUNTER — OFFICE VISIT (OUTPATIENT)
Dept: INTERNAL MEDICINE | Age: 74
End: 2024-03-07
Payer: MEDICARE

## 2024-03-07 VITALS
BODY MASS INDEX: 31.02 KG/M2 | DIASTOLIC BLOOD PRESSURE: 76 MMHG | WEIGHT: 229 LBS | HEIGHT: 72 IN | HEART RATE: 60 BPM | SYSTOLIC BLOOD PRESSURE: 138 MMHG | RESPIRATION RATE: 16 BRPM | OXYGEN SATURATION: 98 %

## 2024-03-07 VITALS
HEIGHT: 72 IN | BODY MASS INDEX: 31.02 KG/M2 | WEIGHT: 229 LBS | SYSTOLIC BLOOD PRESSURE: 138 MMHG | DIASTOLIC BLOOD PRESSURE: 76 MMHG | OXYGEN SATURATION: 98 % | HEART RATE: 60 BPM

## 2024-03-07 DIAGNOSIS — I10 ESSENTIAL HYPERTENSION: ICD-10-CM

## 2024-03-07 DIAGNOSIS — E11.9 TYPE 2 DIABETES MELLITUS WITHOUT COMPLICATION, WITHOUT LONG-TERM CURRENT USE OF INSULIN (HCC): ICD-10-CM

## 2024-03-07 DIAGNOSIS — D50.9 IRON DEFICIENCY ANEMIA, UNSPECIFIED IRON DEFICIENCY ANEMIA TYPE: ICD-10-CM

## 2024-03-07 DIAGNOSIS — Z12.5 SPECIAL SCREENING FOR MALIGNANT NEOPLASM OF PROSTATE: ICD-10-CM

## 2024-03-07 DIAGNOSIS — Z00.00 MEDICARE ANNUAL WELLNESS VISIT, SUBSEQUENT: ICD-10-CM

## 2024-03-07 DIAGNOSIS — N52.9 ERECTILE DYSFUNCTION, UNSPECIFIED ERECTILE DYSFUNCTION TYPE: ICD-10-CM

## 2024-03-07 DIAGNOSIS — I10 PRIMARY HYPERTENSION: ICD-10-CM

## 2024-03-07 DIAGNOSIS — E78.49 OTHER HYPERLIPIDEMIA: ICD-10-CM

## 2024-03-07 DIAGNOSIS — Z00.00 GENERAL MEDICAL EXAM: Primary | ICD-10-CM

## 2024-03-07 DIAGNOSIS — I73.9 PAD (PERIPHERAL ARTERY DISEASE) (HCC): ICD-10-CM

## 2024-03-07 PROCEDURE — G0439 PPPS, SUBSEQ VISIT: HCPCS | Performed by: INTERNAL MEDICINE

## 2024-03-07 PROCEDURE — G2211 COMPLEX E/M VISIT ADD ON: HCPCS | Performed by: NURSE PRACTITIONER

## 2024-03-07 PROCEDURE — G8427 DOCREV CUR MEDS BY ELIG CLIN: HCPCS | Performed by: INTERNAL MEDICINE

## 2024-03-07 PROCEDURE — G8484 FLU IMMUNIZE NO ADMIN: HCPCS | Performed by: NURSE PRACTITIONER

## 2024-03-07 PROCEDURE — G8427 DOCREV CUR MEDS BY ELIG CLIN: HCPCS | Performed by: NURSE PRACTITIONER

## 2024-03-07 PROCEDURE — 1123F ACP DISCUSS/DSCN MKR DOCD: CPT | Performed by: INTERNAL MEDICINE

## 2024-03-07 PROCEDURE — 2022F DILAT RTA XM EVC RTNOPTHY: CPT | Performed by: INTERNAL MEDICINE

## 2024-03-07 PROCEDURE — 1036F TOBACCO NON-USER: CPT | Performed by: INTERNAL MEDICINE

## 2024-03-07 PROCEDURE — 3078F DIAST BP <80 MM HG: CPT | Performed by: INTERNAL MEDICINE

## 2024-03-07 PROCEDURE — 1036F TOBACCO NON-USER: CPT | Performed by: NURSE PRACTITIONER

## 2024-03-07 PROCEDURE — G8417 CALC BMI ABV UP PARAM F/U: HCPCS | Performed by: NURSE PRACTITIONER

## 2024-03-07 PROCEDURE — 3075F SYST BP GE 130 - 139MM HG: CPT | Performed by: NURSE PRACTITIONER

## 2024-03-07 PROCEDURE — 1123F ACP DISCUSS/DSCN MKR DOCD: CPT | Performed by: NURSE PRACTITIONER

## 2024-03-07 PROCEDURE — G8484 FLU IMMUNIZE NO ADMIN: HCPCS | Performed by: INTERNAL MEDICINE

## 2024-03-07 PROCEDURE — 3051F HG A1C>EQUAL 7.0%<8.0%: CPT | Performed by: INTERNAL MEDICINE

## 2024-03-07 PROCEDURE — 99212 OFFICE O/P EST SF 10 MIN: CPT | Performed by: INTERNAL MEDICINE

## 2024-03-07 PROCEDURE — 3078F DIAST BP <80 MM HG: CPT | Performed by: NURSE PRACTITIONER

## 2024-03-07 PROCEDURE — 3075F SYST BP GE 130 - 139MM HG: CPT | Performed by: INTERNAL MEDICINE

## 2024-03-07 PROCEDURE — 3017F COLORECTAL CA SCREEN DOC REV: CPT | Performed by: INTERNAL MEDICINE

## 2024-03-07 PROCEDURE — 3017F COLORECTAL CA SCREEN DOC REV: CPT | Performed by: NURSE PRACTITIONER

## 2024-03-07 PROCEDURE — 99214 OFFICE O/P EST MOD 30 MIN: CPT | Performed by: NURSE PRACTITIONER

## 2024-03-07 PROCEDURE — 2022F DILAT RTA XM EVC RTNOPTHY: CPT | Performed by: NURSE PRACTITIONER

## 2024-03-07 PROCEDURE — 99214 OFFICE O/P EST MOD 30 MIN: CPT | Performed by: INTERNAL MEDICINE

## 2024-03-07 PROCEDURE — 3051F HG A1C>EQUAL 7.0%<8.0%: CPT | Performed by: NURSE PRACTITIONER

## 2024-03-07 PROCEDURE — G8417 CALC BMI ABV UP PARAM F/U: HCPCS | Performed by: INTERNAL MEDICINE

## 2024-03-07 PROCEDURE — 99212 OFFICE O/P EST SF 10 MIN: CPT | Performed by: NURSE PRACTITIONER

## 2024-03-07 RX ORDER — LISINOPRIL 10 MG/1
10 TABLET ORAL DAILY
Qty: 90 TABLET | Refills: 3 | Status: CANCELLED | OUTPATIENT
Start: 2024-03-07

## 2024-03-07 RX ORDER — BLOOD-GLUCOSE METER
EACH MISCELLANEOUS
Status: CANCELLED | OUTPATIENT
Start: 2024-03-07

## 2024-03-07 RX ORDER — GLUCOSAMINE HCL/CHONDROITIN SU 500-400 MG
50 CAPSULE ORAL DAILY
Qty: 100 STRIP | Refills: 5 | Status: SHIPPED | OUTPATIENT
Start: 2024-03-07

## 2024-03-07 RX ORDER — SILDENAFIL CITRATE 20 MG/1
20 TABLET ORAL PRN
Qty: 10 TABLET | Refills: 3 | Status: SHIPPED | OUTPATIENT
Start: 2024-03-07

## 2024-03-07 RX ORDER — ROSUVASTATIN CALCIUM 10 MG/1
10 TABLET, COATED ORAL DAILY
Qty: 90 TABLET | Refills: 3 | Status: CANCELLED | OUTPATIENT
Start: 2024-03-07

## 2024-03-07 RX ORDER — METFORMIN HYDROCHLORIDE 500 MG/1
1000 TABLET, EXTENDED RELEASE ORAL 2 TIMES DAILY
Qty: 360 TABLET | Refills: 5 | Status: SHIPPED | OUTPATIENT
Start: 2024-03-07

## 2024-03-07 SDOH — ECONOMIC STABILITY: FOOD INSECURITY: WITHIN THE PAST 12 MONTHS, YOU WORRIED THAT YOUR FOOD WOULD RUN OUT BEFORE YOU GOT MONEY TO BUY MORE.: NEVER TRUE

## 2024-03-07 SDOH — ECONOMIC STABILITY: FOOD INSECURITY: WITHIN THE PAST 12 MONTHS, THE FOOD YOU BOUGHT JUST DIDN'T LAST AND YOU DIDN'T HAVE MONEY TO GET MORE.: NEVER TRUE

## 2024-03-07 SDOH — ECONOMIC STABILITY: INCOME INSECURITY: HOW HARD IS IT FOR YOU TO PAY FOR THE VERY BASICS LIKE FOOD, HOUSING, MEDICAL CARE, AND HEATING?: NOT HARD AT ALL

## 2024-03-07 ASSESSMENT — ENCOUNTER SYMPTOMS
VOMITING: 0
CONSTIPATION: 0
EYE PAIN: 0
DIARRHEA: 0
BLOOD IN STOOL: 0
ABDOMINAL PAIN: 0
DIARRHEA: 0
ABDOMINAL PAIN: 0
RESPIRATORY NEGATIVE: 1
SHORTNESS OF BREATH: 0
BACK PAIN: 0
SHORTNESS OF BREATH: 0
NAUSEA: 0
COUGH: 0

## 2024-03-07 ASSESSMENT — PATIENT HEALTH QUESTIONNAIRE - PHQ9
SUM OF ALL RESPONSES TO PHQ QUESTIONS 1-9: 0
1. LITTLE INTEREST OR PLEASURE IN DOING THINGS: 0
SUM OF ALL RESPONSES TO PHQ9 QUESTIONS 1 & 2: 0
SUM OF ALL RESPONSES TO PHQ QUESTIONS 1-9: 0
2. FEELING DOWN, DEPRESSED OR HOPELESS: 0

## 2024-03-07 NOTE — PATIENT INSTRUCTIONS
Call to check on price---- ozempic 0.5 mg once weekly , trulicity 0.75 mg once weekly or mounjaro 2.5 mg once weekly or  rybelsus  7mg daily

## 2024-03-07 NOTE — PATIENT INSTRUCTIONS
Learning About Being Active as an Older Adult  Why is being active important as you get older?     Being active is one of the best things you can do for your health. And it's never too late to start. Being active--or getting active, if you aren't already--has definite benefits. It can:  Give you more energy,  Keep your mind sharp.  Improve balance to reduce your risk of falls.  Help you manage chronic illness with fewer medicines.  No matter how old you are, how fit you are, or what health problems you have, there is a form of activity that will work for you. And the more physical activity you can do, the better your overall health will be.  What kinds of activity can help you stay healthy?  Being more active will make your daily activities easier. Physical activity includes planned exercise and things you do in daily life. There are four types of activity:  Aerobic.  Doing aerobic activity makes your heart and lungs strong.  Includes walking, dancing, and gardening.  Aim for at least 2½ hours spread throughout the week.  It improves your energy and can help you sleep better.  Muscle-strengthening.  This type of activity can help maintain muscle and strengthen bones.  Includes climbing stairs, using resistance bands, and lifting or carrying heavy loads.  Aim for at least twice a week.  It can help protect the knees and other joints.  Stretching.  Stretching gives you better range of motion in joints and muscles.  Includes upper arm stretches, calf stretches, and gentle yoga.  Aim for at least twice a week, preferably after your muscles are warmed up from other activities.  It can help you function better in daily life.  Balancing.  This helps you stay coordinated and have good posture.  Includes heel-to-toe walking, allen chi, and certain types of yoga.  Aim for at least 3 days a week.  It can reduce your risk of falling.  Even if you have a hard time meeting the recommendations, it's better to be more active

## 2024-03-07 NOTE — PROGRESS NOTES
[facility]  [address] [facility] [address]  Dept: [phone]  Dept Fax: [phone]  Loc: [phone]     Steve Alejo is a 73 y.o. male who presents today for his medical conditions/complaintsas noted below.  Steve Alejo is c/o of   Chief Complaint   Patient presents with    Medicare AWV    Hypertension    Hyperlipidemia    Diabetes         HPI:     Hypertension  This is a chronic problem. The current episode started more than 1 year ago. The problem has been waxing and waning since onset. The problem is controlled. Pertinent negatives include no chest pain, headaches, neck pain, palpitations or shortness of breath. Hypertensive end-organ damage includes angina.   Hyperlipidemia  This is a chronic problem. The current episode started more than 1 year ago. The problem is controlled. Recent lipid tests were reviewed and are variable. Pertinent negatives include no chest pain or shortness of breath.   Diabetes  He presents for his follow-up diabetic visit. He has type 2 diabetes mellitus. The initial diagnosis of diabetes was made 13 years ago. His disease course has been fluctuating. Pertinent negatives for hypoglycemia include no confusion, dizziness, headaches, nervousness/anxiousness or pallor. Pertinent negatives for diabetes include no chest pain, no polydipsia, no polyuria and no weakness.   Other  This is a recurrent (4-General medical exam,  5-PAD) problem. The current episode started today. The problem occurs intermittently. The problem has been waxing and waning. Pertinent negatives include no abdominal pain, arthralgias, chest pain, chills, coughing, fever, headaches, nausea, neck pain, numbness, rash, vomiting or weakness.       Hemoglobin A1C (%)   Date Value   02/29/2024 7.3 (H)   12/05/2023 7.2 (H)   08/31/2023 7.2 (H)            No components found for: \"LABMICR\"  LDL Cholesterol (mg/dL)   Date 
Medicare Annual Wellness Visit    Steve Alejo is here for Medicare AWV, Hypertension, Hyperlipidemia, and Diabetes    Assessment & Plan     Recommendations for Preventive Services Due: see orders and patient instructions/AVS.  Recommended screening schedule for the next 5-10 years is provided to the patient in written form: see Patient Instructions/AVS.     Return in 6 months (on 9/13/2024) for Medicare Annual Wellness Visit in 1 year, Hypertension, Hyperlipidemia, Diabetes.     Subjective       Patient's complete Health Risk Assessment and screening values have been reviewed and are found in Flowsheets. The following problems were reviewed today and where indicated follow up appointments were made and/or referrals ordered.    Positive Risk Factor Screenings with Interventions:                Activity, Diet, and Weight:  On average, how many days per week do you engage in moderate to strenuous exercise (like a brisk walk)?: 0 days  On average, how many minutes do you engage in exercise at this level?: 0 min    Do you eat balanced/healthy meals regularly?: Yes    Body mass index is 31.06 kg/m². (!) Abnormal      Inactivity Interventions:  Patient declined any further interventions or treatment  Obesity Interventions:  Patient declines any further evaluation or treatment                               Objective   Vitals:    03/07/24 0836   BP: 138/76   Site: Left Upper Arm   Position: Sitting   Cuff Size: Large Adult   Pulse: 60   Resp: 16   SpO2: 98%   Weight: 103.9 kg (229 lb)   Height: 1.829 m (6')      Body mass index is 31.06 kg/m².             No Known Allergies  Prior to Visit Medications    Medication Sig Taking? Authorizing Provider   metFORMIN (GLUCOPHAGE-XR) 500 MG extended release tablet Take 2 tablets by mouth 2 times daily  Keke Bright APRN - CNP   blood glucose monitor strips 50 strips by Other route daily Test 1 times a day DX: E11.65  Keke Bright APRN - CNP   rosuvastatin (CRESTOR) 10 
All patient questions answered. Ptvoiced understanding. Reviewed health maintenance.  Instructed to continue currentmedications, diet and exercise.  Patient agreed with treatment plan. Follow up asdirected.     Electronically signed by Ryan Aleman MD on 3/7/2024 at 9:09 AM

## 2024-03-07 NOTE — PROGRESS NOTES
tablets by mouth 2 times daily  Dispense: 360 tablet; Refill: 5    2. Other hyperlipidemia  stable, lipid panel reviewed, continue current medications. Diet and exercise.       3. Essential hypertension   stable, continue current medications. Diet and exercise Seek emergent care if chest pain develops.               Answered all patient questions. Agrees to follow plan of care and to follow up in 3 months, sooner if needed. Call office if unexplained blood sugars less than 70 occur or above 400. Call office or access [email or web address] with any further questions or concerns. Be sure to bring glucometer/food log at next appointment.       Total time spent reviewing chart, labs, counseling patient and documenting on the date of the encounter: 30 min.      Electronically signed by AXEL Jeronimo CNP on 3/7/2024 at 8:00 AM      (Please note that portions of this note were completed with a voice-recognition program. Efforts were made to edit the dictation but occasionally words are mis-transcribed.)

## 2024-04-04 ENCOUNTER — OFFICE VISIT (OUTPATIENT)
Dept: PODIATRY | Age: 74
End: 2024-04-04
Payer: MEDICARE

## 2024-04-04 VITALS
HEART RATE: 72 BPM | RESPIRATION RATE: 20 BRPM | WEIGHT: 228.4 LBS | SYSTOLIC BLOOD PRESSURE: 122 MMHG | BODY MASS INDEX: 30.98 KG/M2 | DIASTOLIC BLOOD PRESSURE: 70 MMHG

## 2024-04-04 DIAGNOSIS — B35.1 DERMATOPHYTOSIS OF NAIL: ICD-10-CM

## 2024-04-04 DIAGNOSIS — L84 CORNS AND CALLOSITIES: ICD-10-CM

## 2024-04-04 DIAGNOSIS — E11.42 DM TYPE 2 WITH DIABETIC PERIPHERAL NEUROPATHY (HCC): Primary | ICD-10-CM

## 2024-04-04 PROCEDURE — 11056 PARNG/CUTG B9 HYPRKR LES 2-4: CPT | Performed by: PODIATRIST

## 2024-04-04 PROCEDURE — 11720 DEBRIDE NAIL 1-5: CPT | Performed by: PODIATRIST

## 2024-05-18 DIAGNOSIS — I10 ESSENTIAL HYPERTENSION: ICD-10-CM

## 2024-05-20 RX ORDER — LISINOPRIL 10 MG/1
10 TABLET ORAL DAILY
Qty: 90 TABLET | Refills: 3 | Status: SHIPPED | OUTPATIENT
Start: 2024-05-20

## 2024-05-29 ENCOUNTER — HOSPITAL ENCOUNTER (OUTPATIENT)
Age: 74
Discharge: HOME | End: 2024-05-29
Payer: MEDICARE

## 2024-05-29 DIAGNOSIS — E11.9 TYPE 2 DIABETES MELLITUS WITHOUT COMPLICATION, WITHOUT LONG-TERM CURRENT USE OF INSULIN (HCC): ICD-10-CM

## 2024-05-29 LAB
ANION GAP SERPL CALCULATED.3IONS-SCNC: 8 MMOL/L (ref 9–17)
BUN SERPL-MCNC: 15 MG/DL (ref 8–23)
BUN/CREAT SERPL: 17 (ref 9–20)
CALCIUM SERPL-MCNC: 9.1 MG/DL (ref 8.6–10.4)
CHLORIDE SERPL-SCNC: 103 MMOL/L (ref 98–107)
CO2 SERPL-SCNC: 28 MMOL/L (ref 20–31)
CREAT SERPL-MCNC: 0.9 MG/DL (ref 0.7–1.2)
EST. AVERAGE GLUCOSE BLD GHB EST-MCNC: 166 MG/DL
GFR, ESTIMATED: >90 ML/MIN/1.73M2
GLUCOSE SERPL-MCNC: 150 MG/DL (ref 70–99)
HBA1C MFR BLD: 7.4 % (ref 4–6)
POTASSIUM SERPL-SCNC: 4.5 MMOL/L (ref 3.7–5.3)
SODIUM SERPL-SCNC: 139 MMOL/L (ref 135–144)

## 2024-05-29 PROCEDURE — 83036 HEMOGLOBIN GLYCOSYLATED A1C: CPT

## 2024-05-29 PROCEDURE — 36415 COLL VENOUS BLD VENIPUNCTURE: CPT

## 2024-05-29 PROCEDURE — 80048 BASIC METABOLIC PNL TOTAL CA: CPT

## 2024-05-30 ENCOUNTER — OFFICE VISIT (OUTPATIENT)
Dept: DIABETES SERVICES | Age: 74
End: 2024-05-30
Payer: MEDICARE

## 2024-05-30 VITALS
BODY MASS INDEX: 30.07 KG/M2 | DIASTOLIC BLOOD PRESSURE: 84 MMHG | SYSTOLIC BLOOD PRESSURE: 124 MMHG | HEART RATE: 81 BPM | OXYGEN SATURATION: 96 % | WEIGHT: 222 LBS | HEIGHT: 72 IN

## 2024-05-30 DIAGNOSIS — E11.9 TYPE 2 DIABETES MELLITUS WITHOUT COMPLICATION, WITHOUT LONG-TERM CURRENT USE OF INSULIN (HCC): Primary | ICD-10-CM

## 2024-05-30 DIAGNOSIS — I10 ESSENTIAL HYPERTENSION: ICD-10-CM

## 2024-05-30 DIAGNOSIS — E78.49 OTHER HYPERLIPIDEMIA: ICD-10-CM

## 2024-05-30 PROCEDURE — 3074F SYST BP LT 130 MM HG: CPT | Performed by: NURSE PRACTITIONER

## 2024-05-30 PROCEDURE — 3017F COLORECTAL CA SCREEN DOC REV: CPT | Performed by: NURSE PRACTITIONER

## 2024-05-30 PROCEDURE — G8417 CALC BMI ABV UP PARAM F/U: HCPCS | Performed by: NURSE PRACTITIONER

## 2024-05-30 PROCEDURE — 3051F HG A1C>EQUAL 7.0%<8.0%: CPT | Performed by: NURSE PRACTITIONER

## 2024-05-30 PROCEDURE — G2211 COMPLEX E/M VISIT ADD ON: HCPCS | Performed by: NURSE PRACTITIONER

## 2024-05-30 PROCEDURE — 99214 OFFICE O/P EST MOD 30 MIN: CPT | Performed by: NURSE PRACTITIONER

## 2024-05-30 PROCEDURE — 99212 OFFICE O/P EST SF 10 MIN: CPT | Performed by: NURSE PRACTITIONER

## 2024-05-30 PROCEDURE — G8427 DOCREV CUR MEDS BY ELIG CLIN: HCPCS | Performed by: NURSE PRACTITIONER

## 2024-05-30 PROCEDURE — 3079F DIAST BP 80-89 MM HG: CPT | Performed by: NURSE PRACTITIONER

## 2024-05-30 PROCEDURE — 1036F TOBACCO NON-USER: CPT | Performed by: NURSE PRACTITIONER

## 2024-05-30 PROCEDURE — 1123F ACP DISCUSS/DSCN MKR DOCD: CPT | Performed by: NURSE PRACTITIONER

## 2024-05-30 PROCEDURE — 2022F DILAT RTA XM EVC RTNOPTHY: CPT | Performed by: NURSE PRACTITIONER

## 2024-05-30 RX ORDER — SEMAGLUTIDE 1.34 MG/ML
INJECTION, SOLUTION SUBCUTANEOUS
Qty: 1 ADJUSTABLE DOSE PRE-FILLED PEN SYRINGE | Refills: 3 | Status: SHIPPED | OUTPATIENT
Start: 2024-05-30

## 2024-05-30 ASSESSMENT — ENCOUNTER SYMPTOMS
ABDOMINAL PAIN: 0
RESPIRATORY NEGATIVE: 1
DIARRHEA: 0
SHORTNESS OF BREATH: 0

## 2024-05-30 NOTE — PROGRESS NOTES
[facility]  [address] [facility] [address] [phone]        HISTORY:    Steve Alejo presents today for evaluation and management of:  Chief Complaint   Patient presents with    Diabetes     2 month follow up       Patient Care Team:  Ryan Aleman MD as PCP - General (Internal Medicine)  [facility]  Keke Bright APRN - CNP as Nurse Practitioner (Nurse Practitioner)      Interval History:    Past DM Medications   Glimepiride-therapy completed  Starlix-therapy completed  Ozempic-cost     Current Diabetic Medications  Metformin 1000 mg bid     DKA episodes: 0    03/07/24   Steve Alejo is a 73 y.o. male patient who presents to clinic today for his diabetes. he has a history of HTN, hyperlipidemia, PAD and obesity which contributes to his diabetes. At previous visit diabetes counseling was provided. he denies any current signs or symptoms of hyper/hypoglycemia. he states they are taking their medications as prescribed without any adverse effects.   Diet: regular  Exercise: none  BS testing: fasting range: 150-190, patient tests 1 time(s) per day  Hypoglycemia: No  Hypoglycemia as needed treatment: snack  Issues:   Diabetic foot exam up-to-date: Yes  Diabetic retinal exam up-to-date: Yes     Diabetes complications:none    05/30/24   Steve Alejo is a 73 y.o. male patient who presents to clinic today for his diabetes. he has a history of HTN, hyperlipidemia, PAD and obesity which contributes to his diabetes. At previous visit diabetes counseling was provided. he denies any current signs or symptoms of hyper/hypoglycemia. he states they are taking their medications as prescribed without any adverse effects.   Diet: regular  Exercise: none  BS testing: fasting range: 130-150 patient tests 1 time(s) per day  Hypoglycemia: No  Hypoglycemia as needed

## 2024-05-30 NOTE — PATIENT INSTRUCTIONS
Start Ozempic   Inject .25 mg ONCE weekly for 4 weeks then increase to 0.5 mg ONCE weekly.   Stay well hydrated and cut portions in half.

## 2024-06-03 ENCOUNTER — TELEPHONE (OUTPATIENT)
Dept: INTERNAL MEDICINE | Age: 74
End: 2024-06-03

## 2024-06-03 RX ORDER — GLIMEPIRIDE 2 MG/1
2 TABLET ORAL
Qty: 90 TABLET | Refills: 1 | Status: SHIPPED | OUTPATIENT
Start: 2024-06-03

## 2024-06-03 NOTE — TELEPHONE ENCOUNTER
Patient returned call.  Informed patient that Ozempic was stopped due to cost and that glimepiride was sent in, as it is more affordable.  Patient stated understanding. No further questions at this time.

## 2024-06-03 NOTE — TELEPHONE ENCOUNTER
Clarified with Martita, Patient called in stating that Ozempic was too expensive.  Patient is a Medicare patient, so he is not able to use a coupon.     Please advise.

## 2024-06-03 NOTE — TELEPHONE ENCOUNTER
Called and left a detailed message informing patient of a medication change.  Attempt at reaching patient is ongoing.

## 2024-06-13 ENCOUNTER — OFFICE VISIT (OUTPATIENT)
Dept: PODIATRY | Age: 74
End: 2024-06-13
Payer: MEDICARE

## 2024-06-13 VITALS
SYSTOLIC BLOOD PRESSURE: 128 MMHG | BODY MASS INDEX: 30.9 KG/M2 | RESPIRATION RATE: 20 BRPM | WEIGHT: 227.8 LBS | HEART RATE: 72 BPM | DIASTOLIC BLOOD PRESSURE: 70 MMHG

## 2024-06-13 DIAGNOSIS — E11.42 DM TYPE 2 WITH DIABETIC PERIPHERAL NEUROPATHY (HCC): Primary | ICD-10-CM

## 2024-06-13 DIAGNOSIS — B35.1 DERMATOPHYTOSIS OF NAIL: ICD-10-CM

## 2024-06-13 DIAGNOSIS — L84 CORNS AND CALLOSITIES: ICD-10-CM

## 2024-06-13 PROCEDURE — 11720 DEBRIDE NAIL 1-5: CPT | Performed by: PODIATRIST

## 2024-06-13 PROCEDURE — 11056 PARNG/CUTG B9 HYPRKR LES 2-4: CPT | Performed by: PODIATRIST

## 2024-06-13 NOTE — PROGRESS NOTES
Foot Care Worksheet  PCP: Ryan Aleman MD  Last visit: 03 / 07 / 2024    Nail description: Thick , Yellow , Crumbly , Marked limitation of ambulation     Pain resulting from thickened and dystrophy of nail plate No    Nails involved  Right   1,5  (T5-T9)  Left    1,5    (TA-T4)    Q7 1 Class A Finding - Non traumatic amputation of foot No    Q8 2 Class B Findings - Absent DP pulse No, Absent PT pulse No, Advanced tropic changes (3 required) Yes    Decrease hair growth Yes, Nail changes/thickening Yes, Pigmented changes/discoloration Yes, Skin texture (thin, shiny) No, Skin color (rubor/redness) No    Q9 1 Class B and 2 Class C Findings  Claudication No, Temperature change Yes, Paresthesia Yes, Burning No, Edema Yes    Subjective:  Patient presents to [facility] today for routine diabetic foot care.   Patient's diabetic control has been not changed.    No Known Allergies    Past Medical History:   Diagnosis Date    Anemia     minimal anemia, hemoglobin 13.6    Benign prostatic hypertrophy     Cyst in hand     distal phalanx, mid finger left hand    Hyperlipidemia     Hypertension     Keratosis     right forehead    Overweight(278.02)     Seborrhea     Type II or unspecified type diabetes mellitus without mention of complication, not stated as uncontrolled        Prior to Admission medications    Medication Sig Start Date End Date Taking? Authorizing Provider   glimepiride (AMARYL) 2 MG tablet Take 1 tablet by mouth every morning (before breakfast) 6/3/24  Yes Keke Bright APRN - CNP   lisinopril (PRINIVIL;ZESTRIL) 10 MG tablet Take 1 tablet by mouth daily 5/20/24  Yes Keke Bright APRN - CNP   metFORMIN (GLUCOPHAGE-XR) 500 MG extended release tablet Take 2 tablets by mouth 2 times daily 3/7/24  Yes Keke Bright APRN - CNP   blood glucose monitor strips 50 strips by Other route daily Test 1 times a day DX: E11.65 3/7/24  Yes Keke Bright APRN - CNP   sildenafil (REVATIO) 20 MG

## 2024-07-05 ENCOUNTER — OFFICE VISIT (OUTPATIENT)
Dept: PRIMARY CARE CLINIC | Age: 74
End: 2024-07-05
Payer: MEDICARE

## 2024-07-05 ENCOUNTER — HOSPITAL ENCOUNTER (OUTPATIENT)
Age: 74
Discharge: HOME | End: 2024-07-05
Payer: MEDICARE

## 2024-07-05 VITALS
TEMPERATURE: 99 F | BODY MASS INDEX: 30.48 KG/M2 | WEIGHT: 225 LBS | DIASTOLIC BLOOD PRESSURE: 84 MMHG | RESPIRATION RATE: 20 BRPM | HEART RATE: 76 BPM | SYSTOLIC BLOOD PRESSURE: 150 MMHG | HEIGHT: 72 IN | OXYGEN SATURATION: 97 %

## 2024-07-05 DIAGNOSIS — N30.01 ACUTE CYSTITIS WITH HEMATURIA: Primary | ICD-10-CM

## 2024-07-05 DIAGNOSIS — R31.9 HEMATURIA, UNSPECIFIED TYPE: ICD-10-CM

## 2024-07-05 LAB
BACTERIA URNS QL MICRO: ABNORMAL
BILIRUB UR QL STRIP: ABNORMAL
CHARACTER UR: ABNORMAL
CLARITY UR: ABNORMAL
COLOR UR: ABNORMAL
EPI CELLS #/AREA URNS HPF: ABNORMAL /HPF (ref 0–5)
GLUCOSE UR STRIP-MCNC: NEGATIVE MG/DL
HGB UR QL STRIP.AUTO: ABNORMAL
KETONES UR STRIP-MCNC: ABNORMAL MG/DL
LEUKOCYTE ESTERASE UR QL STRIP: ABNORMAL
NITRITE UR QL STRIP: POSITIVE
PH UR STRIP: 6 [PH] (ref 5–6)
PROT UR STRIP-MCNC: ABNORMAL MG/DL
RBC #/AREA URNS HPF: ABNORMAL /HPF (ref 0–4)
SP GR UR STRIP: 1.02 (ref 1.01–1.02)
UROBILINOGEN UR STRIP-ACNC: NORMAL EU/DL (ref 0–1)
WBC #/AREA URNS HPF: ABNORMAL /HPF (ref 0–4)

## 2024-07-05 PROCEDURE — 81001 URINALYSIS AUTO W/SCOPE: CPT

## 2024-07-05 PROCEDURE — 99203 OFFICE O/P NEW LOW 30 MIN: CPT | Performed by: FAMILY MEDICINE

## 2024-07-05 PROCEDURE — 87086 URINE CULTURE/COLONY COUNT: CPT

## 2024-07-05 PROCEDURE — 1036F TOBACCO NON-USER: CPT | Performed by: FAMILY MEDICINE

## 2024-07-05 PROCEDURE — 3079F DIAST BP 80-89 MM HG: CPT | Performed by: FAMILY MEDICINE

## 2024-07-05 PROCEDURE — G8427 DOCREV CUR MEDS BY ELIG CLIN: HCPCS | Performed by: FAMILY MEDICINE

## 2024-07-05 PROCEDURE — G8417 CALC BMI ABV UP PARAM F/U: HCPCS | Performed by: FAMILY MEDICINE

## 2024-07-05 PROCEDURE — 1123F ACP DISCUSS/DSCN MKR DOCD: CPT | Performed by: FAMILY MEDICINE

## 2024-07-05 PROCEDURE — 3077F SYST BP >= 140 MM HG: CPT | Performed by: FAMILY MEDICINE

## 2024-07-05 PROCEDURE — 3017F COLORECTAL CA SCREEN DOC REV: CPT | Performed by: FAMILY MEDICINE

## 2024-07-05 RX ORDER — CIPROFLOXACIN 500 MG/1
500 TABLET, FILM COATED ORAL 2 TIMES DAILY
Qty: 14 TABLET | Refills: 0 | Status: SHIPPED | OUTPATIENT
Start: 2024-07-05

## 2024-07-05 ASSESSMENT — PATIENT HEALTH QUESTIONNAIRE - PHQ9
SUM OF ALL RESPONSES TO PHQ QUESTIONS 1-9: 0
1. LITTLE INTEREST OR PLEASURE IN DOING THINGS: NOT AT ALL
2. FEELING DOWN, DEPRESSED OR HOPELESS: NOT AT ALL
SUM OF ALL RESPONSES TO PHQ9 QUESTIONS 1 & 2: 0

## 2024-07-05 ASSESSMENT — ENCOUNTER SYMPTOMS
COUGH: 0
NAUSEA: 0
CHEST TIGHTNESS: 0
WHEEZING: 0
SHORTNESS OF BREATH: 0
ABDOMINAL PAIN: 0

## 2024-07-05 NOTE — PROGRESS NOTES
[facility]  [address]  [place] [address]  Dept: [phone]  Dept Fax: [phone]    Steve Alejo is a 74 y.o. male who presents today for his medical conditions/complaints as noted below.  Steve Alejo is c/o of   Chief Complaint   Patient presents with    Hematuria     Pt reports some trouble urinating about 1 week, going small amounts more frequently, not sure if bladder is emptying, blood in urine noticed today.        HPI:     Here today for hematuria.     Hematuria  This is a new problem. The current episode started in the past 7 days. The problem has been gradually worsening since onset. He describes the hematuria as gross hematuria. The hematuria occurs throughout his entire urinary stream. He reports no clotting in his urine stream. His pain is at a severity of 3/10. The pain is mild. He describes his urine color as dark red. Irritative symptoms include frequency, nocturia and urgency. Obstructive symptoms include dribbling, incomplete emptying and a weak stream. Obstructive symptoms do not include straining. Associated symptoms include chills, dysuria, fever, hesitancy and urinary retention. Pertinent negatives include no abdominal pain, flank pain or nausea. His past medical history is significant for BPH. There is no history of  trauma.         Past Medical History:   Diagnosis Date    Anemia     minimal anemia, hemoglobin 13.6    Benign prostatic hypertrophy     Cyst in hand     distal phalanx, mid finger left hand    Hyperlipidemia     Hypertension     Keratosis     right forehead    Overweight(278.02)     Seborrhea     Type II or unspecified type diabetes mellitus without mention of complication, not stated as uncontrolled           Social History     Tobacco Use    Smoking status: Former     Current packs/day: 0.00     Average packs/day: 3.0

## 2024-07-07 LAB
MICROORGANISM SPEC CULT: ABNORMAL
SPECIMEN DESCRIPTION: ABNORMAL

## 2024-07-08 LAB
MICROORGANISM SPEC CULT: ABNORMAL
SPECIMEN DESCRIPTION: ABNORMAL

## 2024-08-22 ENCOUNTER — OFFICE VISIT (OUTPATIENT)
Dept: PODIATRY | Age: 74
End: 2024-08-22
Payer: MEDICARE

## 2024-08-22 VITALS
SYSTOLIC BLOOD PRESSURE: 128 MMHG | HEART RATE: 64 BPM | BODY MASS INDEX: 31.41 KG/M2 | DIASTOLIC BLOOD PRESSURE: 74 MMHG | WEIGHT: 231.6 LBS | RESPIRATION RATE: 20 BRPM

## 2024-08-22 DIAGNOSIS — E11.42 DM TYPE 2 WITH DIABETIC PERIPHERAL NEUROPATHY (HCC): Primary | ICD-10-CM

## 2024-08-22 DIAGNOSIS — L84 CORNS AND CALLOSITIES: ICD-10-CM

## 2024-08-22 DIAGNOSIS — B35.1 DERMATOPHYTOSIS OF NAIL: ICD-10-CM

## 2024-08-22 PROCEDURE — 11056 PARNG/CUTG B9 HYPRKR LES 2-4: CPT | Performed by: PODIATRIST

## 2024-08-22 PROCEDURE — 11720 DEBRIDE NAIL 1-5: CPT | Performed by: PODIATRIST

## 2024-08-22 NOTE — PROGRESS NOTES
Foot Care Worksheet  PCP: Ryan Aleman MD  Last visit: 03 / 07 / 2024    Nail description: Thick , Yellow , Crumbly , Marked limitation of ambulation     Pain resulting from thickened and dystrophy of nail plate No    Nails involved  Right   1,5  (T5-T9)  Left    1,5    (TA-T4)    Q7 1 Class A Finding - Non traumatic amputation of foot No    Q8 2 Class B Findings - Absent DP pulse No, Absent PT pulse No, Advanced tropic changes (3 required) Yes    Decrease hair growth Yes, Nail changes/thickening Yes, Pigmented changes/discoloration Yes, Skin texture (thin, shiny) No, Skin color (rubor/redness) No    Q9 1 Class B and 2 Class C Findings  Claudication No, Temperature change Yes, Paresthesia Yes, Burning No, Edema Yes    Subjective:  Patient presents to [facility] today for routine diabetic foot care.   Patient's diabetic control has been not changed.    No Known Allergies    Past Medical History:   Diagnosis Date    Anemia     minimal anemia, hemoglobin 13.6    Benign prostatic hypertrophy     Cyst in hand     distal phalanx, mid finger left hand    Hyperlipidemia     Hypertension     Keratosis     right forehead    Overweight(278.02)     Seborrhea     Type II or unspecified type diabetes mellitus without mention of complication, not stated as uncontrolled        Prior to Admission medications    Medication Sig Start Date End Date Taking? Authorizing Provider   ciprofloxacin (CIPRO) 500 MG tablet Take 1 tablet by mouth 2 times daily 7/5/24  Yes Klarissa Patton MD   glimepiride (AMARYL) 2 MG tablet Take 1 tablet by mouth every morning (before breakfast) 6/3/24  Yes Keke Bright APRN - CNP   lisinopril (PRINIVIL;ZESTRIL) 10 MG tablet Take 1 tablet by mouth daily 5/20/24  Yes Keke Bright APRN - CNP   metFORMIN (GLUCOPHAGE-XR) 500 MG extended release tablet Take 2 tablets by mouth 2 times daily 3/7/24  Yes Keke Bright APRN - CNP   blood glucose monitor strips 50 strips by Other route

## 2024-09-05 ENCOUNTER — HOSPITAL ENCOUNTER (OUTPATIENT)
Age: 74
Discharge: HOME | End: 2024-09-05
Payer: MEDICARE

## 2024-09-05 DIAGNOSIS — E11.9 TYPE 2 DIABETES MELLITUS WITHOUT COMPLICATION, WITHOUT LONG-TERM CURRENT USE OF INSULIN (HCC): ICD-10-CM

## 2024-09-05 DIAGNOSIS — Z12.5 SPECIAL SCREENING FOR MALIGNANT NEOPLASM OF PROSTATE: ICD-10-CM

## 2024-09-05 DIAGNOSIS — E78.49 OTHER HYPERLIPIDEMIA: ICD-10-CM

## 2024-09-05 DIAGNOSIS — D50.9 IRON DEFICIENCY ANEMIA, UNSPECIFIED IRON DEFICIENCY ANEMIA TYPE: ICD-10-CM

## 2024-09-05 DIAGNOSIS — I10 PRIMARY HYPERTENSION: ICD-10-CM

## 2024-09-05 LAB
ALBUMIN SERPL-MCNC: 4.1 G/DL (ref 3.5–5.2)
ALBUMIN/GLOB SERPL: 1.8 {RATIO} (ref 1–2.5)
ALP SERPL-CCNC: 55 U/L (ref 40–129)
ALT SERPL-CCNC: 17 U/L (ref 5–41)
ANION GAP SERPL CALCULATED.3IONS-SCNC: 7 MMOL/L (ref 9–17)
AST SERPL-CCNC: 15 U/L
BILIRUB SERPL-MCNC: 0.5 MG/DL (ref 0.3–1.2)
BUN SERPL-MCNC: 15 MG/DL (ref 8–23)
BUN/CREAT SERPL: 15 (ref 9–20)
CALCIUM SERPL-MCNC: 8.8 MG/DL (ref 8.6–10.4)
CHLORIDE SERPL-SCNC: 105 MMOL/L (ref 98–107)
CHOLEST SERPL-MCNC: 200 MG/DL (ref 0–199)
CHOLESTEROL/HDL RATIO: 6
CO2 SERPL-SCNC: 29 MMOL/L (ref 20–31)
CREAT SERPL-MCNC: 1 MG/DL (ref 0.7–1.2)
CREAT UR-MCNC: 113 MG/DL (ref 39–259)
EST. AVERAGE GLUCOSE BLD GHB EST-MCNC: 143 MG/DL
GFR, ESTIMATED: 79 ML/MIN/1.73M2
GLUCOSE SERPL-MCNC: 138 MG/DL (ref 70–99)
HBA1C MFR BLD: 6.6 % (ref 4–6)
HDLC SERPL-MCNC: 33 MG/DL
HGB BLD-MCNC: 14 G/DL (ref 13–17)
IRON SATN MFR SERPL: 40 % (ref 20–55)
IRON SERPL-MCNC: 114 UG/DL (ref 61–157)
LDLC SERPL CALC-MCNC: 134 MG/DL (ref 0–100)
MICROALBUMIN UR-MCNC: 30 MG/L (ref 0–20)
MICROALBUMIN/CREAT UR-RTO: 27 MCG/MG CREAT (ref 0–17)
POTASSIUM SERPL-SCNC: 4.6 MMOL/L (ref 3.7–5.3)
PROT SERPL-MCNC: 6.4 G/DL (ref 6.4–8.3)
SODIUM SERPL-SCNC: 141 MMOL/L (ref 135–144)
TIBC SERPL-MCNC: 285 UG/DL (ref 250–450)
TRIGL SERPL-MCNC: 168 MG/DL (ref 0–149)
UNSATURATED IRON BINDING CAPACITY: 171 UG/DL (ref 112–347)
VLDLC SERPL CALC-MCNC: 34 MG/DL

## 2024-09-05 PROCEDURE — 80061 LIPID PANEL: CPT

## 2024-09-05 PROCEDURE — 83540 ASSAY OF IRON: CPT

## 2024-09-05 PROCEDURE — 36415 COLL VENOUS BLD VENIPUNCTURE: CPT

## 2024-09-05 PROCEDURE — 83036 HEMOGLOBIN GLYCOSYLATED A1C: CPT

## 2024-09-05 PROCEDURE — 82570 ASSAY OF URINE CREATININE: CPT

## 2024-09-05 PROCEDURE — 80053 COMPREHEN METABOLIC PANEL: CPT

## 2024-09-05 PROCEDURE — G0103 PSA SCREENING: HCPCS

## 2024-09-05 PROCEDURE — 85018 HEMOGLOBIN: CPT

## 2024-09-05 PROCEDURE — 83550 IRON BINDING TEST: CPT

## 2024-09-05 PROCEDURE — 82043 UR ALBUMIN QUANTITATIVE: CPT

## 2024-09-06 LAB — PSA SERPL-MCNC: 6.8 NG/ML (ref 0–4)

## 2024-09-12 ENCOUNTER — OFFICE VISIT (OUTPATIENT)
Dept: DIABETES SERVICES | Age: 74
End: 2024-09-12
Payer: MEDICARE

## 2024-09-12 ENCOUNTER — HOSPITAL ENCOUNTER (OUTPATIENT)
Age: 74
Discharge: HOME | End: 2024-09-12
Payer: MEDICARE

## 2024-09-12 ENCOUNTER — OFFICE VISIT (OUTPATIENT)
Dept: INTERNAL MEDICINE | Age: 74
End: 2024-09-12
Payer: MEDICARE

## 2024-09-12 VITALS
HEART RATE: 64 BPM | WEIGHT: 231 LBS | SYSTOLIC BLOOD PRESSURE: 136 MMHG | DIASTOLIC BLOOD PRESSURE: 72 MMHG | HEIGHT: 72 IN | BODY MASS INDEX: 31.29 KG/M2

## 2024-09-12 VITALS
BODY MASS INDEX: 31.37 KG/M2 | HEIGHT: 72 IN | SYSTOLIC BLOOD PRESSURE: 136 MMHG | WEIGHT: 231.6 LBS | HEART RATE: 64 BPM | DIASTOLIC BLOOD PRESSURE: 72 MMHG | OXYGEN SATURATION: 100 %

## 2024-09-12 DIAGNOSIS — E78.49 OTHER HYPERLIPIDEMIA: ICD-10-CM

## 2024-09-12 DIAGNOSIS — R97.20 ELEVATED PSA: ICD-10-CM

## 2024-09-12 DIAGNOSIS — N30.00 ACUTE CYSTITIS WITHOUT HEMATURIA: ICD-10-CM

## 2024-09-12 DIAGNOSIS — I10 PRIMARY HYPERTENSION: Primary | ICD-10-CM

## 2024-09-12 DIAGNOSIS — E11.9 TYPE 2 DIABETES MELLITUS WITHOUT COMPLICATION, WITHOUT LONG-TERM CURRENT USE OF INSULIN (HCC): ICD-10-CM

## 2024-09-12 DIAGNOSIS — R31.0 GROSS HEMATURIA: ICD-10-CM

## 2024-09-12 DIAGNOSIS — I10 ESSENTIAL HYPERTENSION: ICD-10-CM

## 2024-09-12 DIAGNOSIS — E11.9 TYPE 2 DIABETES MELLITUS WITHOUT COMPLICATION, WITHOUT LONG-TERM CURRENT USE OF INSULIN (HCC): Primary | ICD-10-CM

## 2024-09-12 LAB
BACTERIA URNS QL MICRO: ABNORMAL
BILIRUB UR QL STRIP: NEGATIVE
CHARACTER UR: ABNORMAL
CLARITY UR: CLEAR
COLOR UR: YELLOW
EPI CELLS #/AREA URNS HPF: ABNORMAL /HPF (ref 0–5)
GLUCOSE UR STRIP-MCNC: NEGATIVE MG/DL
HGB UR QL STRIP.AUTO: NEGATIVE
KETONES UR STRIP-MCNC: NEGATIVE MG/DL
LEUKOCYTE ESTERASE UR QL STRIP: ABNORMAL
NITRITE UR QL STRIP: NEGATIVE
PH UR STRIP: 6 [PH] (ref 5–6)
PROT UR STRIP-MCNC: NEGATIVE MG/DL
RBC #/AREA URNS HPF: ABNORMAL /HPF (ref 0–4)
SP GR UR STRIP: 1.01 (ref 1.01–1.02)
UROBILINOGEN UR STRIP-ACNC: NORMAL EU/DL (ref 0–1)
WBC #/AREA URNS HPF: ABNORMAL /HPF (ref 0–4)

## 2024-09-12 PROCEDURE — G2211 COMPLEX E/M VISIT ADD ON: HCPCS | Performed by: NURSE PRACTITIONER

## 2024-09-12 PROCEDURE — 99214 OFFICE O/P EST MOD 30 MIN: CPT | Performed by: NURSE PRACTITIONER

## 2024-09-12 PROCEDURE — 1123F ACP DISCUSS/DSCN MKR DOCD: CPT | Performed by: NURSE PRACTITIONER

## 2024-09-12 PROCEDURE — 3044F HG A1C LEVEL LT 7.0%: CPT | Performed by: INTERNAL MEDICINE

## 2024-09-12 PROCEDURE — G8417 CALC BMI ABV UP PARAM F/U: HCPCS | Performed by: INTERNAL MEDICINE

## 2024-09-12 PROCEDURE — 3078F DIAST BP <80 MM HG: CPT | Performed by: INTERNAL MEDICINE

## 2024-09-12 PROCEDURE — 2022F DILAT RTA XM EVC RTNOPTHY: CPT | Performed by: NURSE PRACTITIONER

## 2024-09-12 PROCEDURE — 3075F SYST BP GE 130 - 139MM HG: CPT | Performed by: INTERNAL MEDICINE

## 2024-09-12 PROCEDURE — G8427 DOCREV CUR MEDS BY ELIG CLIN: HCPCS | Performed by: NURSE PRACTITIONER

## 2024-09-12 PROCEDURE — 99212 OFFICE O/P EST SF 10 MIN: CPT | Performed by: INTERNAL MEDICINE

## 2024-09-12 PROCEDURE — 1036F TOBACCO NON-USER: CPT | Performed by: NURSE PRACTITIONER

## 2024-09-12 PROCEDURE — 3044F HG A1C LEVEL LT 7.0%: CPT | Performed by: NURSE PRACTITIONER

## 2024-09-12 PROCEDURE — 99212 OFFICE O/P EST SF 10 MIN: CPT | Performed by: NURSE PRACTITIONER

## 2024-09-12 PROCEDURE — G8427 DOCREV CUR MEDS BY ELIG CLIN: HCPCS | Performed by: INTERNAL MEDICINE

## 2024-09-12 PROCEDURE — 99214 OFFICE O/P EST MOD 30 MIN: CPT | Performed by: INTERNAL MEDICINE

## 2024-09-12 PROCEDURE — 3017F COLORECTAL CA SCREEN DOC REV: CPT | Performed by: NURSE PRACTITIONER

## 2024-09-12 PROCEDURE — 3075F SYST BP GE 130 - 139MM HG: CPT | Performed by: NURSE PRACTITIONER

## 2024-09-12 PROCEDURE — 1123F ACP DISCUSS/DSCN MKR DOCD: CPT | Performed by: INTERNAL MEDICINE

## 2024-09-12 PROCEDURE — 81001 URINALYSIS AUTO W/SCOPE: CPT

## 2024-09-12 PROCEDURE — 2022F DILAT RTA XM EVC RTNOPTHY: CPT | Performed by: INTERNAL MEDICINE

## 2024-09-12 PROCEDURE — 1036F TOBACCO NON-USER: CPT | Performed by: INTERNAL MEDICINE

## 2024-09-12 PROCEDURE — 3078F DIAST BP <80 MM HG: CPT | Performed by: NURSE PRACTITIONER

## 2024-09-12 PROCEDURE — 3017F COLORECTAL CA SCREEN DOC REV: CPT | Performed by: INTERNAL MEDICINE

## 2024-09-12 PROCEDURE — G8417 CALC BMI ABV UP PARAM F/U: HCPCS | Performed by: NURSE PRACTITIONER

## 2024-09-12 RX ORDER — ROSUVASTATIN CALCIUM 10 MG/1
10 TABLET, COATED ORAL DAILY
Qty: 90 TABLET | Refills: 3 | Status: SHIPPED | OUTPATIENT
Start: 2024-09-12

## 2024-09-12 ASSESSMENT — ENCOUNTER SYMPTOMS
NAUSEA: 0
ABDOMINAL PAIN: 0
EYE PAIN: 0
ABDOMINAL PAIN: 0
RESPIRATORY NEGATIVE: 1
COUGH: 0
CONSTIPATION: 0
SHORTNESS OF BREATH: 0
DIARRHEA: 0
BLOOD IN STOOL: 0
DIARRHEA: 0
BACK PAIN: 0
SHORTNESS OF BREATH: 0
VOMITING: 0

## 2024-10-28 ENCOUNTER — OFFICE VISIT (OUTPATIENT)
Dept: UROLOGY | Age: 74
End: 2024-10-28
Payer: MEDICARE

## 2024-10-28 VITALS
HEIGHT: 72 IN | OXYGEN SATURATION: 100 % | RESPIRATION RATE: 16 BRPM | SYSTOLIC BLOOD PRESSURE: 138 MMHG | WEIGHT: 230 LBS | DIASTOLIC BLOOD PRESSURE: 84 MMHG | BODY MASS INDEX: 31.15 KG/M2 | HEART RATE: 64 BPM

## 2024-10-28 DIAGNOSIS — R31.0 GROSS HEMATURIA: ICD-10-CM

## 2024-10-28 DIAGNOSIS — R97.20 ELEVATED PSA: Primary | ICD-10-CM

## 2024-10-28 DIAGNOSIS — N40.1 BENIGN LOCALIZED PROSTATIC HYPERPLASIA WITH LOWER URINARY TRACT SYMPTOMS (LUTS): ICD-10-CM

## 2024-10-28 PROCEDURE — 3017F COLORECTAL CA SCREEN DOC REV: CPT | Performed by: UROLOGY

## 2024-10-28 PROCEDURE — G8484 FLU IMMUNIZE NO ADMIN: HCPCS | Performed by: UROLOGY

## 2024-10-28 PROCEDURE — 3079F DIAST BP 80-89 MM HG: CPT | Performed by: UROLOGY

## 2024-10-28 PROCEDURE — 99204 OFFICE O/P NEW MOD 45 MIN: CPT | Performed by: UROLOGY

## 2024-10-28 PROCEDURE — 1159F MED LIST DOCD IN RCRD: CPT | Performed by: UROLOGY

## 2024-10-28 PROCEDURE — 99212 OFFICE O/P EST SF 10 MIN: CPT | Performed by: UROLOGY

## 2024-10-28 PROCEDURE — G8417 CALC BMI ABV UP PARAM F/U: HCPCS | Performed by: UROLOGY

## 2024-10-28 PROCEDURE — G8427 DOCREV CUR MEDS BY ELIG CLIN: HCPCS | Performed by: UROLOGY

## 2024-10-28 PROCEDURE — 1123F ACP DISCUSS/DSCN MKR DOCD: CPT | Performed by: UROLOGY

## 2024-10-28 PROCEDURE — 3075F SYST BP GE 130 - 139MM HG: CPT | Performed by: UROLOGY

## 2024-10-28 PROCEDURE — 1036F TOBACCO NON-USER: CPT | Performed by: UROLOGY

## 2024-10-28 RX ORDER — TAMSULOSIN HYDROCHLORIDE 0.4 MG/1
0.4 CAPSULE ORAL DAILY
Qty: 90 CAPSULE | Refills: 3 | Status: SHIPPED | OUTPATIENT
Start: 2024-10-28 | End: 2025-01-26

## 2024-10-28 NOTE — PROGRESS NOTES
Randy Martinez MD.    [facility]  [facility] A DEPARTMENT OF [facility]  [address] [facility] [address]  Dept: [phone]  Dept Fax: [phone]    [facility] Urology Office Note -     Patient:  Steve Alejo  YOB: 1950    The patient is a 74 y.o. male who presents today for evaluation of the following problems:   Chief Complaint   Patient presents with    New Patient    Elevated PSA     PSA 6.8    Hematuria     Seen in  for this, given antibiotic    Urinary Frequency     At nighttime, states he gets up every couple hours at night but he has done that for years    referred/consultation requested by Ryan Aleman MD.    History of Present Illness:    Elevated psa  One month prior to elevation had UTI  Had gross hematuria with infection    BPH  Worsening freq    Gross hematuria  One time episode  Painless but was in setting of infection  Former smoker    Requested/reviewed records from Ryan Aleman MD office and/or outside physician/[facility]    (Patient's old records have been requested, reviewed and pertinent findings summarized in today's note.)    Procedures Today: N/A      Last several PSA's:  Lab Results   Component Value Date    PSA 6.80 (H) 09/05/2024    PSA 1.08 08/31/2023    PSA 1.06 08/25/2022       Last total testosterone:  No results found for: \"TESTOSTERONE\"    Urinalysis today:  No results found for this visit on 10/28/24.    Last BUN and creatinine:  Lab Results   Component Value Date    BUN 15 09/05/2024     Lab Results   Component Value Date    CREATININE 1.0 09/05/2024         Imaging Reviewed during this Office Visit:   RANDY MARTINEZ MD independently reviewed the images and verified the radiology reports from:    No results found.    PAST MEDICAL, FAMILY AND SOCIAL HISTORY:  Past Medical History:   Diagnosis Date    Anemia     minimal anemia, hemoglobin 13.6    Benign prostatic

## 2024-11-01 ENCOUNTER — OFFICE VISIT (OUTPATIENT)
Dept: PODIATRY | Age: 74
End: 2024-11-01
Payer: MEDICARE

## 2024-11-01 VITALS
SYSTOLIC BLOOD PRESSURE: 134 MMHG | RESPIRATION RATE: 20 BRPM | DIASTOLIC BLOOD PRESSURE: 72 MMHG | HEART RATE: 76 BPM | WEIGHT: 234 LBS | BODY MASS INDEX: 31.74 KG/M2

## 2024-11-01 DIAGNOSIS — E11.42 DM TYPE 2 WITH DIABETIC PERIPHERAL NEUROPATHY (HCC): Primary | ICD-10-CM

## 2024-11-01 DIAGNOSIS — L84 CORNS AND CALLOSITIES: ICD-10-CM

## 2024-11-01 DIAGNOSIS — B35.1 DERMATOPHYTOSIS OF NAIL: ICD-10-CM

## 2024-11-01 PROCEDURE — 11720 DEBRIDE NAIL 1-5: CPT | Performed by: PODIATRIST

## 2024-11-01 PROCEDURE — 11056 PARNG/CUTG B9 HYPRKR LES 2-4: CPT | Performed by: PODIATRIST

## 2024-11-01 NOTE — PROGRESS NOTES
Foot Care Worksheet  PCP: Ryan Aleman MD  Last visit: 09 / 12 / 2024    Nail description: Thick , Yellow , Crumbly , Marked limitation of ambulation     Pain resulting from thickened and dystrophy of nail plate No    Nails involved  Right   1,5  (T5-T9)  Left    1,5    (TA-T4)    Q7 1 Class A Finding - Non traumatic amputation of foot No    Q8 2 Class B Findings - Absent DP pulse No, Absent PT pulse No, Advanced tropic changes (3 required) Yes    Decrease hair growth Yes, Nail changes/thickening Yes, Pigmented changes/discoloration Yes, Skin texture (thin, shiny) No, Skin color (rubor/redness) No    Q9 1 Class B and 2 Class C Findings  Claudication No, Temperature change Yes, Paresthesia Yes, Burning No, Edema Yes    Subjective:  Patient presents to [facility] today for routine diabetic foot care.   Patient's diabetic control has been not changed.    No Known Allergies    Past Medical History:   Diagnosis Date    Anemia     minimal anemia, hemoglobin 13.6    Benign prostatic hypertrophy     Cyst in hand     distal phalanx, mid finger left hand    Hyperlipidemia     Hypertension     Keratosis     right forehead    Overweight(278.02)     Seborrhea     Type II or unspecified type diabetes mellitus without mention of complication, not stated as uncontrolled        Prior to Admission medications    Medication Sig Start Date End Date Taking? Authorizing Provider   tamsulosin (FLOMAX) 0.4 MG capsule Take 1 capsule by mouth daily 10/28/24 1/26/25 Yes Randy Martinez MD   rosuvastatin (CRESTOR) 10 MG tablet Take 1 tablet by mouth daily 9/12/24  Yes Ryan Aleman MD   glimepiride (AMARYL) 2 MG tablet Take 1 tablet by mouth every morning (before breakfast) 6/3/24  Yes Keke Bright APRN - CNP   lisinopril (PRINIVIL;ZESTRIL) 10 MG tablet Take 1 tablet by mouth daily 5/20/24  Yes Keke Bright APRN - CNP   metFORMIN (GLUCOPHAGE-XR) 500 MG extended release tablet Take 2 tablets by mouth 2 times daily

## 2024-11-21 ENCOUNTER — TELEPHONE (OUTPATIENT)
Dept: DIABETES SERVICES | Age: 74
End: 2024-11-21

## 2024-11-21 RX ORDER — SEMAGLUTIDE 1.34 MG/ML
INJECTION, SOLUTION SUBCUTANEOUS
Qty: 3 ADJUSTABLE DOSE PRE-FILLED PEN SYRINGE | Refills: 3 | Status: CANCELLED | OUTPATIENT
Start: 2024-11-21

## 2024-11-21 NOTE — TELEPHONE ENCOUNTER
Called and spoke with David. David does not know name of the mail order pharmacy at this time.  Patient is aware to stop glimepiride when ozempic starts.  Patient will call with mail order pharmacy after he speaks with his insurance company.

## 2024-11-21 NOTE — TELEPHONE ENCOUNTER
Patient stopped in regarding Ozempic, stating that he would like to start this medication and has spoken with you about it prior. Patient stated phone number on file is correct - [phone]. Patient stated medication would be mail order; however, he is unsure of the name of mail order.

## 2024-11-21 NOTE — TELEPHONE ENCOUNTER
There are 3 mail order pharmacies on his list. Can we find out which one he wants. Then he will stop glimepiride when he starts ozempic.

## 2024-12-12 ENCOUNTER — HOSPITAL ENCOUNTER (OUTPATIENT)
Age: 74
Discharge: HOME | End: 2024-12-12
Payer: MEDICARE

## 2024-12-12 DIAGNOSIS — R97.20 ELEVATED PSA: ICD-10-CM

## 2024-12-12 DIAGNOSIS — E11.9 TYPE 2 DIABETES MELLITUS WITHOUT COMPLICATION, WITHOUT LONG-TERM CURRENT USE OF INSULIN (HCC): ICD-10-CM

## 2024-12-12 LAB
CREAT UR-MCNC: 127 MG/DL (ref 39–259)
EST. AVERAGE GLUCOSE BLD GHB EST-MCNC: 157 MG/DL
EST. AVERAGE GLUCOSE BLD GHB EST-MCNC: 157 MG/DL
HBA1C MFR BLD: 7.1 % (ref 4–6)
HBA1C MFR BLD: 7.1 % (ref 4–6)
MICROALBUMIN UR-MCNC: 45 MG/L (ref 0–20)
MICROALBUMIN/CREAT UR-RTO: 35 MCG/MG CREAT (ref 0–17)
PSA SERPL-MCNC: 3.8 NG/ML (ref 0–4)

## 2024-12-12 PROCEDURE — 84153 ASSAY OF PSA TOTAL: CPT

## 2024-12-12 PROCEDURE — 83036 HEMOGLOBIN GLYCOSYLATED A1C: CPT

## 2024-12-12 PROCEDURE — 82043 UR ALBUMIN QUANTITATIVE: CPT

## 2024-12-12 PROCEDURE — 82570 ASSAY OF URINE CREATININE: CPT

## 2024-12-19 ENCOUNTER — OFFICE VISIT (OUTPATIENT)
Dept: DIABETES SERVICES | Age: 74
End: 2024-12-19
Payer: MEDICARE

## 2024-12-19 ENCOUNTER — OFFICE VISIT (OUTPATIENT)
Dept: INTERNAL MEDICINE | Age: 74
End: 2024-12-19
Payer: MEDICARE

## 2024-12-19 VITALS
HEART RATE: 67 BPM | DIASTOLIC BLOOD PRESSURE: 84 MMHG | HEIGHT: 72 IN | SYSTOLIC BLOOD PRESSURE: 126 MMHG | WEIGHT: 231 LBS | OXYGEN SATURATION: 95 % | BODY MASS INDEX: 31.29 KG/M2

## 2024-12-19 VITALS
DIASTOLIC BLOOD PRESSURE: 84 MMHG | BODY MASS INDEX: 31.29 KG/M2 | HEIGHT: 72 IN | WEIGHT: 231 LBS | SYSTOLIC BLOOD PRESSURE: 126 MMHG | HEART RATE: 67 BPM | OXYGEN SATURATION: 95 % | RESPIRATION RATE: 16 BRPM

## 2024-12-19 DIAGNOSIS — D50.9 IRON DEFICIENCY ANEMIA, UNSPECIFIED IRON DEFICIENCY ANEMIA TYPE: ICD-10-CM

## 2024-12-19 DIAGNOSIS — E78.49 OTHER HYPERLIPIDEMIA: ICD-10-CM

## 2024-12-19 DIAGNOSIS — E11.9 TYPE 2 DIABETES MELLITUS WITHOUT COMPLICATION, WITHOUT LONG-TERM CURRENT USE OF INSULIN (HCC): ICD-10-CM

## 2024-12-19 DIAGNOSIS — I10 PRIMARY HYPERTENSION: Primary | ICD-10-CM

## 2024-12-19 DIAGNOSIS — I10 ESSENTIAL HYPERTENSION: ICD-10-CM

## 2024-12-19 DIAGNOSIS — E11.9 TYPE 2 DIABETES MELLITUS WITHOUT COMPLICATION, WITHOUT LONG-TERM CURRENT USE OF INSULIN (HCC): Primary | ICD-10-CM

## 2024-12-19 PROCEDURE — 99212 OFFICE O/P EST SF 10 MIN: CPT | Performed by: INTERNAL MEDICINE

## 2024-12-19 PROCEDURE — 3017F COLORECTAL CA SCREEN DOC REV: CPT | Performed by: INTERNAL MEDICINE

## 2024-12-19 PROCEDURE — G8427 DOCREV CUR MEDS BY ELIG CLIN: HCPCS | Performed by: INTERNAL MEDICINE

## 2024-12-19 PROCEDURE — 99214 OFFICE O/P EST MOD 30 MIN: CPT | Performed by: INTERNAL MEDICINE

## 2024-12-19 PROCEDURE — 1036F TOBACCO NON-USER: CPT | Performed by: INTERNAL MEDICINE

## 2024-12-19 PROCEDURE — 1159F MED LIST DOCD IN RCRD: CPT | Performed by: INTERNAL MEDICINE

## 2024-12-19 PROCEDURE — 3079F DIAST BP 80-89 MM HG: CPT | Performed by: INTERNAL MEDICINE

## 2024-12-19 PROCEDURE — 3074F SYST BP LT 130 MM HG: CPT | Performed by: INTERNAL MEDICINE

## 2024-12-19 PROCEDURE — 2022F DILAT RTA XM EVC RTNOPTHY: CPT | Performed by: INTERNAL MEDICINE

## 2024-12-19 PROCEDURE — 3051F HG A1C>EQUAL 7.0%<8.0%: CPT | Performed by: INTERNAL MEDICINE

## 2024-12-19 PROCEDURE — 1160F RVW MEDS BY RX/DR IN RCRD: CPT | Performed by: INTERNAL MEDICINE

## 2024-12-19 PROCEDURE — G8417 CALC BMI ABV UP PARAM F/U: HCPCS | Performed by: INTERNAL MEDICINE

## 2024-12-19 PROCEDURE — G8484 FLU IMMUNIZE NO ADMIN: HCPCS | Performed by: INTERNAL MEDICINE

## 2024-12-19 PROCEDURE — 1123F ACP DISCUSS/DSCN MKR DOCD: CPT | Performed by: INTERNAL MEDICINE

## 2024-12-19 PROCEDURE — 99212 OFFICE O/P EST SF 10 MIN: CPT | Performed by: NURSE PRACTITIONER

## 2024-12-19 ASSESSMENT — ENCOUNTER SYMPTOMS
VOMITING: 0
DIARRHEA: 0
SHORTNESS OF BREATH: 0
EYE PAIN: 0
RESPIRATORY NEGATIVE: 1
COUGH: 0
BACK PAIN: 0
ABDOMINAL PAIN: 0
CONSTIPATION: 0
NAUSEA: 0
SHORTNESS OF BREATH: 0
BLOOD IN STOOL: 0

## 2024-12-19 NOTE — PROGRESS NOTES
Speech normal.         Behavior: Behavior normal.         Thought Content: Thought content normal.         Judgment: Judgment normal.       Results  Laboratory Studies  Microalbumin urine test shows a protein level of 35. A1c was 7.1.    Hemoglobin A1C   Date Value Ref Range Status   12/12/2024 7.1 (H) 4.0 - 6.0 % Final      Results       ** No results found for the last 336 hours. **          Lab Results   Component Value Date/Time     09/05/2024 07:39 AM    K 4.6 09/05/2024 07:39 AM     09/05/2024 07:39 AM    CO2 29 09/05/2024 07:39 AM    BUN 15 09/05/2024 07:39 AM    CREATININE 1.0 09/05/2024 07:39 AM    GLUCOSE 138 09/05/2024 07:39 AM    CALCIUM 8.8 09/05/2024 07:39 AM    LABGLOM 79 09/05/2024 07:39 AM    LABGLOM >60 02/29/2024 07:32 AM      Lab Results   Component Value Date    CHOL 100 08/31/2023    TRIG 89 08/31/2023    HDL 33 (L) 09/05/2024     (H) 09/05/2024    VLDL 34 09/05/2024    CHOLHDLRATIO 6.0 09/05/2024     Lab Results   Component Value Date    ALT 17 09/05/2024    AST 15 09/05/2024    ALKPHOS 55 09/05/2024    BILITOT 0.5 09/05/2024             Attestation    The patient (or guardian, if applicable) and other individuals in attendance with the patient were advised that Artificial Intelligence will be utilized during this visit to record, process the conversation to generate a clinical note, and support improvement of the AI technology. The patient (or guardian, if applicable) and other individuals in attendance at the appointment consented to the use of AI, including the recording.          Electronically signed by AXEL Jeronimo CNP on 12/19/2024 at 8:14 AM      (Please note that portions of this note were completed with a voice-recognition program. Efforts were made to edit the dictation but occasionally words are mis-transcribed.)

## 2024-12-19 NOTE — PROGRESS NOTES
[facility]  [facility] INTER[facility] A DEPARTMENT OF [facility]  [address] [facility] [address]  Dept: [phone]  Dept Fax: [phone]  Loc: [phone]     Steve Alejo is a 74 y.o. male who presents today for his medical conditions/complaintsas noted below.  Steve Alejo is c/o of   Chief Complaint   Patient presents with    Hypertension    Hyperlipidemia    Diabetes         HPI:     Hypertension  This is a chronic problem. The current episode started more than 1 year ago. The problem has been waxing and waning since onset. The problem is controlled. Pertinent negatives include no chest pain, headaches, neck pain, palpitations or shortness of breath.   Hyperlipidemia  This is a chronic problem. The current episode started more than 1 year ago. The problem is controlled. Recent lipid tests were reviewed and are variable. Pertinent negatives include no chest pain or shortness of breath.   Diabetes  He presents for his follow-up diabetic visit. He has type 2 diabetes mellitus. The initial diagnosis of diabetes was made 14 years ago. His disease course has been fluctuating. Pertinent negatives for hypoglycemia include no confusion, dizziness, headaches, nervousness/anxiousness or pallor. Pertinent negatives for diabetes include no chest pain, no polydipsia, no polyuria and no weakness.       Hemoglobin A1C (%)   Date Value   12/12/2024 7.1 (H)   12/12/2024 7.1 (H)   09/05/2024 6.6 (H)            No components found for: \"LABMICR\"  No components found for: \"LDLCHOLESTEROL\", \"LDLCALC\"      AST (U/L)   Date Value   09/05/2024 15     ALT (U/L)   Date Value   09/05/2024 17     BUN (mg/dL)   Date Value   09/05/2024 15     BP Readings from Last 3 Encounters:   12/19/24 126/84   12/19/24 126/84   11/01/24 134/72              Past Medical History:   Diagnosis Date    Anemia     minimal anemia, hemoglobin 13.6    Benign prostatic hypertrophy     Cyst in hand     distal

## 2025-01-24 ENCOUNTER — HOSPITAL ENCOUNTER (OUTPATIENT)
Age: 75
Discharge: HOME | End: 2025-01-24
Payer: MEDICARE

## 2025-01-24 DIAGNOSIS — R97.20 ELEVATED PSA: ICD-10-CM

## 2025-01-24 LAB
BACTERIA URNS QL MICRO: ABNORMAL
BILIRUB UR QL STRIP: NEGATIVE
CHARACTER UR: ABNORMAL
CLARITY UR: ABNORMAL
COLOR UR: YELLOW
EPI CELLS #/AREA URNS HPF: ABNORMAL /HPF (ref 0–5)
GLUCOSE UR STRIP-MCNC: NEGATIVE MG/DL
HGB UR QL STRIP.AUTO: NEGATIVE
KETONES UR STRIP-MCNC: NEGATIVE MG/DL
LEUKOCYTE ESTERASE UR QL STRIP: ABNORMAL
NITRITE UR QL STRIP: POSITIVE
PH UR STRIP: 6 [PH] (ref 5–6)
PROT UR STRIP-MCNC: NEGATIVE MG/DL
PSA SERPL-MCNC: 2.16 NG/ML (ref 0–4)
RBC #/AREA URNS HPF: ABNORMAL /HPF (ref 0–4)
SP GR UR STRIP: 1.02 (ref 1.01–1.02)
UROBILINOGEN UR STRIP-ACNC: NORMAL EU/DL (ref 0–1)
WBC #/AREA URNS HPF: ABNORMAL /HPF (ref 0–4)

## 2025-01-24 PROCEDURE — 36415 COLL VENOUS BLD VENIPUNCTURE: CPT

## 2025-01-24 PROCEDURE — 84153 ASSAY OF PSA TOTAL: CPT

## 2025-01-24 PROCEDURE — 81001 URINALYSIS AUTO W/SCOPE: CPT

## 2025-01-24 PROCEDURE — 87086 URINE CULTURE/COLONY COUNT: CPT

## 2025-01-26 LAB
MICROORGANISM SPEC CULT: ABNORMAL
SPECIMEN DESCRIPTION: ABNORMAL

## 2025-01-28 LAB
MICROORGANISM SPEC CULT: ABNORMAL
SPECIMEN DESCRIPTION: ABNORMAL

## 2025-01-31 RX ORDER — GLUCOSAMINE HCL/CHONDROITIN SU 500-400 MG
50 CAPSULE ORAL DAILY
Qty: 100 STRIP | Refills: 5 | Status: SHIPPED | OUTPATIENT
Start: 2025-01-31

## 2025-01-31 NOTE — TELEPHONE ENCOUNTER
Steve called requesting a refill of the below medication which has been pended for you:     Requested Prescriptions     Pending Prescriptions Disp Refills    blood glucose monitor strips 100 strip 5     Si strips by Other route daily Test 1 times a day DX: E11.65       Last Appointment Date: Visit date not found  Next Appointment Date: Visit date not found    No Known Allergies

## 2025-02-05 RX ORDER — GLUCOSAMINE HCL/CHONDROITIN SU 500-400 MG
50 CAPSULE ORAL DAILY
Qty: 100 STRIP | Refills: 5 | Status: SHIPPED | OUTPATIENT
Start: 2025-02-05

## 2025-02-06 ENCOUNTER — OFFICE VISIT (OUTPATIENT)
Dept: UROLOGY | Age: 75
End: 2025-02-06
Payer: MEDICARE

## 2025-02-06 VITALS
OXYGEN SATURATION: 96 % | RESPIRATION RATE: 16 BRPM | HEART RATE: 68 BPM | WEIGHT: 234 LBS | HEIGHT: 72 IN | BODY MASS INDEX: 31.69 KG/M2 | DIASTOLIC BLOOD PRESSURE: 64 MMHG | SYSTOLIC BLOOD PRESSURE: 132 MMHG

## 2025-02-06 DIAGNOSIS — R97.20 ELEVATED PSA: ICD-10-CM

## 2025-02-06 DIAGNOSIS — N40.1 BENIGN LOCALIZED PROSTATIC HYPERPLASIA WITH LOWER URINARY TRACT SYMPTOMS (LUTS): ICD-10-CM

## 2025-02-06 DIAGNOSIS — R31.0 GROSS HEMATURIA: ICD-10-CM

## 2025-02-06 DIAGNOSIS — R33.9 INCOMPLETE BLADDER EMPTYING: Primary | ICD-10-CM

## 2025-02-06 PROCEDURE — 1159F MED LIST DOCD IN RCRD: CPT

## 2025-02-06 PROCEDURE — 99213 OFFICE O/P EST LOW 20 MIN: CPT

## 2025-02-06 PROCEDURE — 1036F TOBACCO NON-USER: CPT

## 2025-02-06 PROCEDURE — 1123F ACP DISCUSS/DSCN MKR DOCD: CPT

## 2025-02-06 PROCEDURE — 3017F COLORECTAL CA SCREEN DOC REV: CPT

## 2025-02-06 PROCEDURE — G8417 CALC BMI ABV UP PARAM F/U: HCPCS

## 2025-02-06 PROCEDURE — G8427 DOCREV CUR MEDS BY ELIG CLIN: HCPCS

## 2025-02-06 PROCEDURE — 51798 US URINE CAPACITY MEASURE: CPT

## 2025-02-06 PROCEDURE — 3075F SYST BP GE 130 - 139MM HG: CPT

## 2025-02-06 PROCEDURE — PBSHW MEAS,POST-VOID RES,US,NON-IMAGING

## 2025-02-06 PROCEDURE — 3078F DIAST BP <80 MM HG: CPT

## 2025-02-06 RX ORDER — TAMSULOSIN HYDROCHLORIDE 0.4 MG/1
0.4 CAPSULE ORAL DAILY
Qty: 90 CAPSULE | Refills: 3 | Status: SHIPPED | OUTPATIENT
Start: 2025-02-06 | End: 2026-02-01

## 2025-02-06 NOTE — PROGRESS NOTES
[facility]  [address] [facility] [address]  Dept: [phone]  Visit Date: 2/6/2025      HPI  Steve Alejo is a 74 y.o. male that presents to the urology clinic for BPH and PSA check.    Elevated PSA  False elevation x 1. Down on subsequent checks, 2.16 most recently. Nearing baseline.     BPH w/ LUTS  Worsening frequency and urgency. Nocturia x 1-2. Managed on Flomax 0.4 mg QD.     Gross hematuria  Single episode in the setting of UTI. Former smoker. No new episodes.    PSA Trend  2.16 (01/24/25)  3.80   (12/12/24)  6.80   (09/05/24) *False elevation.  1.08   (08/31/23)  1.06   (08/25/22)      Last BUN and Creatinine:  Lab Results   Component Value Date    BUN 15 09/05/2024     Lab Results   Component Value Date    CREATININE 1.0 09/05/2024           PAST MEDICAL, FAMILY AND SOCIAL HISTORY UPDATE:  Past Medical History:   Diagnosis Date    Anemia     minimal anemia, hemoglobin 13.6    Benign prostatic hypertrophy     Cyst in hand     distal phalanx, mid finger left hand    Hyperlipidemia     Hypertension     Keratosis     right forehead    Overweight(278.02)     Seborrhea     Type II or unspecified type diabetes mellitus without mention of complication, not stated as uncontrolled      Past Surgical History:   Procedure Laterality Date    COLONOSCOPY N/A 9/14/2020    diffuse severe diverticulosis, TA, hyperplastic polyp.  at [facility]     Family History   Problem Relation Age of Onset    Diabetes Mother     Cancer Father         throat cancer    Stroke Father     Cancer Brother 61        pancreatic    Cancer Paternal Uncle         lung     No outpatient medications have been marked as taking for the 2/6/25 encounter (Appointment) with Joseph Mcmanus PA-C.       Patient has no known allergies.  Social History     Tobacco Use   Smoking Status Former    Current packs/day: 0.00

## 2025-02-06 NOTE — PATIENT INSTRUCTIONS
PSA Trend  2.16 (01/24/25)  3.80   (12/12/24)  6.80   (09/05/24) *False elevation.  1.08   (08/31/23)  1.06   (08/25/22)

## 2025-03-17 NOTE — TELEPHONE ENCOUNTER
Steve called requesting a refill of the below medication which has been pended for you:     Requested Prescriptions      No prescriptions requested or ordered in this encounter       Last Appointment Date: 12/19/2024  Next Appointment Date: 4/3/2025    No Known Allergies

## 2025-03-18 RX ORDER — GLIMEPIRIDE 2 MG/1
2 TABLET ORAL
Qty: 90 TABLET | Refills: 1 | Status: SHIPPED | OUTPATIENT
Start: 2025-03-18

## 2025-03-18 RX ORDER — GLIMEPIRIDE 2 MG/1
2 TABLET ORAL
Qty: 90 TABLET | Refills: 1 | Status: SHIPPED | OUTPATIENT
Start: 2025-03-18 | End: 2025-03-18 | Stop reason: SDUPTHER

## 2025-03-18 NOTE — TELEPHONE ENCOUNTER
Patients prescription was sent to the wrong pharmacy can you send in to [facility] please and Thank you.

## 2025-03-24 ENCOUNTER — HOSPITAL ENCOUNTER (OUTPATIENT)
Age: 75
Discharge: HOME | End: 2025-03-24
Payer: MEDICARE

## 2025-03-24 DIAGNOSIS — E11.9 TYPE 2 DIABETES MELLITUS WITHOUT COMPLICATION, WITHOUT LONG-TERM CURRENT USE OF INSULIN: ICD-10-CM

## 2025-03-24 LAB
EST. AVERAGE GLUCOSE BLD GHB EST-MCNC: 157 MG/DL
HBA1C MFR BLD: 7.1 % (ref 4–6)

## 2025-03-24 PROCEDURE — 83036 HEMOGLOBIN GLYCOSYLATED A1C: CPT

## 2025-03-24 PROCEDURE — 36415 COLL VENOUS BLD VENIPUNCTURE: CPT

## 2025-03-25 ENCOUNTER — RESULTS FOLLOW-UP (OUTPATIENT)
Dept: FAMILY MEDICINE CLINIC | Age: 75
End: 2025-03-25

## 2025-04-03 ENCOUNTER — OFFICE VISIT (OUTPATIENT)
Dept: DIABETES SERVICES | Age: 75
End: 2025-04-03
Payer: MEDICARE

## 2025-04-03 VITALS
DIASTOLIC BLOOD PRESSURE: 76 MMHG | BODY MASS INDEX: 31.77 KG/M2 | SYSTOLIC BLOOD PRESSURE: 130 MMHG | OXYGEN SATURATION: 100 % | HEIGHT: 72 IN | HEART RATE: 66 BPM | WEIGHT: 234.6 LBS

## 2025-04-03 DIAGNOSIS — E78.49 OTHER HYPERLIPIDEMIA: ICD-10-CM

## 2025-04-03 DIAGNOSIS — I10 ESSENTIAL HYPERTENSION: ICD-10-CM

## 2025-04-03 DIAGNOSIS — E11.9 TYPE 2 DIABETES MELLITUS WITHOUT COMPLICATION, WITHOUT LONG-TERM CURRENT USE OF INSULIN: Primary | ICD-10-CM

## 2025-04-03 PROCEDURE — G2211 COMPLEX E/M VISIT ADD ON: HCPCS | Performed by: NURSE PRACTITIONER

## 2025-04-03 PROCEDURE — 99214 OFFICE O/P EST MOD 30 MIN: CPT | Performed by: NURSE PRACTITIONER

## 2025-04-03 PROCEDURE — 3078F DIAST BP <80 MM HG: CPT | Performed by: NURSE PRACTITIONER

## 2025-04-03 PROCEDURE — 1159F MED LIST DOCD IN RCRD: CPT | Performed by: NURSE PRACTITIONER

## 2025-04-03 PROCEDURE — 1036F TOBACCO NON-USER: CPT | Performed by: NURSE PRACTITIONER

## 2025-04-03 PROCEDURE — 1123F ACP DISCUSS/DSCN MKR DOCD: CPT | Performed by: NURSE PRACTITIONER

## 2025-04-03 PROCEDURE — G8417 CALC BMI ABV UP PARAM F/U: HCPCS | Performed by: NURSE PRACTITIONER

## 2025-04-03 PROCEDURE — 3017F COLORECTAL CA SCREEN DOC REV: CPT | Performed by: NURSE PRACTITIONER

## 2025-04-03 PROCEDURE — 3051F HG A1C>EQUAL 7.0%<8.0%: CPT | Performed by: NURSE PRACTITIONER

## 2025-04-03 PROCEDURE — 1160F RVW MEDS BY RX/DR IN RCRD: CPT | Performed by: NURSE PRACTITIONER

## 2025-04-03 PROCEDURE — 3075F SYST BP GE 130 - 139MM HG: CPT | Performed by: NURSE PRACTITIONER

## 2025-04-03 PROCEDURE — G8427 DOCREV CUR MEDS BY ELIG CLIN: HCPCS | Performed by: NURSE PRACTITIONER

## 2025-04-03 PROCEDURE — 2022F DILAT RTA XM EVC RTNOPTHY: CPT | Performed by: NURSE PRACTITIONER

## 2025-04-03 PROCEDURE — 99212 OFFICE O/P EST SF 10 MIN: CPT | Performed by: NURSE PRACTITIONER

## 2025-04-03 ASSESSMENT — ENCOUNTER SYMPTOMS
RESPIRATORY NEGATIVE: 1
SHORTNESS OF BREATH: 0

## 2025-04-03 NOTE — PROGRESS NOTES
Judgment normal.           Results  Labs   - A1c: 7.1   - Fasting blood sugar: 130s to 160s    Hemoglobin A1C   Date Value Ref Range Status   03/24/2025 7.1 (H) 4.0 - 6.0 % Final      Results       ** No results found for the last 336 hours. **          Lab Results   Component Value Date/Time     09/05/2024 07:39 AM    K 4.6 09/05/2024 07:39 AM     09/05/2024 07:39 AM    CO2 29 09/05/2024 07:39 AM    BUN 15 09/05/2024 07:39 AM    CREATININE 1.0 09/05/2024 07:39 AM    GLUCOSE 138 09/05/2024 07:39 AM    CALCIUM 8.8 09/05/2024 07:39 AM    LABGLOM 79 09/05/2024 07:39 AM    LABGLOM >60 02/29/2024 07:32 AM      Lab Results   Component Value Date    CHOL 100 08/31/2023    TRIG 89 08/31/2023    HDL 33 (L) 09/05/2024     (H) 09/05/2024    VLDL 34 09/05/2024    CHOLHDLRATIO 6.0 09/05/2024     Lab Results   Component Value Date    ALT 17 09/05/2024    AST 15 09/05/2024    ALKPHOS 55 09/05/2024    BILITOT 0.5 09/05/2024             Attestation    The patient (or guardian, if applicable) and other individuals in attendance with the patient were advised that Artificial Intelligence will be utilized during this visit to record, process the conversation to generate a clinical note, and support improvement of the AI technology. The patient (or guardian, if applicable) and other individuals in attendance at the appointment consented to the use of AI, including the recording.        Electronically signed by AXEL Jeronimo CNP on 4/3/2025 at 7:42 AM      (Please note that portions of this note were completed with a voice-recognition program. Efforts were made to edit the dictation but occasionally words are mis-transcribed.)

## 2025-04-18 ENCOUNTER — OFFICE VISIT (OUTPATIENT)
Dept: PODIATRY | Age: 75
End: 2025-04-18
Payer: MEDICARE

## 2025-04-18 VITALS
SYSTOLIC BLOOD PRESSURE: 124 MMHG | WEIGHT: 235.8 LBS | RESPIRATION RATE: 20 BRPM | DIASTOLIC BLOOD PRESSURE: 68 MMHG | HEART RATE: 64 BPM | BODY MASS INDEX: 31.98 KG/M2

## 2025-04-18 DIAGNOSIS — E11.42 DM TYPE 2 WITH DIABETIC PERIPHERAL NEUROPATHY (HCC): Primary | ICD-10-CM

## 2025-04-18 DIAGNOSIS — M72.2 PLANTAR FASCIITIS: ICD-10-CM

## 2025-04-18 DIAGNOSIS — L84 CORNS AND CALLOSITIES: ICD-10-CM

## 2025-04-18 DIAGNOSIS — B35.1 DERMATOPHYTOSIS OF NAIL: ICD-10-CM

## 2025-04-18 PROCEDURE — 11720 DEBRIDE NAIL 1-5: CPT | Performed by: PODIATRIST

## 2025-04-18 PROCEDURE — 11056 PARNG/CUTG B9 HYPRKR LES 2-4: CPT | Performed by: PODIATRIST

## 2025-04-18 NOTE — PROGRESS NOTES
Foot Care Worksheet  PCP: Ryan Aleman MD  Last visit: 12 / 19 / 2024    Nail description: Thick , Yellow , Crumbly , Marked limitation of ambulation     Pain resulting from thickened and dystrophy of nail plate No    Nails involved  Right   1,5  (T5-T9)  Left    1,5    (TA-T4)    Q7 1 Class A Finding - Non traumatic amputation of foot No    Q8 2 Class B Findings - Absent DP pulse No, Absent PT pulse No, Advanced tropic changes (3 required) Yes    Decrease hair growth Yes, Nail changes/thickening Yes, Pigmented changes/discoloration Yes, Skin texture (thin, shiny) No, Skin color (rubor/redness) No    Q9 1 Class B and 2 Class C Findings  Claudication No, Temperature change Yes, Paresthesia Yes, Burning No, Edema Yes    Subjective:  [facility] new issue of pain left heel.  Does feel off and on during the day but mainly at the end of a long day.  Pain is aching.  No trauma.  No change in shoe gear.  Patient also request diabetic foot care.   Patient's diabetic control has been not changed.    No Known Allergies    Past Medical History:   Diagnosis Date    Anemia     minimal anemia, hemoglobin 13.6    Benign prostatic hypertrophy     Cyst in hand     distal phalanx, mid finger left hand    Hyperlipidemia     Hypertension     Keratosis     right forehead    Overweight(278.02)     Seborrhea     Type II or unspecified type diabetes mellitus without mention of complication, not stated as uncontrolled        Prior to Admission medications    Medication Sig Start Date End Date Taking? Authorizing Provider   glimepiride (AMARYL) 2 MG tablet Take 1 tablet by mouth every morning (before breakfast) 3/18/25  Yes Keke Bright APRN - CNP   tamsulosin (FLOMAX) 0.4 MG capsule Take 1 capsule by mouth daily 2/6/25 2/1/26 Yes Joseph Mcmanus PA-C   blood glucose monitor strips 50 strips by Other route daily Test 1 times a day DX: E11.65 2/5/25  Yes Keke Bright APRN - CNP

## 2025-05-01 NOTE — PROGRESS NOTES
Foot Care Worksheet  PCP: Ryan Aleman MD  Last visit: 03 / 07 / 2024    Nail description: Thick , Yellow , Crumbly , Marked limitation of ambulation     Pain resulting from thickened and dystrophy of nail plate No    Nails involved  Right   1,5  (T5-T9)  Left    1,5    (TA-T4)    Q7 1 Class A Finding - Non traumatic amputation of foot No    Q8 2 Class B Findings - Absent DP pulse No, Absent PT pulse No, Advanced tropic changes (3 required) Yes    Decrease hair growth Yes, Nail changes/thickening Yes, Pigmented changes/discoloration Yes, Skin texture (thin, shiny) No, Skin color (rubor/redness) No    Q9 1 Class B and 2 Class C Findings  Claudication No, Temperature change Yes, Paresthesia Yes, Burning No, Edema Yes    Subjective:  Patient presents to [facility] today for routine diabetic foot care.   Patient's diabetic control has been not changed.    No Known Allergies    Past Medical History:   Diagnosis Date    Anemia     minimal anemia, hemoglobin 13.6    Benign prostatic hypertrophy     Cyst in hand     distal phalanx, mid finger left hand    Hyperlipidemia     Hypertension     Keratosis     right forehead    Overweight(278.02)     Seborrhea     Type II or unspecified type diabetes mellitus without mention of complication, not stated as uncontrolled        Prior to Admission medications    Medication Sig Start Date End Date Taking? Authorizing Provider   metFORMIN (GLUCOPHAGE-XR) 500 MG extended release tablet Take 2 tablets by mouth 2 times daily 3/7/24  Yes Keke Bright APRN - CNP   blood glucose monitor strips 50 strips by Other route daily Test 1 times a day DX: E11.65 3/7/24  Yes Keke Bright APRN - CNP   sildenafil (REVATIO) 20 MG tablet Take 1 tablet by mouth as needed (prn) 3/7/24  Yes Ryan Aleman MD   rosuvastatin (CRESTOR) 10 MG tablet Take 1 tablet by mouth daily 12/7/23  Yes Keke Bright APRN - CNP   lisinopril (PRINIVIL;ZESTRIL) 10 MG tablet Take 1 tablet by  [facility] encounter  HEMATOLOGY/ONCOLOGY OFFICE VISIT    CHIEF COMPLAINT:   Cancer      ONCOLOGIC HISTORY:  Malignant neoplasm of left breast in female, estrogen receptor negative 8/30/2024.  T2 N0 M0  ER negative, ER positive in 5% cells, HER2 negative.  Essentially triple negative.  Left axillary SLNB Negative 9/17/2024.  Carboplatin + Taxol + Pembrolizumab followed by AC + Pembrolizumab from 9/24/2024 to 3/6/2025.  MRI 3/17/2025 - significant reduction.  Bilateral mastectomy planned.  Single agent Pembrolizumab beginning 3/27/2025.  Bilateral mastectomy 4/16/2025.  Path [id number]    HISTORY OF PRESENT ILLNESS:     The patient is a 65-year-old female who presents today for evaluation and treatment of T2N0M0 triple-negative breast cancer, status post neoadjuvant chemoimmunotherapy followed by bilateral mastectomy on 04/16/2025.    Satisfactory healing progress is reported, although significant bruising and persistent numbness continue to be experienced. It has been communicated that the numbness is a permanent condition. No reconstructive procedures have been performed, and consultation with a plastic surgeon is planned after the completion of the current treatment regimen.      RESULTS     Labs   - Blood Glucose: 05/01/2025, 123   - Sodium: 05/01/2025, Normal   - Potassium: 05/01/2025, Normal   - Kidney Enzymes: 05/01/2025, Normal   - Liver Enzymes: 05/01/2025, Normal   - White Count: 05/01/2025, 4.3   - Hemoglobin: 05/01/2025, 10.5   - Platelets: 05/01/2025, 189    ASSESS PLAN  ASSESSMENT: Malignant neoplasm of left breast in female, estrogen receptor negative, unspecified site of breast (CMD)  (primary encounter diagnosis)  Plan: CBC with Automated Differential, Comprehensive         Metabolic Panel    Encounter for antineoplastic chemotherapy and immunotherapy    Antineoplastic chemotherapy induced anemia           1. T2N0M0 triple-negative breast cancer.  Post neoadjuvant chemoimmunotherapy -  [id number]  The patient has shown an excellent response to neoadjuvant chemoimmunotherapy, with only 1 mm of residual cancer found in the left breast, which has been successfully excised during the bilateral mastectomy on 04/16/2025. Margins and lymph nodes were negative. The pattern of recurrences in triple-negative breast cancer typically occurs within the first 3 years, unlike estrogen receptor-positive breast cancer. The chances of cure are excellent. The treatment plan includes continuing with pembrolizumab 200 mg today, maintaining the same dosage as previously administered. The next dose is scheduled for 05/22/2025. Regular monitoring will be conducted to ensure no recurrence. Potential side effects of pembrolizumab include fatigue, rash, and diarrhea. Supportive care measures will be provided as needed.    2. Mild anemia.  Hemoglobin level is 10.5, indicating mild anemia, which is slightly improved from previous levels. It is expected to improve gradually as recovery from chemotherapy and surgery continues. No transfusion is needed at this time.    Follow-up  Follow-up is scheduled in 3 weeks.    PROCEDURE  Pembrolizumab 200 mg was administered today.              PAST HISTORIES:  Problem List           ICD-10-CM Noted       Oncology Problems    Malignant neoplasm of left breast in female, estrogen receptor negative (CMD) C50.912, Z17.1 9/16/2024          Cancer Staging Summary for Silvia Hart       Malignant neoplasm of left breast in female, estrogen receptor negative (CMD)       Stage Date Classification Stage Status    9/16/24 Clinical Stage IIB (cT2, cN0, cM0, G2, ER-, KS-, HER2-) Signed by Lucio Vidales MD on 9/16/24                    MEDICATIONS / ALLERGIES:  Current Outpatient Medications   Medication Sig Dispense Refill    predniSONE (DELTASONE) 20 MG tablet TAKE 1 TABLET BY MOUTH EVERY 12 HOURS X 1 WEEK      albuterol 108 (90 Base) MCG/ACT inhaler Inhale 2 puffs into the lungs.       doxycycline hyclate (VIBRAMYCIN) 100 MG capsule Take 1 capsule by mouth in the morning and 1 capsule in the evening. 30 capsule 0    escitalopram (LEXAPRO) 10 MG tablet TAKE 1 TABLET BY MOUTH EVERY DAY 90 tablet 1    pantoprazole (PROTONIX) 40 MG tablet Take 1 tablet by mouth in the morning and 1 tablet in the evening. 60 tablet 0    famotidine (PEPCID) 40 MG tablet Take 1 tablet by mouth nightly. 30 tablet 5    prochlorperazine (COMPAZINE) 10 MG tablet Take 1 tablet by mouth every 6 hours as needed for Nausea or Vomiting. 30 tablet 5    ondansetron (ZOFRAN ODT) 8 MG disintegrating tablet Place 1 tablet onto the tongue every 8 hours as needed for Nausea. 30 tablet 1    lidocaine-prilocaine (EMLA) 2.5-2.5 % cream Apply dime-sized amount over port 30 to 45 minutes prior to port access to numb the skin 30 g 1    fluconazole (DIFLUCAN) 100 MG tablet Take two tablets by mouth on day 1 followed by one tablet by mouth daily thereafter on days 2 through 10. 11 tablet 0    amLODIPine (NORVASC) 5 MG tablet Take 5 mg by mouth daily.      aspirin-acetaminophen-caffeine (EXCEDRIN MIGRAINE) 250-250-65 MG per tablet Take by mouth as needed.      losartan (COZAAR) 100 MG tablet Take 100 mg by mouth daily.      magnesium 250 MG tablet Take by mouth daily.      topiramate (TOPAMAX) 50 MG tablet Take by mouth 2 times daily.       No current facility-administered medications for this visit.     ALLERGIES:   Allergen Reactions    Hydrochlorothiazide Other (See Comments)    Pregabalin Other (See Comments)     Stated out of it,    Sucralfate SWELLING     Facial swelling        Review of systems:  Erika Schneider CMA  5/1/2025 11:59 AM  Sign when Signing Visit  Silvia Hart is a 65 year old female here for  No chief complaint on file.    Denies latex allergy or sensitivity.    Medication verified, no changes.  PCP and Pharmacy verified.    Social History     Tobacco Use   Smoking Status Every Day    Types: Cigarettes    Passive  exposure: Current   Smokeless Tobacco Never     Advance Directives Filed: No    ECOG:   ECOG   ECOG Performance Status        Vitals:    There were no vitals taken for this visit.    These vital signs are:  Within defined parameters (Per Reference \"Defined Limits Hospital Outpatient Department (HOD)\")    Height: No.  Ht Readings from Last 1 Encounters:   02/05/25 5' 1\" (1.549 m)     Weight:Yes, shoes off.  Wt Readings from Last 3 Encounters:   03/27/25 47.9 kg (105 lb 8 oz)   03/06/25 46.6 kg (102 lb 11.2 oz)   02/21/25 47.9 kg (105 lb 9.6 oz)       BMI: There is no height or weight on file to calculate BMI.    REVIEW OF SYSTEMS  GENERAL:  Patient denies headache, fevers, chills, night sweats, excessive fatigue, change in appetite, weight loss, dizziness  ALLERGIC/IMMUNOLOGIC: Verified allergies: Yes  EYES:  Patient denies significant visual difficulties, double vision, blurred vision  ENT/MOUTH: Patient denies problems with hearing, sore throat, sinus drainage, mouth sores  ENDOCRINE:  Patient denies diabetes, thyroid disease, hormone replacement, hot flashes  HEMATOLOGIC/LYMPHATIC: Patient denies easy bruising, bleeding, tender lymph nodes, swollen lymph nodes  BREASTS: Patient denies abnormal masses of breast, nipple discharge, pain  RESPIRATORY:  Patient denies lung pain with breathing, cough, coughing up blood, shortness of breath  CARDIOVASCULAR:  Patient denies anginal chest pain, palpitations, shortness of breath when lying flat, peripheral edema  GASTROINTESTINAL: Patient denies abdominal pain , nausea, vomiting, diarrhea, GI bleeding, constipation, change in bowel habits, heartburn, sensation of feeling full, difficulty swallowing  : Patient denies abnormal genital masses, blood in the urine, frequency, urgency, burning with urination, hesitancy, incontinence, vaginal bleeding, discharge  MUSCULOSKELETAL:  Patient denies joint pain, bone pain, joint swelling, redness, decreased range of motion  SKIN:   Patient denies chronic rashes, inflammation, ulcerations, skin changes, itching  NEUROLOGIC:  Patient denies loss of balance, areas of focal weakness, abnormal gait, sensory problems, numbness, tingling  PSYCHIATRIC: Patient denies insomnia, depression, anxiety    This patient reported abnormal symptoms that needed immediate verbal communication: No         PHYSICAL EXAM:  Vital Signs:   Oncology Encounter Vitals [05/01/25 1156]   ONC OP Encounter Vitals Group      /72      Heart Rate 78      Resp 16      Temp 97.7 °F (36.5 °C)      Temp src Temporal      SpO2 99 %      Weight 104 lb 14.4 oz (47.6 kg)      Height       Pain Score  0      Pain Location       Pain Education?       BSA (Calculated - m2) - Juan & Juan       BSA (Calculated - sq m)       BMI (Calculated)      ECOG Performance Status:   ECOG [05/01/25 1158]   ECOG Performance Status 0     General: The patient is alert, well-developed, well-nourished, no distress.  Skin: Warm, normal color, normal texture, normal turgor and without rash.  Head: Normocephalic, atraumatic.  Neck: Supple   Psychiatric: Cooperative. Appropriate mood and affect. Normal judgment.     Erythematous and tender mediport site noted on the integumentary system, no discharge or open areas.      There is 1+ pitting edema in the right lower extremity, no erythema, no warmth.      Breast: Bruising and numbness noted post bilateral mastectomy.    DATA REVIEWED:  Laboratory Data:  Recent Labs   Lab 05/01/25  1145   WBC 4.3   RBC 3.35*   HGB 10.5*   HCT 33.4*   MCV 99.7      Absolute Neutrophils 2.8   Absolute Lymphocytes 0.8*   Absolute Monocytes 0.3   Absolute Eosinophils  0.3   Absolute Basophils 0.1     Recent Labs   Lab 05/01/25  1145 12/10/24  1145 12/03/24  1007   Glucose 123*   < > 91   Sodium 141   < > 141   Potassium 4.0   < > 3.5   Chloride 107   < > 107   Carbon Dioxide 24   < > 24   BUN 17   < > 12   Creatinine 0.78   < > 0.79   Calcium 8.6   < > 8.4   Magnesium   --   --  1.8   Protein, Total 6.3*   < > 6.2*   Albumin 3.6   < > 3.6   GOT/AST 10   < > 14   Alkaline Phosphatase 78   < > 102   GPT 11   < > 12    < > = values in this interval not displayed.     Recent Labs   Lab 05/01/25  1145   Anion Gap 14   Globulin 2.7   Bilirubin, Total 0.2               The patient, sister(s) indicated understanding of the diagnosis and agreed with the plan of care. The patient is encouraged to call between now and next visit with any problems, questions or concerns that arise.

## 2025-05-06 DIAGNOSIS — I10 ESSENTIAL HYPERTENSION: ICD-10-CM

## 2025-05-06 RX ORDER — LISINOPRIL 10 MG/1
10 TABLET ORAL DAILY
Qty: 90 TABLET | Refills: 3 | Status: SHIPPED | OUTPATIENT
Start: 2025-05-06

## 2025-05-20 RX ORDER — GLUCOSAMINE HCL/CHONDROITIN SU 500-400 MG
50 CAPSULE ORAL DAILY
Qty: 100 STRIP | Refills: 5 | Status: SHIPPED | OUTPATIENT
Start: 2025-05-20

## 2025-05-20 NOTE — TELEPHONE ENCOUNTER
Refill Accuchek plus test strips  Changing to [facility] (has been out for 4 days d/t [place] not filling)  Next OV 07/17/25

## 2025-06-19 ENCOUNTER — HOSPITAL ENCOUNTER (OUTPATIENT)
Age: 75
Discharge: HOME | End: 2025-06-19
Payer: MEDICARE

## 2025-06-19 ENCOUNTER — RESULTS FOLLOW-UP (OUTPATIENT)
Dept: INTERNAL MEDICINE | Age: 75
End: 2025-06-19

## 2025-06-19 DIAGNOSIS — E78.49 OTHER HYPERLIPIDEMIA: ICD-10-CM

## 2025-06-19 DIAGNOSIS — D50.9 IRON DEFICIENCY ANEMIA, UNSPECIFIED IRON DEFICIENCY ANEMIA TYPE: ICD-10-CM

## 2025-06-19 DIAGNOSIS — I10 PRIMARY HYPERTENSION: ICD-10-CM

## 2025-06-19 LAB
ALBUMIN SERPL-MCNC: 4.3 G/DL (ref 3.5–5.2)
ALBUMIN/GLOB SERPL: 2 {RATIO} (ref 1–2.5)
ALP SERPL-CCNC: 55 U/L (ref 40–129)
ALT SERPL-CCNC: 22 U/L (ref 10–50)
ANION GAP SERPL CALCULATED.3IONS-SCNC: 10 MMOL/L (ref 9–16)
AST SERPL-CCNC: 18 U/L (ref 10–50)
BILIRUB SERPL-MCNC: 0.6 MG/DL (ref 0–1.2)
BUN SERPL-MCNC: 17 MG/DL (ref 8–23)
BUN/CREAT SERPL: 17 (ref 9–20)
CALCIUM SERPL-MCNC: 9.3 MG/DL (ref 8.6–10.4)
CHLORIDE SERPL-SCNC: 104 MMOL/L (ref 98–107)
CHOLEST SERPL-MCNC: 117 MG/DL (ref 0–199)
CHOLESTEROL/HDL RATIO: 3.7
CO2 SERPL-SCNC: 27 MMOL/L (ref 20–31)
CREAT SERPL-MCNC: 1 MG/DL (ref 0.7–1.2)
GFR, ESTIMATED: 79 ML/MIN/1.73M2
GLUCOSE SERPL-MCNC: 175 MG/DL (ref 74–99)
HDLC SERPL-MCNC: 32 MG/DL
HGB BLD-MCNC: 14 G/DL (ref 13–17)
IRON SATN MFR SERPL: 44 % (ref 20–55)
IRON SERPL-MCNC: 125 UG/DL (ref 61–157)
LDLC SERPL CALC-MCNC: 58 MG/DL (ref 0–100)
POTASSIUM SERPL-SCNC: 5.1 MMOL/L (ref 3.7–5.3)
PROT SERPL-MCNC: 6.5 G/DL (ref 6.6–8.7)
SODIUM SERPL-SCNC: 141 MMOL/L (ref 136–145)
TIBC SERPL-MCNC: 283 UG/DL (ref 250–450)
TRIGL SERPL-MCNC: 135 MG/DL (ref 0–149)
UNSATURATED IRON BINDING CAPACITY: 158 UG/DL (ref 112–347)
VLDLC SERPL CALC-MCNC: 27 MG/DL (ref 1–30)

## 2025-06-19 PROCEDURE — 80053 COMPREHEN METABOLIC PANEL: CPT

## 2025-06-19 PROCEDURE — 80061 LIPID PANEL: CPT

## 2025-06-19 PROCEDURE — 36415 COLL VENOUS BLD VENIPUNCTURE: CPT

## 2025-06-19 PROCEDURE — 83550 IRON BINDING TEST: CPT

## 2025-06-19 PROCEDURE — 83540 ASSAY OF IRON: CPT

## 2025-06-19 PROCEDURE — 85018 HEMOGLOBIN: CPT

## 2025-06-27 ENCOUNTER — OFFICE VISIT (OUTPATIENT)
Dept: INTERNAL MEDICINE | Age: 75
End: 2025-06-27
Payer: MEDICARE

## 2025-06-27 ENCOUNTER — TELEPHONE (OUTPATIENT)
Dept: SURGERY | Age: 75
End: 2025-06-27

## 2025-06-27 VITALS
HEART RATE: 64 BPM | HEIGHT: 72 IN | BODY MASS INDEX: 31.83 KG/M2 | RESPIRATION RATE: 16 BRPM | OXYGEN SATURATION: 96 % | WEIGHT: 235 LBS | DIASTOLIC BLOOD PRESSURE: 72 MMHG | SYSTOLIC BLOOD PRESSURE: 128 MMHG

## 2025-06-27 DIAGNOSIS — Z00.00 MEDICARE ANNUAL WELLNESS VISIT, SUBSEQUENT: Primary | ICD-10-CM

## 2025-06-27 DIAGNOSIS — G89.29 CHRONIC NECK PAIN: ICD-10-CM

## 2025-06-27 DIAGNOSIS — E11.9 TYPE 2 DIABETES MELLITUS WITHOUT COMPLICATION, WITHOUT LONG-TERM CURRENT USE OF INSULIN (HCC): ICD-10-CM

## 2025-06-27 DIAGNOSIS — D50.9 IRON DEFICIENCY ANEMIA, UNSPECIFIED IRON DEFICIENCY ANEMIA TYPE: ICD-10-CM

## 2025-06-27 DIAGNOSIS — M54.2 CHRONIC NECK PAIN: ICD-10-CM

## 2025-06-27 DIAGNOSIS — Z00.00 GENERAL MEDICAL EXAM: ICD-10-CM

## 2025-06-27 DIAGNOSIS — Z12.11 SPECIAL SCREENING FOR MALIGNANT NEOPLASMS, COLON: ICD-10-CM

## 2025-06-27 DIAGNOSIS — I10 PRIMARY HYPERTENSION: ICD-10-CM

## 2025-06-27 DIAGNOSIS — E78.49 OTHER HYPERLIPIDEMIA: ICD-10-CM

## 2025-06-27 PROCEDURE — 99212 OFFICE O/P EST SF 10 MIN: CPT | Performed by: INTERNAL MEDICINE

## 2025-06-27 RX ORDER — METFORMIN HYDROCHLORIDE 500 MG/1
1000 TABLET, EXTENDED RELEASE ORAL 2 TIMES DAILY
Qty: 360 TABLET | Refills: 5 | Status: SHIPPED | OUTPATIENT
Start: 2025-06-27

## 2025-06-27 RX ORDER — METHYLPREDNISOLONE 4 MG/1
TABLET ORAL
Qty: 1 KIT | Refills: 0 | Status: SHIPPED | OUTPATIENT
Start: 2025-06-27 | End: 2025-07-03

## 2025-06-27 SDOH — ECONOMIC STABILITY: FOOD INSECURITY: WITHIN THE PAST 12 MONTHS, YOU WORRIED THAT YOUR FOOD WOULD RUN OUT BEFORE YOU GOT MONEY TO BUY MORE.: NEVER TRUE

## 2025-06-27 SDOH — ECONOMIC STABILITY: FOOD INSECURITY: WITHIN THE PAST 12 MONTHS, THE FOOD YOU BOUGHT JUST DIDN'T LAST AND YOU DIDN'T HAVE MONEY TO GET MORE.: NEVER TRUE

## 2025-06-27 ASSESSMENT — ENCOUNTER SYMPTOMS
BACK PAIN: 0
COUGH: 0
SHORTNESS OF BREATH: 0
VOMITING: 0
BLOOD IN STOOL: 0
CONSTIPATION: 0
ABDOMINAL PAIN: 0
EYE PAIN: 0
DIARRHEA: 0
NAUSEA: 0

## 2025-06-27 ASSESSMENT — PATIENT HEALTH QUESTIONNAIRE - PHQ9
SUM OF ALL RESPONSES TO PHQ QUESTIONS 1-9: 0
1. LITTLE INTEREST OR PLEASURE IN DOING THINGS: NOT AT ALL
SUM OF ALL RESPONSES TO PHQ QUESTIONS 1-9: 0
2. FEELING DOWN, DEPRESSED OR HOPELESS: NOT AT ALL
SUM OF ALL RESPONSES TO PHQ QUESTIONS 1-9: 0
SUM OF ALL RESPONSES TO PHQ QUESTIONS 1-9: 0

## 2025-06-27 NOTE — PATIENT INSTRUCTIONS
feeling in the chest.     Sweating.     Shortness of breath.     Pain, pressure, or a strange feeling in the back, neck, jaw, or upper belly or in one or both shoulders or arms.     Lightheadedness or sudden weakness.     A fast or irregular heartbeat.   After you call 911, the  may tell you to chew 1 adult-strength or 2 to 4 low-dose aspirin. Wait for an ambulance. Do not try to drive yourself.  Watch closely for changes in your health, and be sure to contact your doctor if you have any problems.  Where can you learn more?  Go to https://www.[employer].net/patientEd and enter F075 to learn more about \"A Healthy Heart: Care Instructions.\"  Current as of: July 31, 2024  Content Version: 14.5  © 7990-1967 [employer].   Care instructions adapted under license by [employer]. If you have questions about a medical condition or this instruction, always ask your healthcare professional. [employer], disclaims any warranty or liability for your use of this information.    Personalized Preventive Plan for Steve Alejo - 6/27/2025  Medicare offers a range of preventive health benefits. Some of the tests and screenings are paid in full while other may be subject to a deductible, co-insurance, and/or copay.  Some of these benefits include a comprehensive review of your medical history including lifestyle, illnesses that may run in your family, and various assessments and screenings as appropriate.  After reviewing your medical record and screening and assessments performed today your provider may have ordered immunizations, labs, imaging, and/or referrals for you.  A list of these orders (if applicable) as well as your Preventive Care list are included within your After Visit Summary for your review.

## 2025-06-27 NOTE — PROGRESS NOTES
Medicare Annual Wellness Visit    Steve Alejo is here for Medicare AWV, Hypertension, Hyperlipidemia, and Diabetes    Assessment & Plan   Medicare annual wellness visit, subsequent  Type 2 diabetes mellitus without complication, without long-term current use of insulin (HCC)  -     metFORMIN (GLUCOPHAGE-XR) 500 MG extended release tablet; Take 2 tablets by mouth 2 times daily, Disp-360 tablet, R-5Normal  -     ESTABLISHED, MOD MDM, 30-39 MIN [74374]  Other hyperlipidemia  -     ESTABLISHED, MOD MDM, 30-39 MIN [93219]  Primary hypertension  -     ESTABLISHED, MOD MDM, 30-39 MIN [53901]  General medical exam  Iron deficiency anemia, unspecified iron deficiency anemia type  -     Iron and TIBC; Future  -     Hemoglobin; Future  -     ESTABLISHED, MOD MDM, 30-39 MIN [75178]  Special screening for malignant neoplasms, colon  -     [facility] [place] [facility], [place]  -     ESTABLISHED, MOD MDM, 30-39 MIN [58440]  Chronic neck pain  -     methylPREDNISolone (MEDROL DOSEPACK) 4 MG tablet; Take by mouth., Disp-1 kit, R-0Normal       Return in about 27 weeks (around 1/2/2026) for Hypertension, Hyperlipidemia, Diabetes.     Subjective       Patient's complete Health Risk Assessment and screening values have been reviewed and are found in Flowsheets. The following problems were reviewed today and where indicated follow up appointments were made and/or referrals ordered.    Positive Risk Factor Screenings with Interventions:                Abnormal BMI (obese):  Body mass index is 31.87 kg/m². (!) Abnormal  Interventions:  Patient declines any further evaluation or treatment        Dentist Screen:  Have you seen the dentist within the past year?: (!) No    Intervention:  Patient declines any further evaluation or treatment                      Objective   Vitals:    06/27/25 0902   BP: 128/72   BP Site: Left Upper Arm   Patient Position: Sitting   BP Cuff Size: Large Adult   Pulse: 64   Resp: 16   SpO2: 96%

## 2025-06-27 NOTE — PROGRESS NOTES
[facility]  [facility] A DEPARTMENT OF [facility]  [address] [facility] [address]  Dept: [phone]  Dept Fax: [phone]  Loc: [phone]     Setve lAejo is a 75 y.o. male who presents today for his medical conditions/complaintsas noted below.  Steve Alejo is c/o of   Chief Complaint   Patient presents with    Medicare AWV    Hypertension    Hyperlipidemia    Diabetes         HPI:     Hypertension  This is a chronic problem. The current episode started more than 1 year ago. The problem has been waxing and waning since onset. The problem is controlled. Pertinent negatives include no chest pain, headaches, neck pain, palpitations or shortness of breath.   Hyperlipidemia  This is a chronic problem. The current episode started more than 1 year ago. The problem is controlled. Recent lipid tests were reviewed and are variable. Pertinent negatives include no chest pain or shortness of breath.   Diabetes  He presents for his follow-up diabetic visit. He has type 2 diabetes mellitus. The initial diagnosis of diabetes was made 14 years ago. His disease course has been fluctuating. Pertinent negatives for hypoglycemia include no confusion, dizziness, headaches, nervousness/anxiousness or pallor. Pertinent negatives for diabetes include no chest pain, no polydipsia, no polyuria and no weakness.   Other  This is a recurrent (4-Medicare annual wellness visit, 5-General Medical exam) problem. The current episode started today. The problem occurs intermittently. The problem has been waxing and waning. Pertinent negatives include no abdominal pain, arthralgias, chest pain, chills, coughing, fever, headaches, nausea, neck pain, numbness, rash, vomiting or weakness.       Hemoglobin A1C (%)   Date Value   03/24/2025 7.1 (H)   12/12/2024 7.1 (H)   12/12/2024 7.1 (H)            No components found for: \"LABMICR\"  No components found for: \"LDLCHOLESTEROL\",

## 2025-07-14 ENCOUNTER — HOSPITAL ENCOUNTER (OUTPATIENT)
Age: 75
Discharge: HOME | End: 2025-07-14
Payer: MEDICARE

## 2025-07-14 DIAGNOSIS — E11.9 TYPE 2 DIABETES MELLITUS WITHOUT COMPLICATION, WITHOUT LONG-TERM CURRENT USE OF INSULIN (HCC): ICD-10-CM

## 2025-07-14 LAB
EST. AVERAGE GLUCOSE BLD GHB EST-MCNC: 163 MG/DL
HBA1C MFR BLD: 7.3 % (ref 4–6)

## 2025-07-14 PROCEDURE — 83036 HEMOGLOBIN GLYCOSYLATED A1C: CPT

## 2025-07-14 PROCEDURE — 36415 COLL VENOUS BLD VENIPUNCTURE: CPT

## 2025-07-17 ENCOUNTER — OFFICE VISIT (OUTPATIENT)
Dept: DIABETES SERVICES | Age: 75
End: 2025-07-17
Payer: MEDICARE

## 2025-07-17 ENCOUNTER — OFFICE VISIT (OUTPATIENT)
Dept: PODIATRY | Age: 75
End: 2025-07-17
Payer: MEDICARE

## 2025-07-17 VITALS
WEIGHT: 235.2 LBS | OXYGEN SATURATION: 98 % | DIASTOLIC BLOOD PRESSURE: 72 MMHG | HEIGHT: 72 IN | BODY MASS INDEX: 31.86 KG/M2 | HEART RATE: 67 BPM | SYSTOLIC BLOOD PRESSURE: 128 MMHG

## 2025-07-17 VITALS
SYSTOLIC BLOOD PRESSURE: 128 MMHG | HEIGHT: 72 IN | HEART RATE: 67 BPM | WEIGHT: 235 LBS | BODY MASS INDEX: 31.83 KG/M2 | DIASTOLIC BLOOD PRESSURE: 72 MMHG

## 2025-07-17 DIAGNOSIS — E11.42 DM TYPE 2 WITH DIABETIC PERIPHERAL NEUROPATHY (HCC): Primary | ICD-10-CM

## 2025-07-17 DIAGNOSIS — L84 CORNS AND CALLOSITIES: ICD-10-CM

## 2025-07-17 DIAGNOSIS — E78.49 OTHER HYPERLIPIDEMIA: ICD-10-CM

## 2025-07-17 DIAGNOSIS — B35.1 DERMATOPHYTOSIS OF NAIL: ICD-10-CM

## 2025-07-17 DIAGNOSIS — E11.9 TYPE 2 DIABETES MELLITUS WITHOUT COMPLICATION, WITHOUT LONG-TERM CURRENT USE OF INSULIN (HCC): Primary | ICD-10-CM

## 2025-07-17 DIAGNOSIS — I10 ESSENTIAL HYPERTENSION: ICD-10-CM

## 2025-07-17 PROCEDURE — 3078F DIAST BP <80 MM HG: CPT | Performed by: NURSE PRACTITIONER

## 2025-07-17 PROCEDURE — 11720 DEBRIDE NAIL 1-5: CPT | Performed by: PODIATRIST

## 2025-07-17 PROCEDURE — G8417 CALC BMI ABV UP PARAM F/U: HCPCS | Performed by: NURSE PRACTITIONER

## 2025-07-17 PROCEDURE — 1123F ACP DISCUSS/DSCN MKR DOCD: CPT | Performed by: NURSE PRACTITIONER

## 2025-07-17 PROCEDURE — 3074F SYST BP LT 130 MM HG: CPT | Performed by: NURSE PRACTITIONER

## 2025-07-17 PROCEDURE — 11056 PARNG/CUTG B9 HYPRKR LES 2-4: CPT | Performed by: PODIATRIST

## 2025-07-17 PROCEDURE — 2022F DILAT RTA XM EVC RTNOPTHY: CPT | Performed by: NURSE PRACTITIONER

## 2025-07-17 PROCEDURE — 99212 OFFICE O/P EST SF 10 MIN: CPT | Performed by: NURSE PRACTITIONER

## 2025-07-17 PROCEDURE — G2211 COMPLEX E/M VISIT ADD ON: HCPCS | Performed by: NURSE PRACTITIONER

## 2025-07-17 PROCEDURE — 1036F TOBACCO NON-USER: CPT | Performed by: NURSE PRACTITIONER

## 2025-07-17 PROCEDURE — 1159F MED LIST DOCD IN RCRD: CPT | Performed by: NURSE PRACTITIONER

## 2025-07-17 PROCEDURE — G8427 DOCREV CUR MEDS BY ELIG CLIN: HCPCS | Performed by: NURSE PRACTITIONER

## 2025-07-17 PROCEDURE — 3051F HG A1C>EQUAL 7.0%<8.0%: CPT | Performed by: NURSE PRACTITIONER

## 2025-07-17 PROCEDURE — 99214 OFFICE O/P EST MOD 30 MIN: CPT | Performed by: NURSE PRACTITIONER

## 2025-07-17 PROCEDURE — 3017F COLORECTAL CA SCREEN DOC REV: CPT | Performed by: NURSE PRACTITIONER

## 2025-07-17 ASSESSMENT — ENCOUNTER SYMPTOMS
SHORTNESS OF BREATH: 0
RESPIRATORY NEGATIVE: 1

## 2025-07-17 NOTE — PROGRESS NOTES
Foot Care Worksheet  PCP: Ryan Aleman MD  Last visit: 6/27/25    Nail description: Thick , Yellow , Crumbly , Marked limitation of ambulation     Pain resulting from thickened and dystrophy of nail plate No    Nails involved  Right   1,5  (T5-T9)  Left    1,5    (TA-T4)    Q7 1 Class A Finding - Non traumatic amputation of foot No    Q8 2 Class B Findings - Absent DP pulse No, Absent PT pulse No, Advanced tropic changes (3 required) Yes    Decrease hair growth Yes, Nail changes/thickening Yes, Pigmented changes/discoloration Yes, Skin texture (thin, shiny) No, Skin color (rubor/redness) No    Q9 1 Class B and 2 Class C Findings  Claudication No, Temperature change Yes, Paresthesia Yes, Burning No, Edema Yes    Subjective:  Patient presents to [facility] today for diabetic foot care.   Patient's diabetic control has been not changed.    No Known Allergies    Past Medical History:   Diagnosis Date    Anemia     minimal anemia, hemoglobin 13.6    Benign prostatic hypertrophy     Cyst in hand     distal phalanx, mid finger left hand    Hyperlipidemia     Hypertension     Keratosis     right forehead    Overweight(278.02)     Seborrhea     Type II or unspecified type diabetes mellitus without mention of complication, not stated as uncontrolled        Prior to Admission medications    Medication Sig Start Date End Date Taking? Authorizing Provider   metFORMIN (GLUCOPHAGE-XR) 500 MG extended release tablet Take 2 tablets by mouth 2 times daily 6/27/25  Yes Ryan Aleman MD   blood glucose monitor strips 50 strips by Other route daily Test 1 times a day DX: E11.65 5/20/25  Yes Keke Bright APRN - CNP   lisinopril (PRINIVIL;ZESTRIL) 10 MG tablet TAKE ONE TABLET BY MOUTH ONCE DAILY 5/6/25  Yes Keke Brgiht APRN - CNP   glimepiride (AMARYL) 2 MG tablet Take 1 tablet by mouth every morning (before breakfast) 3/18/25  Yes Keke Bright APRN - CNP   tamsulosin (FLOMAX) 0.4 MG capsule Take 1

## 2025-07-17 NOTE — PROGRESS NOTES
Patient Care Team:  Ryan Aleman MD as PCP - General (Internal Medicine)  [facility]  Keke Bright APRN - CNP as Nurse Practitioner (Nurse Practitioner)    Past DM Medications   Glimepiride-therapy completed  Starlix-therapy completed  Ozempic-cost     Current Diabetic Medications  Metformin 1000 mg bid  Glimepiride 2 mg daily      DKA episodes: 0    History of Present Illness  The patient presents for evaluation and management of diabetes mellitus.    The patient reports elevated fasting blood glucose levels in the mornings, typically ranging from 150 to 160 mg/dL, with a peak measurement of 194 mg/dL. They are uncertain about the etiology of this hyperglycemia, as they abstain from food intake prior to bedtime. Their current pharmacological regimen includes glimepiride 2 mg once daily and metformin 1000 mg twice daily. They perform self-monitoring of blood glucose once daily, predominantly in the morning.    The patient has expressed interest in initiating weekly injectable therapy but prefers to defer this until the beginning of the new year due to financial considerations. They have previously utilized [facility], which initially cost $548 per month, but the price has since decreased to $248 per month. Additionally, they have transitioned their pharmacy services back to [facility] due to issues with prescription fulfillment and customer service.    Dietary habits include consuming a bowl of cereal for breakfast, with a preference for eggs. The patient continues to maintain employment in two jobs and has no immediate plans for [employer]. They have reduced their alcohol intake to two bottles of beer per week.    Occupations: Employed in two jobs  Diet: Consumes a bowl of cereal for breakfast, prefers eggs  Alcohol: Consumes two bottles of beer per week      ICD-10-CM    1. Type 2 diabetes mellitus without complication, without long-term current use of

## (undated) DEVICE — 60 ML SYRINGE REGULAR TIP: Brand: MONOJECT

## (undated) DEVICE — 1200CC GUARDIAN II: Brand: GUARDIAN

## (undated) DEVICE — CANNULA NSL AD L2IN ETCO2 SAMP SFT CRUSH RESIST FEM AIRLFE

## (undated) DEVICE — MERCY DEFIANCE ENDO KIT: Brand: MEDLINE INDUSTRIES, INC.

## (undated) DEVICE — FORCEPS BX L240CM JAW DIA2.2MM RAD JAW 4 HOT DISP

## (undated) DEVICE — CONNECTOR TBNG AUX H2O JET DISP FOR OLY 160/180 SER

## (undated) DEVICE — FORCEPS BX L240CM JAW DIA2.4MM ORNG L CAP W/ NDL DISP RAD

## (undated) DEVICE — 9165 UNIVERSAL PATIENT PLATE: Brand: 3M™